# Patient Record
Sex: FEMALE | Race: WHITE | NOT HISPANIC OR LATINO | Employment: OTHER | URBAN - METROPOLITAN AREA
[De-identification: names, ages, dates, MRNs, and addresses within clinical notes are randomized per-mention and may not be internally consistent; named-entity substitution may affect disease eponyms.]

---

## 2017-01-03 ENCOUNTER — TELEPHONE (OUTPATIENT)
Dept: FAMILY MEDICINE | Facility: CLINIC | Age: 82
End: 2017-01-03

## 2017-01-06 ENCOUNTER — DOCUMENTATION ONLY (OUTPATIENT)
Dept: ORTHOPEDICS | Facility: CLINIC | Age: 82
End: 2017-01-06

## 2017-01-06 NOTE — PROGRESS NOTES
Called and Informed patient and spoke with her  of appointment on 01/10/17 at 2:30pm. Patient  states verbal understanding and has no further questions.

## 2017-01-09 ENCOUNTER — LAB VISIT (OUTPATIENT)
Dept: LAB | Facility: HOSPITAL | Age: 82
End: 2017-01-09
Attending: HOSPITALIST
Payer: MEDICARE

## 2017-01-09 ENCOUNTER — PATIENT OUTREACH (OUTPATIENT)
Dept: ADMINISTRATIVE | Facility: CLINIC | Age: 82
End: 2017-01-09
Payer: MEDICARE

## 2017-01-09 ENCOUNTER — TELEPHONE (OUTPATIENT)
Dept: HEPATOLOGY | Facility: HOSPITAL | Age: 82
End: 2017-01-09

## 2017-01-09 DIAGNOSIS — E83.42 HYPOMAGNESEMIA: Primary | ICD-10-CM

## 2017-01-09 LAB
ANION GAP SERPL CALC-SCNC: 11 MMOL/L
BUN SERPL-MCNC: 10 MG/DL
CALCIUM SERPL-MCNC: 9.1 MG/DL
CHLORIDE SERPL-SCNC: 97 MMOL/L
CO2 SERPL-SCNC: 28 MMOL/L
CREAT SERPL-MCNC: 0.7 MG/DL
EST. GFR  (AFRICAN AMERICAN): >60 ML/MIN/1.73 M^2
EST. GFR  (NON AFRICAN AMERICAN): >60 ML/MIN/1.73 M^2
GLUCOSE SERPL-MCNC: 179 MG/DL
MAGNESIUM SERPL-MCNC: 1.4 MG/DL
POTASSIUM SERPL-SCNC: 3.9 MMOL/L
SODIUM SERPL-SCNC: 136 MMOL/L

## 2017-01-09 PROCEDURE — 80048 BASIC METABOLIC PNL TOTAL CA: CPT

## 2017-01-09 PROCEDURE — 83735 ASSAY OF MAGNESIUM: CPT

## 2017-01-09 PROCEDURE — 36415 COLL VENOUS BLD VENIPUNCTURE: CPT

## 2017-01-09 NOTE — PATIENT INSTRUCTIONS
Mechanical Fall  You have had a fall today. It appears that the cause is what we call mechanical. That means that you slipped, tripped or lost your balance. If your fall had been due to fainting or a seizure, other tests might be required.  It is normal to feel sore and tight in your muscles and back the next day, and not just the muscles you injured at first. Remember, all the parts of your body are connected, so while initially one area hurts, the next day another may hurt. Also, when you injure yourself, it causes inflammation, which then causes the muscles to tighten up and hurt more. After the initial worsening, it should gradually improve over the next few days. However, report more severe pain.  Even without a definite head injury, you can still get a concussion from your head suddenly jerking forward, backward or sideways when falling. Concussions and even bleeding can still occur, especially if you have had a recent injury or take blood thinner medicine. It is not unusual to have a mild headache and feel tired and even nauseous or dizzy.   Home care  1. Rest today and resume your normal activities when you are feeling back to normal.  2. If you were injured during the fall, follow the advice from your doctor regarding care of your injury.  3. At first, do not try to stretch out the sore spots. If there is a strain, stretching may make it worse.  Massage may help relax the muscles without stretching them.  4. You can use an ice pack or cold compress on and off to the sore spots 10 to 20 at a time, as often as you feel comfortable. This may help reduce the inflammation, swelling and pain.  5. If you have any scrapes or abrasions, they usually heal within 10 days. It is important to keep the abrasions clean while they initially start to heal. However, an infection may occur even with proper care, so watch for early signs of infection.  Medications  · Talk to your doctor before taking new medicines,  especially if you have other medical problems or are taking other medicines.  · If you need anything for pain, you can take acetaminophen or ibuprofen, unless you were given a different pain medicine to use. Talk with your doctor before using these medicines if you have chronic liver or kidney disease, or ever had a stomach ulcer or gastrointestinal bleeding, or are taking blood thinner medicines.  · Be careful if you are given prescription pain medicines, narcotics, or medicine for muscle spasm. They can make you sleepy, dizzy and can affect your coordination, reflexes and judgment. Do not drive or do work where you can injure yourself when taking them.  Fall prevention  · Was there anything that caused your fall that can be fixed, removed, or replaced?  · Make your home safe by keeping walkways clear of objects you may trip over.  · Use non-slip pads under rugs. Don't use small area rugs or throw rugs.  · Do not walk in poorly lit areas.  · Do not stand on chairs or wobbly ladders.  · Use caution when reaching overhead or looking upward. This position can cause a loss of balance.  · Be sure your shoes fit properly, have non-slip bottoms and are in good condition.  · Be cautious when going up and down curbs, and walking on uneven sidewalks.  · If your balance is poor, consider using a cane or walker.  · Stay as active as you can. Balance, flexibility, strength, and endurance all come from exercise. They all play a role in preventing falls.  · If you have pets, know where they are before you stand up or walk so you don't trip over them.  · Limit alcohol intake. Alcohol can cause balance problems and increase the risk of falls.  · Use night lights.  Follow-up  Follow up with your doctor or as advised by our staff. If X-rays or CT scans were done, you will be notified if there is a change in the reading, especially if it affects treatment.  Call 911  Call 911 if any of these occur:  · Trouble breathing  · Confused or  difficulty arousing  · Fainting or loss of consciousness  · Rapid or very slow heart rate  · Seizure  · Difficulty with speech or vision, weakness of an arm or leg  · Difficulty walking or talking, loss of balance, numbness or weakness in one side of your body, facial droop  When to seek medical advice  Call your healthcare provider right away if any of these occur:  · Repeated mechanical falls, or unexplained falls  · Dizziness  · Severe headache  · Blood in vomit, stools (black or red color)  © 9129-2802 [employer]. [address] [place] [address], [place] [address]. All rights reserved. This information is not intended as a substitute for professional medical care. Always follow your healthcare professional's instructions.

## 2017-01-10 ENCOUNTER — OFFICE VISIT (OUTPATIENT)
Dept: ORTHOPEDICS | Facility: CLINIC | Age: 82
End: 2017-01-10
Payer: MEDICARE

## 2017-01-10 ENCOUNTER — HOSPITAL ENCOUNTER (OUTPATIENT)
Dept: RADIOLOGY | Facility: HOSPITAL | Age: 82
Discharge: HOME | End: 2017-01-10
Attending: ORTHOPAEDIC SURGERY
Payer: MEDICARE

## 2017-01-10 VITALS
RESPIRATION RATE: 16 BRPM | BODY MASS INDEX: 29.23 KG/M2 | DIASTOLIC BLOOD PRESSURE: 62 MMHG | HEART RATE: 97 BPM | WEIGHT: 126.31 LBS | SYSTOLIC BLOOD PRESSURE: 121 MMHG | HEIGHT: 55 IN

## 2017-01-10 DIAGNOSIS — S32.810D MULTIPLE CLOSED FRACTURES OF PELVIS WITH STABLE DISRUPTION OF PELVIC CIRCLE WITH ROUTINE HEALING, SUBSEQUENT ENCOUNTER: Primary | ICD-10-CM

## 2017-01-10 DIAGNOSIS — S32.810D MULTIPLE CLOSED FRACTURES OF PELVIS WITH STABLE DISRUPTION OF PELVIC CIRCLE WITH ROUTINE HEALING, SUBSEQUENT ENCOUNTER: ICD-10-CM

## 2017-01-10 PROCEDURE — 1125F AMNT PAIN NOTED PAIN PRSNT: CPT | Mod: S$GLB,,, | Performed by: PHYSICIAN ASSISTANT

## 2017-01-10 PROCEDURE — 72190 X-RAY EXAM OF PELVIS: CPT | Mod: 26,,, | Performed by: RADIOLOGY

## 2017-01-10 PROCEDURE — 1157F ADVNC CARE PLAN IN RCRD: CPT | Mod: S$GLB,,, | Performed by: PHYSICIAN ASSISTANT

## 2017-01-10 PROCEDURE — 3078F DIAST BP <80 MM HG: CPT | Mod: S$GLB,,, | Performed by: PHYSICIAN ASSISTANT

## 2017-01-10 PROCEDURE — 99499 UNLISTED E&M SERVICE: CPT | Mod: S$GLB,,, | Performed by: PHYSICIAN ASSISTANT

## 2017-01-10 PROCEDURE — 3074F SYST BP LT 130 MM HG: CPT | Mod: S$GLB,,, | Performed by: PHYSICIAN ASSISTANT

## 2017-01-10 PROCEDURE — 72190 X-RAY EXAM OF PELVIS: CPT | Mod: TC

## 2017-01-10 PROCEDURE — 99024 POSTOP FOLLOW-UP VISIT: CPT | Mod: S$GLB,,, | Performed by: PHYSICIAN ASSISTANT

## 2017-01-10 PROCEDURE — 1159F MED LIST DOCD IN RCRD: CPT | Mod: S$GLB,,, | Performed by: PHYSICIAN ASSISTANT

## 2017-01-10 PROCEDURE — 1160F RVW MEDS BY RX/DR IN RCRD: CPT | Mod: S$GLB,,, | Performed by: PHYSICIAN ASSISTANT

## 2017-01-10 PROCEDURE — 99999 PR PBB SHADOW E&M-EST. PATIENT-LVL III: CPT | Mod: PBBFAC,,, | Performed by: PHYSICIAN ASSISTANT

## 2017-01-10 NOTE — LETTER
January 11, 2017      Neptali Zhang MD  [address] [facility] [address]           [facility]  [address]  [facility] [address]  Phone: [phone]          Patient: Marina Trimble   MR Number: [id number]   YOB: 1935   Date of Visit: 1/10/2017       Dear Dr. Neptali Zhang:    Thank you for referring Marina Trimble to me for evaluation. Attached you will find relevant portions of my assessment and plan of care.    If you have questions, please do not hesitate to call me. I look forward to following Marina Trimble along with you.    Sincerely,    MALIA Coelho  CC:  No Recipients    If you would like to receive this communication electronically, please contact [email or web address] or [phone] to request more information on [employer] access.    For providers and/or their staff who would like to refer a patient to [facility], please contact us through our one-stop-shop provider referral line, [facility] , at [phone].    If you feel you have received this communication in error or would no longer like to receive these types of communications, please e-mail [email or web address]

## 2017-01-11 NOTE — PROGRESS NOTES
"Subjective:      Patient ID: Marina Trimble is a 81 y.o. female.    Chief Complaint: Pain and Injury of the Pelvis    HPI    Patient is a 81 year old female who presents to clinic for follow up of left superior anferior pubic rami fracture that occurred on 12/23/2016 after falling from a standing height. Patient has been treated non-operatively. She is weight bearing as tolerated with assistance of a walker She has previous left sided weakness due to previous stroke. Patient uses walker at baseline. She is at home. She is receiving home health. She reports minimal pain. Patient stated that intermittent Naproxen is the only thing that decreases her pain. Denied numbness or tingling.     Review of Systems   Constitution: Negative for chills and fever.   Cardiovascular: Negative for chest pain.   Respiratory: Negative for cough and shortness of breath.    Skin: Negative for color change, dry skin, itching, nail changes, poor wound healing and rash.   Musculoskeletal:        Pelvic fracture.    Neurological: Negative for dizziness.   Psychiatric/Behavioral: Negative for altered mental status. The patient is not nervous/anxious.    All other systems reviewed and are negative.        Objective:        General    Constitutional: She is oriented to person, place, and time. She appears well-developed and well-nourished. No distress.   HENT:   Head: Atraumatic.   Eyes: Conjunctivae are normal.   Cardiovascular: Normal rate.    Pulmonary/Chest: Effort normal.   Neurological: She is alert and oriented to person, place, and time.   Psychiatric: She has a normal mood and affect. Her behavior is normal.         Left Hip Exam     Comments:  Presents to clinic in wheelchair with the .   Mild pain with internal and external rotation of the hip passively.             RADS" pelvis: done in clinic: reviewed by myself:   Healing left inferior pubic ramus fracture in good alignment.  The anterior left acetabular fracture of " 12/23/16 is barely perceptible on the radiograph      Assessment:       Encounter Diagnosis   Name Primary?    Multiple closed fractures of pelvis with stable disruption of pelvic [place] with routine healing, subsequent encounter Yes          Plan:       Discussed treatment plan with patient and family.   She is to be treated non-operatively.   weight bearing as tolerated with assistance.   Continue PT/OT, no pain medication refill needed.   return to clinic in 4 weeks at time x-ray of her pelvis AP inlet outlet and judet is needed.

## 2017-01-11 NOTE — TELEPHONE ENCOUNTER
"Called Ms. Trimble and discussed concerns of medications. Educated patient on need to take MagOX because of her Magnesium was low. She voiced understanding but she wasn't going to take it because he stools would be loose. Assured her that it was temporary while taking medication which was only for three days. She still verbalized she wasn't going to take it.  She explained that her home dose of multivitamin was 2 tabs and that is how she was going to take that medications. I told her resume home dose and follow-up PCP.  Patient stated that she is currently on "Wellpatch" at home and wont be taking the Lidoderm at home. I told her that was okay and any further questions or concerns to follow-up with her primary.  Lastly patient informed me that she was taking 35 units of her Levemir. Explained that is too much and the dose was decreased at discharge to 33 units. She then informed me that her blood sugars were 65 and 72 the last 2 morning. I then instructed her to reduce the levermir to 27units. Also if she has another low blood sugar to inform her PCP and home health nurse. She verbalized understanding  "

## 2017-01-16 ENCOUNTER — TELEPHONE (OUTPATIENT)
Dept: FAMILY MEDICINE | Facility: CLINIC | Age: 82
End: 2017-01-16

## 2017-01-16 ENCOUNTER — TELEPHONE (OUTPATIENT)
Dept: ORTHOPEDICS | Facility: CLINIC | Age: 82
End: 2017-01-16

## 2017-01-16 NOTE — TELEPHONE ENCOUNTER
----- Message from Lisa Chand sent at 1/16/2017 10:39 AM CST -----  Contact: [facility]  Pt's home health nurse states pt is experiencing pain in left pelvic area. Pt doesn't take pain medication. Please call [phone]. Thanks

## 2017-01-16 NOTE — TELEPHONE ENCOUNTER
----- Message from Emely Will sent at 1/16/2017  1:26 PM CST -----  Contact: farza( [facility]) [phone]  Pt is having increased pain to her left pelvis and she declined pain medication.

## 2017-01-16 NOTE — TELEPHONE ENCOUNTER
Elio BERMUDEZ stated, patient has increase pain in her pelvic area. She is taking pain medication. Patient did not fall. Will continue to monitor. No further assistance is needed at this time.

## 2017-01-23 ENCOUNTER — LAB VISIT (OUTPATIENT)
Dept: LAB | Facility: HOSPITAL | Age: 82
End: 2017-01-23
Attending: INTERNAL MEDICINE
Payer: MEDICARE

## 2017-01-23 ENCOUNTER — TELEPHONE (OUTPATIENT)
Dept: FAMILY MEDICINE | Facility: CLINIC | Age: 82
End: 2017-01-23

## 2017-01-23 ENCOUNTER — OFFICE VISIT (OUTPATIENT)
Dept: FAMILY MEDICINE | Facility: CLINIC | Age: 82
End: 2017-01-23
Payer: MEDICARE

## 2017-01-23 VITALS
HEIGHT: 58 IN | DIASTOLIC BLOOD PRESSURE: 78 MMHG | BODY MASS INDEX: 25.82 KG/M2 | WEIGHT: 123 LBS | OXYGEN SATURATION: 97 % | SYSTOLIC BLOOD PRESSURE: 122 MMHG | TEMPERATURE: 99 F | HEART RATE: 80 BPM

## 2017-01-23 DIAGNOSIS — M46.1 SACROILIITIS: ICD-10-CM

## 2017-01-23 DIAGNOSIS — R41.82 ALTERED MENTAL STATUS, UNSPECIFIED ALTERED MENTAL STATUS TYPE: ICD-10-CM

## 2017-01-23 DIAGNOSIS — I70.0 ATHEROSCLEROSIS OF AORTA: ICD-10-CM

## 2017-01-23 DIAGNOSIS — Z86.73 HISTORY OF CVA (CEREBROVASCULAR ACCIDENT): ICD-10-CM

## 2017-01-23 DIAGNOSIS — M47.816 LUMBAR ARTHROPATHY: ICD-10-CM

## 2017-01-23 DIAGNOSIS — C85.96 LYMPHOMA OF LYMPH NODES IN PELVIS, UNSPECIFIED LYMPHOMA TYPE: ICD-10-CM

## 2017-01-23 DIAGNOSIS — I10 ESSENTIAL HYPERTENSION: ICD-10-CM

## 2017-01-23 DIAGNOSIS — E83.42 HYPOMAGNESEMIA: ICD-10-CM

## 2017-01-23 DIAGNOSIS — E79.0 HYPERURICEMIA: ICD-10-CM

## 2017-01-23 DIAGNOSIS — E11.42 DIABETIC POLYNEUROPATHY ASSOCIATED WITH TYPE 2 DIABETES MELLITUS: ICD-10-CM

## 2017-01-23 DIAGNOSIS — M79.642 PAIN IN BOTH HANDS: Primary | ICD-10-CM

## 2017-01-23 DIAGNOSIS — M79.641 PAIN IN BOTH HANDS: Primary | ICD-10-CM

## 2017-01-23 DIAGNOSIS — I69.354 HEMIPARESIS AFFECTING LEFT SIDE AS LATE EFFECT OF STROKE: ICD-10-CM

## 2017-01-23 DIAGNOSIS — N39.0 URINARY TRACT INFECTION WITHOUT HEMATURIA, SITE UNSPECIFIED: ICD-10-CM

## 2017-01-23 DIAGNOSIS — R53.81 DEBILITY: ICD-10-CM

## 2017-01-23 DIAGNOSIS — S32.810D MULTIPLE CLOSED FRACTURES OF PELVIS WITH STABLE DISRUPTION OF PELVIC CIRCLE WITH ROUTINE HEALING, SUBSEQUENT ENCOUNTER: ICD-10-CM

## 2017-01-23 LAB
ALBUMIN SERPL BCP-MCNC: 2.8 G/DL
ALP SERPL-CCNC: 112 U/L
ALT SERPL W/O P-5'-P-CCNC: 14 U/L
ANION GAP SERPL CALC-SCNC: 9 MMOL/L
AST SERPL-CCNC: 20 U/L
BASOPHILS # BLD AUTO: 0.01 K/UL
BASOPHILS NFR BLD: 0.1 %
BILIRUB SERPL-MCNC: 0.5 MG/DL
BUN SERPL-MCNC: 11 MG/DL
CALCIUM SERPL-MCNC: 9.6 MG/DL
CHLORIDE SERPL-SCNC: 99 MMOL/L
CO2 SERPL-SCNC: 29 MMOL/L
CREAT SERPL-MCNC: 0.7 MG/DL
DIFFERENTIAL METHOD: ABNORMAL
EOSINOPHIL # BLD AUTO: 0.1 K/UL
EOSINOPHIL NFR BLD: 1.1 %
ERYTHROCYTE [DISTWIDTH] IN BLOOD BY AUTOMATED COUNT: 15.1 %
EST. GFR  (AFRICAN AMERICAN): >60 ML/MIN/1.73 M^2
EST. GFR  (NON AFRICAN AMERICAN): >60 ML/MIN/1.73 M^2
GLUCOSE SERPL-MCNC: 147 MG/DL
HCT VFR BLD AUTO: 34.6 %
HGB BLD-MCNC: 10.8 G/DL
LYMPHOCYTES # BLD AUTO: 1.5 K/UL
LYMPHOCYTES NFR BLD: 17.6 %
MAGNESIUM SERPL-MCNC: 1.3 MG/DL
MCH RBC QN AUTO: 25.7 PG
MCHC RBC AUTO-ENTMCNC: 31.2 %
MCV RBC AUTO: 82 FL
MONOCYTES # BLD AUTO: 0.5 K/UL
MONOCYTES NFR BLD: 5.7 %
NEUTROPHILS # BLD AUTO: 6.2 K/UL
NEUTROPHILS NFR BLD: 75.1 %
PLATELET # BLD AUTO: 383 K/UL
PMV BLD AUTO: 9.6 FL
POTASSIUM SERPL-SCNC: 4.4 MMOL/L
PROT SERPL-MCNC: 7.3 G/DL
RBC # BLD AUTO: 4.2 M/UL
SODIUM SERPL-SCNC: 137 MMOL/L
URATE SERPL-MCNC: 2.7 MG/DL
WBC # BLD AUTO: 8.29 K/UL

## 2017-01-23 PROCEDURE — 85025 COMPLETE CBC W/AUTO DIFF WBC: CPT

## 2017-01-23 PROCEDURE — 3074F SYST BP LT 130 MM HG: CPT | Mod: S$GLB,,, | Performed by: INTERNAL MEDICINE

## 2017-01-23 PROCEDURE — 1160F RVW MEDS BY RX/DR IN RCRD: CPT | Mod: S$GLB,,, | Performed by: INTERNAL MEDICINE

## 2017-01-23 PROCEDURE — 83735 ASSAY OF MAGNESIUM: CPT

## 2017-01-23 PROCEDURE — 1125F AMNT PAIN NOTED PAIN PRSNT: CPT | Mod: S$GLB,,, | Performed by: INTERNAL MEDICINE

## 2017-01-23 PROCEDURE — 84550 ASSAY OF BLOOD/URIC ACID: CPT

## 2017-01-23 PROCEDURE — 99999 PR PBB SHADOW E&M-EST. PATIENT-LVL III: CPT | Mod: PBBFAC,,, | Performed by: INTERNAL MEDICINE

## 2017-01-23 PROCEDURE — 1157F ADVNC CARE PLAN IN RCRD: CPT | Mod: S$GLB,,, | Performed by: INTERNAL MEDICINE

## 2017-01-23 PROCEDURE — 99499 UNLISTED E&M SERVICE: CPT | Mod: S$GLB,,, | Performed by: INTERNAL MEDICINE

## 2017-01-23 PROCEDURE — 80053 COMPREHEN METABOLIC PANEL: CPT

## 2017-01-23 PROCEDURE — 3078F DIAST BP <80 MM HG: CPT | Mod: S$GLB,,, | Performed by: INTERNAL MEDICINE

## 2017-01-23 PROCEDURE — 99215 OFFICE O/P EST HI 40 MIN: CPT | Mod: S$GLB,,, | Performed by: INTERNAL MEDICINE

## 2017-01-23 PROCEDURE — 36415 COLL VENOUS BLD VENIPUNCTURE: CPT | Mod: PO

## 2017-01-23 PROCEDURE — 1159F MED LIST DOCD IN RCRD: CPT | Mod: S$GLB,,, | Performed by: INTERNAL MEDICINE

## 2017-01-23 RX ORDER — NITROFURANTOIN 25; 75 MG/1; MG/1
100 CAPSULE ORAL 2 TIMES DAILY
Qty: 10 CAPSULE | Refills: 0 | Status: SHIPPED | OUTPATIENT
Start: 2017-01-23 | End: 2017-01-28

## 2017-01-23 NOTE — TELEPHONE ENCOUNTER
----- Message from Amy Santacruz sent at 1/23/2017  9:04 AM CST -----  Contact: Elio [phone]  Home Health Therapist is calling to notify doctor she has been doing great with HH until Friday. He is unsure if she has a case of gout, terrible hand pain, help to get up and down. Before Friday she was almost independent. Thank you!

## 2017-01-23 NOTE — TELEPHONE ENCOUNTER
Home Health Therapist is calling to notify doctor she has been doing great with HH until Friday. He is unsure if she has a case of gout, terrible hand pain, help to get up and down. Before Friday she was almost independent. Appointment 1/23/17.

## 2017-01-23 NOTE — PROGRESS NOTES
Chief complaint: Follow-up from the hospital    81-year-old white female with multiple medical problems .    Here with her .  Recently hospital for a pelvic fracture.  Looks like she had a bad UTI with culture positive.  Looks like she received Cipro on December 28.  She was discharged on January 6 and refused to go get any of the prescriptions sent which appears to have been some Cipro, Naprosyn.  In the hospital she did have some acute increase in her hand pain superimposed on her arthritis.  She does have a history of gout.  It looks like she received a cortisone injection.  The hand pain improved.  This past weekend 2 days ago or has became swollen again and she had some altered mental status, somewhat combative, decreased mobility and decreased purchase patient in her ADLs such as pulling up her pants but she does status because her hands her.  She was getting this way somewhat combative in the hospital when she had the UTI.  She was declining getting the Cipro filled because she felt it gave her diarrhea but it looks like they probably were giving her somatic easier for a low magnesium level upon discharge.  She has had no further discharge or GI symptoms or any other sign of infection.  She has no obvious UTI symptoms but did not have them prior.  Her sugars are running in the 200s during the day but that usual.  She did increase her Levemir to 35 units.  I reviewed prior labs and she did have some high uric acid levels back in 2011.  Hospital records reviewed and  and patient counseled at great length today.Total time over 45 minutes with over 50% counseling.      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 Shot monthly.  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  13) iron deficiency anemia. 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  Vertebral compression fracture treated with vertebral plasty   19.  Prevnar 13 2016 20.  Pelvic fracture December 2016    Allergies: Prednisone, hydrocodone, Lotensin, oxybutynin     Social history: The patient is a nonsmoker who is retired. The patient is . She rarely drank alcohol.     Family history: There is a significant family history of cardiac, pulmonary, and endocrine disease. There is no family history of head and neck malignancy.      Marina was seen today for hospital follow up, fall, hand swelling and transitional care.    Diagnoses and all orders for this visit:    Pain in both hands, suspect an acute crystal pathology such as gout or pseudogout superimposed on her DJD.  Perhaps an ongoing infection such as a recurrent UTIs a precipitating factor.  Also suspect some worsening secondary to lack of feeling the Naprosyn.  We will use the over-the-counter Aleve twice a day for now    Hyperuricemia, reassess discussed treating the acute inflammation and then possibly starting allopurinol if indeed this is related to hyperuricemia  -     Uric acid; Future  -     Magnesium; Future    Altered mental status, unspecified altered mental  status type, may well be secondary to UTI    Urinary tract infection without hematuria, site unspecified, prior culture reviewed, it's impossible for her to give us a urine today secondary to her debilitated status.  Discussed that if indeed mental status and so forth were since she may need to go to the emergency room again    Essential hypertension, chronic and stable  -     CBC auto differential; Future  -     Comprehensive metabolic panel; Future    Hypomagnesemia  -     Magnesium; Future    Debility    Diabetes mellitus with neurological manifestations, uncontrolled, discussed a titration of her Levemir again    Diabetic polyneuropathy associated with type 2 diabetes mellitus    Hemiparesis affecting left side as late effect of stroke, affecting ADLs    Uncontrolled type 2 diabetes mellitus without complication, with long-term current use of insulin    Lumbar arthropathy, affecting ADLs    Multiple closed fractures of pelvis with stable disruption of pelvic [place] with routine healing, subsequent encounter,.  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       Hospital records reviewed as below:      HPI: Patient is a 81 y.o. female who has a past medical history of Anemia; Aneurysm of aorta; Back pain; Cataract; Diabetes mellitus, type 2; Erosive (osteo)arthritis; Gout; Hemiparesis affecting left side as late effect of stroke; History of compression fracture of vertebral column; Hyperlipidemia; Hypertension; Inflammatory bowel disease; Lymphoma of lymph nodes in pelvis; Melanoma; MGUS; Senile osteoporosis;  Urinary incontinence; Vitamin B 12 deficiency; and Vitamin K deficiency presented with left hip pain after sustaining a mechanical fall at beauty shop. She was admitted to [facility] and was found to have multiple pubic rami fractures on imaging. She normally uses a walker to assist with ambulation since her stroke 4 years ago. She was evaluated by orthopedics but they advised non-surgical management. She was seen by PTOT and pain was managed. Patient did not tolerate opioids for pain control due to side effects mainly agitation. Pain control was mainly achieved with NSAIDS and tylenol. Rheumatology was also consulted for arthritis and she was given steroids and voltaren gel. Her hospitalization was also significant for a UTI for which she was treated with ceftriaxone for E. Coli. Patient has been working with PT/OT who recommend SNF for further balance/mobility training. She is currently complaining of fatigue after working with PT. She is tolerating oral intake without nausea. He last bowel movement was yesterday. Pain is about 7/10 and she hadn't had her pain medication since earlier. Patient lives at home with her  and requires DME for ambulating. Patient currently denies any fever/chills, chest pain, dyspnea, weakness, numbness, abdominal pain, nausea/vomiting, dysuria/hematuria, or weight loss.        Hospital Course:  * Multiple fractures of pelvis with stable disruption of pelvic [place] with routine healing  -conservative medical management per orthopedics  -pain control with acetaminophen 650mg q6hrs prn , lidocaine patch 5% q24hrs, and naproxen 500mg bid prn, Patient discharged home on lidocaine patch 5% q24hrs and to finish naproxen 500mg bid 5 day course.  -participated in PT/OT, therapy feels that patient is safe to return home with home health services  -WBAT per ortho  -Fall precautions.  -orthopedics follow up scheduled for 1/12/17  -DVT prophylaxis with enoxaparin 40mg daily      Hypertension  -Low  Na diet  -continue amlodipine 5mg daily and losartan 50mg daily, discharge home on amlodipine 5mg daily and losartan 50mg daily      Hyperlipidemia  -treated with atorvastatin 40mg daily, discharge home on atorvastatin 40mg daily      Anemia  -stable for now at baseline in the 10s  -continue to monitor in the setting of DVT prophylaxis and antiplatelet use  -patient receives B12 injections Q3 weeks; last one given on 1/4/17       UTI (urinary tract infection)  -due to E.coli  -complete course of cipro 500mg daily   -no leukocytosis and afebrile at this time      Diabetes mellitus type 2, uncontrolled  -blood glucoses under acceptable control  -increased novolog 4 units tid with meals to novolog 6 units tid meals, patient to resume home dose of 4/6/6 units with meals  -adjusted detemir to 30 units nightly while at [facility], AM labs with increase to glucose, Patient discharged home on 33 units nightly  -accu checks AC&HS  -hyper/hypoglycemia protocol  -adjust as needed  -on diabetic diet       Hemiparesis affecting left side as late effect of stroke  -treated with atorvastatin 40mg daily, ASA 81mg daily, plavix 75mg daily; Patient discharged home on atorvastatin 40mg daily, ASA 81mg daily, plavix 75mg daily  -continue PT/OT      Erosive (osteo)arthritis  -seen by rheumatology and completed a course of steroids   -xrays of right hand c/o decrease use of hand and pain, no change from previous xays, treated with naproxen 500mg bid  -treated with voltaren gel to hands, calcium carbonate 1000mg daily, and vitamin D 1000units daily; Patient discharged on voltaren gel to hands, calcium carbonate 1000mg daily, and vitamin D 1000units daily  -pain control naproxen 500mg bid x5 days, patient to complete course at home, Orthopedic appointment on 1/12/17      Debility  -PT/OT at [facility] and home health at discharge  -bowel regimen with senna-docusate 8.6-50mg bid and polyethylene 17g bid; patient discharged home on polyethylene  17g prn constipation  -treated with pantoprazole 40mg daily, patient discharge home on pantoprazole 40mg daily  -fall precautions      Hypomagnesemia  -treated with MagOx, Patient to take 3 day course of MagOx 400mg bid at home with home health to repeat labs on Monday and send results to PCP

## 2017-01-24 ENCOUNTER — TELEPHONE (OUTPATIENT)
Dept: FAMILY MEDICINE | Facility: CLINIC | Age: 82
End: 2017-01-24

## 2017-01-24 NOTE — TELEPHONE ENCOUNTER
Call pt AND  -    the recent labs look ok except the magnesium is still a little low but should not really be causing any symptoms. The hospital DID send magnesium pills to take for 5 days so they should be at the pharmacy. prob good to take them and watch for POSSIBLE diarrhea but usually not too bad with the pills    The URIC ACID gout level was actually very normal which is good    All else was ok

## 2017-01-25 ENCOUNTER — TELEPHONE (OUTPATIENT)
Dept: FAMILY MEDICINE | Facility: CLINIC | Age: 82
End: 2017-01-25

## 2017-01-25 NOTE — TELEPHONE ENCOUNTER
----- Message from Michaelle Ruff sent at 1/24/2017 12:57 PM CST -----  Contact: self  Pt is requesting a call from nurse. [phone]          Thanks

## 2017-02-07 ENCOUNTER — OFFICE VISIT (OUTPATIENT)
Dept: ORTHOPEDICS | Facility: CLINIC | Age: 82
End: 2017-02-07
Payer: MEDICARE

## 2017-02-07 ENCOUNTER — HOSPITAL ENCOUNTER (OUTPATIENT)
Dept: RADIOLOGY | Facility: HOSPITAL | Age: 82
Discharge: HOME | End: 2017-02-07
Attending: ORTHOPAEDIC SURGERY
Payer: MEDICARE

## 2017-02-07 VITALS
BODY MASS INDEX: 25.82 KG/M2 | DIASTOLIC BLOOD PRESSURE: 81 MMHG | SYSTOLIC BLOOD PRESSURE: 133 MMHG | RESPIRATION RATE: 16 BRPM | WEIGHT: 123 LBS | HEART RATE: 99 BPM | HEIGHT: 58 IN

## 2017-02-07 DIAGNOSIS — S32.810D MULTIPLE CLOSED FRACTURES OF PELVIS WITH STABLE DISRUPTION OF PELVIC CIRCLE WITH ROUTINE HEALING, SUBSEQUENT ENCOUNTER: ICD-10-CM

## 2017-02-07 DIAGNOSIS — Z98.890 STATUS POST SURGERY: ICD-10-CM

## 2017-02-07 DIAGNOSIS — T14.8XXA FRACTURE: Primary | ICD-10-CM

## 2017-02-07 PROCEDURE — 99999 PR PBB SHADOW E&M-EST. PATIENT-LVL III: CPT | Mod: PBBFAC,,, | Performed by: PHYSICIAN ASSISTANT

## 2017-02-07 PROCEDURE — 1157F ADVNC CARE PLAN IN RCRD: CPT | Mod: S$GLB,,, | Performed by: PHYSICIAN ASSISTANT

## 2017-02-07 PROCEDURE — 72190 X-RAY EXAM OF PELVIS: CPT | Mod: 26,,, | Performed by: RADIOLOGY

## 2017-02-07 PROCEDURE — 3079F DIAST BP 80-89 MM HG: CPT | Mod: S$GLB,,, | Performed by: PHYSICIAN ASSISTANT

## 2017-02-07 PROCEDURE — 1159F MED LIST DOCD IN RCRD: CPT | Mod: S$GLB,,, | Performed by: PHYSICIAN ASSISTANT

## 2017-02-07 PROCEDURE — 1126F AMNT PAIN NOTED NONE PRSNT: CPT | Mod: S$GLB,,, | Performed by: PHYSICIAN ASSISTANT

## 2017-02-07 PROCEDURE — 99213 OFFICE O/P EST LOW 20 MIN: CPT | Mod: 24,S$GLB,, | Performed by: PHYSICIAN ASSISTANT

## 2017-02-07 PROCEDURE — 72190 X-RAY EXAM OF PELVIS: CPT | Mod: TC

## 2017-02-07 PROCEDURE — 1160F RVW MEDS BY RX/DR IN RCRD: CPT | Mod: S$GLB,,, | Performed by: PHYSICIAN ASSISTANT

## 2017-02-07 PROCEDURE — 3075F SYST BP GE 130 - 139MM HG: CPT | Mod: S$GLB,,, | Performed by: PHYSICIAN ASSISTANT

## 2017-02-08 NOTE — PROGRESS NOTES
"Subjective:      Patient ID: Marina Trimble is a 81 y.o. female.    Chief Complaint: Follow-up (pelvis)    HPI    Patient is a 81 year old female who presents to clinic for follow up of left superior anferior pubic rami fracture that occurred on 12/23/2016 after falling from a standing height. Patient has been treated non-operatively. She is weight bearing as tolerated with assistance of a walker. She has previous left sided weakness due to previous stroke. Patient uses walker at baseline. She is at home. She was receiving home health, discharged for meeting all goals. She reports not pain. She stated that she is at baseline for how she was prior to injury. Denied numbness or tingling.     Review of Systems   Constitution: Negative for chills and fever.   Cardiovascular: Negative for chest pain.   Respiratory: Negative for cough and shortness of breath.    Skin: Negative for color change, dry skin, itching, nail changes, poor wound healing and rash.   Musculoskeletal:        Pelvic fracture.    Neurological: Negative for dizziness.   Psychiatric/Behavioral: Negative for altered mental status. The patient is not nervous/anxious.    All other systems reviewed and are negative.        Objective:        General    Constitutional: She is oriented to person, place, and time. She appears well-developed and well-nourished. No distress.   HENT:   Head: Atraumatic.   Eyes: Conjunctivae are normal.   Cardiovascular: Normal rate.    Pulmonary/Chest: Effort normal.   Neurological: She is alert and oriented to person, place, and time.   Psychiatric: She has a normal mood and affect. Her behavior is normal.         Left Hip Exam     Comments:  Presents to clinic in wheelchair with the .   no pain with internal and external rotation of the hip passively.  full range of motion of hip knee and ankle.              RADS" pelvis: done in clinic: reviewed by myself:   Healing left inferior pubic ramus fracture in good " alignment.     Assessment:       Encounter Diagnoses   Name Primary?    Fracture Yes    Multiple closed fractures of pelvis with stable disruption of pelvic [place] with routine healing, subsequent encounter           Plan:       Discussed treatment plan with patient and family.   She is to be treated non-operatively.   weight bearing as tolerated    Continue PT/OT exercises as taught to her,   no pain medication refill needed.   return to clinic in 6 weeks if any increase in pain at time x-ray of her pelvis AP inlet outlet and judet is needed.

## 2017-02-09 ENCOUNTER — OFFICE VISIT (OUTPATIENT)
Dept: PODIATRY | Facility: CLINIC | Age: 82
End: 2017-02-09
Payer: MEDICARE

## 2017-02-09 VITALS
SYSTOLIC BLOOD PRESSURE: 128 MMHG | WEIGHT: 123 LBS | DIASTOLIC BLOOD PRESSURE: 60 MMHG | HEIGHT: 58 IN | BODY MASS INDEX: 25.82 KG/M2

## 2017-02-09 DIAGNOSIS — L90.9 FAT PAD ATROPHY OF FOOT: ICD-10-CM

## 2017-02-09 DIAGNOSIS — D51.0 PERNICIOUS ANEMIA: ICD-10-CM

## 2017-02-09 DIAGNOSIS — B35.1 NAIL DERMATOPHYTOSIS: ICD-10-CM

## 2017-02-09 DIAGNOSIS — E11.49 TYPE II DIABETES MELLITUS WITH NEUROLOGICAL MANIFESTATIONS: Primary | ICD-10-CM

## 2017-02-09 PROCEDURE — 99999 PR PBB SHADOW E&M-EST. PATIENT-LVL III: CPT | Mod: PBBFAC,,, | Performed by: PODIATRIST

## 2017-02-09 PROCEDURE — 11721 DEBRIDE NAIL 6 OR MORE: CPT | Mod: Q9,S$GLB,, | Performed by: PODIATRIST

## 2017-02-09 PROCEDURE — 99499 UNLISTED E&M SERVICE: CPT | Mod: S$GLB,,, | Performed by: PODIATRIST

## 2017-02-09 RX ORDER — DICLOFENAC SODIUM 10 MG/G
GEL TOPICAL
COMMUNITY
Start: 2017-01-24 | End: 2019-01-02

## 2017-02-09 NOTE — PATIENT INSTRUCTIONS
Recommend lotions: eucerin, aquaphor, A&D ointment, gold bond for diabetics, sween    Shoe recommendations: (try 6pm.com, [email or web address].com , [email or web address], or shoes.com for discounted prices) you can visit DSW shoes in [place] as well    Asics (GT 1000 or gel foundations), new balance, saucony (stabil c3),  Barnes (transcend), vionic, propet (tennis shoe)    soft brand, clarks, crocs, aerosoles, naturalizers, SAS, ecco, ginette, sara geiger, rockports (dress shoes)    Vionic, volitiles, burkenstocks, fitflops, propet (sandals)    Nike comfort thong sandals, crocs (house shoes)    Nail Home remedy:  Vicks Vapor rub OR Listerine and apple cider vinegar in a spray bottle to nails    Occasional soaks for 15-20 mins in luke warm water with 1 cup of listerine and 1 cup of apple cider vinegar are ok You may add several drops of oil of oregano or tea tree oil as well      Diabetes: Inspecting Your Feet  Diabetes increases your chances of developing foot problems. So inspect your feet every day. This helps you find small skin irritations before they become serious infections. If you have trouble seeing the bottoms of your feet, use a mirror or ask a family member or friend to help.     Pressure spots on the bottom of the foot are common areas where problems develop.   How to check your feet  Below are tips to help you look for foot problems. Try to check your feet at the same time each day, such as when you get out of bed in the morning:  · Check the top of each foot. The tops of toes, back of the heel, and outer edge of the foot can get a lot of rubbing from poor-fitting shoes.  · Check the bottom of each foot. Daily wear and tear often leads to problems at pressure spots.  · Check the toes and nails. Fungal infections often occur between toes. Toenail problems can also be a sign of fungal infections or lead to breaks in the skin.  · Check your shoes, too. Loose objects inside a shoe can injure the foot. Use your hand to feel  inside your shoes for things like mariano, loose stitching, or rough areas that could irritate your skin.  Warning signs  Look for any color changes in the foot. Redness with streaks can signal a severe infection, which needs immediate medical attention. Tell your doctor right away if you have any of these problems:  · Swelling, sometimes with color changes, may be a sign of poor blood flow or infection. Symptoms include tenderness and an increase in the size of your foot.  · Warm or hot areas on your feet may be signs of infection. A foot that is cold may not be getting enough blood.  · Sensations such as burning, tingling, or pins and needles can be signs of a problem. Also check for areas that may be numb.  · Hot spots are caused by friction or pressure. Look for hot spots in areas that get a lot of rubbing. Hot spots can turn into blisters, calluses, or sores.  · Cracks and sores are caused by dry or irritated skin. They are a sign that the skin is breaking down, which can lead to infection.  · Toenail problems to watch for include nails growing into the skin (ingrown toenail) and causing redness or pain. Thick, yellow, or discolored nails can signal a fungal infection.  · Drainage and odor can develop from untreated sores and ulcers. Call your doctor right away if you notice white or yellow drainage, bleeding, or unpleasant odor.   © 0412-6335 [employer]. [address] [place] [address], [place] [address]. All rights reserved. This information is not intended as a substitute for professional medical care. Always follow your healthcare professional's instructions.

## 2017-02-09 NOTE — PROGRESS NOTES
Subjective:      Patient ID: Marina Trimble is a 81 y.o. female.    Chief Complaint: Diabetic Foot Exam (Pcp  Dr. Jimenez  01/23/2017); Diabetes Mellitus; and Nail Care    Marina is a 81 y.o. female who presents to the clinic for evaluation and treatment of high risk feet. 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 The patient's chief complaint is long, thick toenails. This patient has documented high risk feet requiring routine maintenance secondary to diabetes mellitis and those secondary complications of diabetes, as mentioned. She has the one type of anemia; pernious 281.0 that still qualifies for nail care.    PCP: Jose Jimenez MD    Date Last Seen by PCP:   Chief Complaint   Patient presents with    Diabetic Foot Exam     Pcp  Dr. Jimenez  01/23/2017    Diabetes Mellitus    Nail Care       Current shoe gear:  SAS shoes    Hemoglobin A1C   Date Value Ref Range Status   12/23/2016 9.2 (H) 4.5 - 6.2 % Final     Comment:     According to ADA guidelines, hemoglobin A1C <7.0% represents  optimal control in non-pregnant diabetic patients.  Different  metrics may apply to specific populations.   Standards of Medical Care in Diabetes - 2016.  For the purpose  "of screening for the presence of diabetes:  <5.7%     Consistent with the absence of diabetes  5.7-6.4%  Consistent with increasing risk for diabetes   (prediabetes)  >or=6.5%  Consistent with diabetes  Currently no consensus exists for use of hemoglobin A1C  for diagnosis of diabetes for children.     08/25/2016 9.2 (H) 4.5 - 6.2 % Final     Comment:     According to ADA guidelines, hemoglobin A1C <7.0% represents  optimal control in non-pregnant diabetic patients.  Different  metrics may apply to specific populations.   Standards of Medical Care in Diabetes - 2016.  For the purpose of screening for the presence of diabetes:  <5.7%     Consistent with the absence of diabetes  5.7-6.4%  Consistent with increasing risk for diabetes   (prediabetes)  >or=6.5%  Consistent with diabetes  Currently no consensus exists for use of hemoglobin A1C  for diagnosis of diabetes for children.     03/07/2016 8.4 (H) 4.5 - 6.2 % Final     Current Outpatient Prescriptions on File Prior to Visit   Medication Sig Dispense Refill    acetaminophen (TYLENOL) 500 MG tablet Take 1 tablet (500 mg total) by mouth every 4 (four) hours as needed for Pain.  0    amitriptyline (ELAVIL) 10 MG tablet Take 10 mg by mouth once daily.       amlodipine (NORVASC) 5 MG tablet Take 1 tablet (5 mg total) by mouth once daily. 90 tablet 4    aspirin (ECOTRIN) 81 MG EC tablet Take 81 mg by mouth once daily.      atorvastatin (LIPITOR) 40 MG tablet TAKE 1 TABLET ONE TIME DAILY 90 tablet 0    BD ULTRA-FINE BEVERLY PEN NEEDLES 32 gauge x 5/32" Ndle USE TWICE DAILY 500 each 12    blood sugar diagnostic (BLOOD GLUCOSE TEST) Strp Use to check blood glucose 3 times daily. Device is Accucheck. 100 each 11    blood-glucose meter Misc Use ad directed (Accu Check Beverly smartview meter) 1 each 4    calcium carbonate (TUMS) 200 mg calcium (500 mg) chewable tablet Take 2 tablets (1,000 mg total) by mouth once daily. 60 tablet 11    clopidogrel (PLAVIX) 75 mg tablet " "TAKE 1 TABLET ONE TIME DAILY 90 tablet 3    cyanocobalamin 1,000 mcg/mL injection INJECT 1 ML UNDER THE SKIN EVERY 2 WEEKS (Patient taking differently: INJECT 1 ML UNDER THE SKIN EVERY 3 WEEKS) 6 mL 10    cycloSPORINE (RESTASIS) 0.05 % ophthalmic emulsion Place 0.4 mLs (1 drop total) into both eyes 2 (two) times daily. 180 vial 12    insulin aspart (NOVOLOG FLEXPEN) 100 unit/mL InPn pen Inject 4/6/6 units into skin with correction scale 150-200 +1, 201-250 +2, 251-300 +3, 301-350 +4, >350 +5. 6 mL 4    insulin detemir (LEVEMIR FLEXTOUCH) 100 unit/mL (3 mL) SubQ InPn pen Inject 33 Units into the skin every evening. 9 mL 4    lancets (ACCU-CHEK SOFTCLIX LANCETS) Misc Use to check blood glucose 3 times daily 100 each 11    lidocaine (LIDODERM) 5 % Place 1 patch onto the skin once daily. Remove & Discard patch within 12 hours or as directed by MD 30 patch 0    losartan (COZAAR) 50 MG tablet TAKE 1 TABLET ONE TIME DAILY 90 tablet 12    magnesium oxide (MAG-OX) 400 mg tablet Take 1 tablet (400 mg total) by mouth 2 (two) times daily. 5 tablet 0    multivitamin (CHEWABLE MULTI VITAMIN) Chew Take 2 tablets by mouth once daily.       naproxen (NAPROSYN) 500 MG tablet Take 1 tablet (500 mg total) by mouth 2 (two) times daily with meals. 11 tablet 0    NOVOFINE 32 32 gauge x 1/4" Ndle USE WITH LEVEMIR PEN EVERY EVENING 100 each 3    pantoprazole (PROTONIX) 40 MG tablet Take 1 tablet (40 mg total) by mouth once daily. 30 tablet 3    polyethylene glycol (GLYCOLAX) 17 gram PwPk Take 17 g by mouth daily as needed (constipation). 30 each 0    PROCTOZONE-HC 2.5 % rectal cream Place rectally 2 (two) times daily. 90 g 12    TRUEPLUS LANCETS 28 gauge Misc USE  TO CHECK BLOOD SUGAR THREE TIMES DAILY 300 each 0    UNABLE TO FIND Places uses Wellpatch daily      vitamin D 1000 units Tab Take 1 tablet (1,000 Units total) by mouth once daily.      triamcinolone acetonide 0.1% (KENALOG) 0.1 % cream Apply topically 2 (two) " times daily. 45 g 3     No current facility-administered medications on file prior to visit.      Review of patient's allergies indicates:   Allergen Reactions    Baclofen Nausea And Vomiting and Other (See Comments)     Dizziness    Benazepril      Other reaction(s): Rash,Swelling    Carisoprodol      Other reaction(s): Dysphoria    Desloratadine      Other reaction(s): Nausea  Other reaction(s): Nausea    Hydrocodone      Other reaction(s): disorientation    Methocarbamol      Other reaction(s): Nausea    Omeprazole Nausea And Vomiting    Oxybutynin      Other reaction(s): Hallucinations    Oxycodone Nausea And Vomiting    Tramadol      Other reaction(s): dizziness    Benazepril-hydrochlorothiazide Rash     Other reaction(s): Swelling    Metformin Diarrhea    Prednisone Rash     Past Surgical History   Procedure Laterality Date    Partial glossectomy  2010    Selective neck dissection  2010    Hysterectomy      Tonsilectomy      Colectomy      Tonsillectomy      Appendectomy      Cholecystectomy      Eyelid surgery      Cataract extraction w/  intraocular lens implant Left 10/16/14     OS ()     Family History   Problem Relation Age of Onset    Coronary artery disease      COPD      Skin cancer Neg Hx     Melanoma Neg Hx      Social History     Social History    Marital status:      Spouse name: N/A    Number of children: N/A    Years of education: N/A     Occupational History    Not on file.     Social History Main Topics    Smoking status: Never Smoker    Smokeless tobacco: Never Used    Alcohol use No    Drug use: No    Sexual activity: No     Other Topics Concern    Are You Pregnant Or Think You May Be? No    Breast-Feeding No     Social History Narrative    She is . She lives on the [facility].     Review of Systems   Constitution: Negative for chills, fever and weakness.   Cardiovascular: Positive for leg swelling. Negative for claudication.  "  Respiratory: Positive for shortness of breath. Negative for cough.    Skin: Positive for dry skin and nail changes. Negative for itching and rash.   Musculoskeletal: Positive for arthritis, back pain, falls (december 2016, broke her hip), gout and joint pain. Negative for joint swelling and muscle weakness.   Gastrointestinal: Negative for diarrhea, nausea and vomiting.   Neurological: Positive for paresthesias. Negative for numbness and tremors.   Psychiatric/Behavioral: Negative for altered mental status and hallucinations.         Objective:       Vitals:    02/09/17 0926   BP: 128/60   Weight: 55.8 kg (123 lb)   Height: 4' 10" (1.473 m)   PainSc: 0-No pain       Physical Exam   Constitutional:   General: Pt. is well-developed, well-nourished, appears stated age, in no acute distress, alert and oriented x 3. No evidence of depression, anxiety, or agitation. Calm, cooperative, and communicative. Appropriate interactions and affect.       Cardiovascular:   Pulses:       Dorsalis pedis pulses are 1+ on the right side, and 1+ on the left side.        Posterior tibial pulses are 1+ on the right side, and 1+ on the left side.   Dorsalis pedis and posterior tibial pulses are diminished Bilaterally. Toes are cool to touch. Feet are warm proximally.There is decreased digital hair. Skin is atrophic   Musculoskeletal:        Right ankle: She exhibits swelling. She exhibits normal range of motion. No tenderness. Achilles tendon exhibits no pain and no defect.        Left ankle: She exhibits swelling. She exhibits normal range of motion. No tenderness. Achilles tendon exhibits no pain and no defect.        Right foot: There is normal range of motion and no tenderness.        Left foot: There is normal range of motion and no tenderness.   Decreased stride, station of gait.  apropulsive toe off.  Increased angle and base of gait.    Patient has hammertoes of digits 2-5 bilateral partially reducible without symptom " today.    Visible and palpable bunion without pain at dorsomedial 1st metatarsal head right and left.  Hallux abducted right and left partially reducible, tracks laterally without being track bound.  No ecchymosis, erythema, edema, or cardinal signs infection or signs of trauma same foot.    Fat pad atrophy to heels and met heads bilateral       Neurological: No sensory deficit.   Paden-Rodolfo 5.07 monofilament is intact bilateral feet. Sharp/dull sensation is also intact Bilateral feet.       Skin: Skin is warm, dry and intact. No abrasion, no ecchymosis, no lesion and no rash noted. She is not diaphoretic. No cyanosis or erythema. No pallor. Nails show no clubbing.   Skin is atrophic, mild plantar rubor.  No pallor.      No pedal hair noted    Toenails 1-5 bilaterally are elongated by 2-3 mm, thickened by 2-3 mm, discolored/yellowed, dystrophic, brittle with subungual debris.     granuloma noted to lateral right hallux nail border;  Tender to palpation     Interdigital Spaces clean, dry and without evidence of break in skin integrity   Psychiatric: She has a normal mood and affect. Her speech is normal.   Nursing note and vitals reviewed.        Assessment:       Encounter Diagnoses   Name Primary?    Type II diabetes mellitus with neurological manifestations Yes    Pernicious anemia     Fat pad atrophy of foot     Nail dermatophytosis          Plan:       Marina was seen today for diabetic foot exam, diabetes mellitus and nail care.    Diagnoses and all orders for this visit:    Type II diabetes mellitus with neurological manifestations    Pernicious anemia    Fat pad atrophy of foot    Nail dermatophytosis    I counseled the patient on her conditions, their implications and medical management.    - Shoe inspection. Diabetic Foot Education. Patient reminded of the importance of good nutrition and blood sugar control to help prevent podiatric complications of diabetes. Patient instructed on proper foot  hygeine. We discussed wearing proper shoe gear, daily foot inspections, never walking without protective shoe gear, never putting sharp instruments to feet.     - With patient's permission, nails were aggressively reduced and debrided x 10 to their soft tissue attachment mechanically and with electric , removing all offending nail and debris. Patient relates relief following the procedure.  She will continue to monitor the areas daily, inspect her feet, wear protective shoe gear when ambulatory, moisturizer to maintain skin integrity and follow in this office in approximately 3-4 months, sooner p.r.n.

## 2017-02-09 NOTE — MR AVS SNAPSHOT
[facility]  [address] [place] [address]  Phone: [phone]                  Marina Trimble   2017 9:30 AM   Office Visit    Description:  Female : 1935   Provider:  Debbie Woodruff DPM   Department:  [facility]           Reason for Visit     Diabetic Foot Exam     Diabetes Mellitus     Nail Care           Diagnoses this Visit        Comments    Type II diabetes mellitus with neurological manifestations    -  Primary     Pernicious anemia         Fat pad atrophy of foot         Nail dermatophytosis                To Do List           Future Appointments        Provider Department Dept Phone    3/1/2017 9:30 AM LAB, [facility] [phone]    3/2/2017 1:00 PM MD [facility] A [facility] - Hematology Oncology [phone]      Goals (5 Years of Data)     None      [facility] On Call     [facility] On [facility] Nurse Care Line -  Assistance  Registered nurses in the [facility] provide clinical advisement, health education, appointment booking, and other advisory services.  Call for this free service at [phone].             Medications           Message regarding Medications     Verify the changes and/or additions to your medication regime listed below are the same as discussed with your clinician today.  If any of these changes or additions are incorrect, please notify your healthcare provider.             Verify that the below list of medications is an accurate representation of the medications you are currently taking.  If none reported, the list may be blank. If incorrect, please contact your healthcare provider. Carry this list with you in case of emergency.           Current Medications     acetaminophen (TYLENOL) 500 MG tablet Take 1 tablet (500 mg total) by mouth every 4 (four) hours as needed for Pain.    amitriptyline (ELAVIL) 10 MG tablet Take 10 mg by mouth once daily.     amlodipine (NORVASC) 5 MG tablet Take 1  "tablet (5 mg total) by mouth once daily.    aspirin (ECOTRIN) 81 MG EC tablet Take 81 mg by mouth once daily.    atorvastatin (LIPITOR) 40 MG tablet TAKE 1 TABLET ONE TIME DAILY    BD ULTRA-FINE BEVERLY PEN NEEDLES 32 gauge x 5/32" Ndle USE TWICE DAILY    blood sugar diagnostic (BLOOD GLUCOSE TEST) Strp Use to check blood glucose 3 times daily. Device is Accucheck.    blood-glucose meter Misc Use ad directed (Accu Check Beverly smartview meter)    calcium carbonate (TUMS) 200 mg calcium (500 mg) chewable tablet Take 2 tablets (1,000 mg total) by mouth once daily.    clopidogrel (PLAVIX) 75 mg tablet TAKE 1 TABLET ONE TIME DAILY    cyanocobalamin 1,000 mcg/mL injection INJECT 1 ML UNDER THE SKIN EVERY 2 WEEKS    cycloSPORINE (RESTASIS) 0.05 % ophthalmic emulsion Place 0.4 mLs (1 drop total) into both eyes 2 (two) times daily.    insulin aspart (NOVOLOG FLEXPEN) 100 unit/mL InPn pen Inject 4/6/6 units into skin with correction scale 150-200 +1, 201-250 +2, 251-300 +3, 301-350 +4, >350 +5.    insulin detemir (LEVEMIR FLEXTOUCH) 100 unit/mL (3 mL) SubQ InPn pen Inject 33 Units into the skin every evening.    lancets (ACCU-CHEK SOFTCLIX LANCETS) Misc Use to check blood glucose 3 times daily    lidocaine (LIDODERM) 5 % Place 1 patch onto the skin once daily. Remove & Discard patch within 12 hours or as directed by MD    losartan (COZAAR) 50 MG tablet TAKE 1 TABLET ONE TIME DAILY    magnesium oxide (MAG-OX) 400 mg tablet Take 1 tablet (400 mg total) by mouth 2 (two) times daily.    multivitamin (CHEWABLE MULTI VITAMIN) Chew Take 2 tablets by mouth once daily.     naproxen (NAPROSYN) 500 MG tablet Take 1 tablet (500 mg total) by mouth 2 (two) times daily with meals.    NOVOFINE 32 32 gauge x 1/4" Ndle USE WITH LEVEMIR PEN EVERY EVENING    pantoprazole (PROTONIX) 40 MG tablet Take 1 tablet (40 mg total) by mouth once daily.    polyethylene glycol (GLYCOLAX) 17 gram PwPk Take 17 g by mouth daily as needed (constipation).    " "PROCTOZONE-HC 2.5 % rectal cream Place rectally 2 (two) times daily.    TRUEPLUS LANCETS 28 gauge Misc USE  TO CHECK BLOOD SUGAR THREE TIMES DAILY    UNABLE TO FIND Places uses Wellpatch daily    vitamin D 1000 units Tab Take 1 tablet (1,000 Units total) by mouth once daily.    VOLTAREN 1 % Gel     triamcinolone acetonide 0.1% (KENALOG) 0.1 % cream Apply topically 2 (two) times daily.           Clinical Reference Information           Your Vitals Were     BP Height Weight BMI       128/60 4' 10" (1.473 m) 55.8 kg (123 lb) 25.71 kg/m2       Blood Pressure          Most Recent Value    BP  128/60      Allergies as of 2/9/2017     Baclofen    Benazepril    Carisoprodol    Desloratadine    Hydrocodone    Methocarbamol    Omeprazole    Oxybutynin    Oxycodone    Tramadol    Benazepril-hydrochlorothiazide    Metformin    Prednisone      Immunizations Administered on Date of Encounter - 2/9/2017     None      Instructions    Recommend lotions: eucerin, aquaphor, A&D ointment, gold bond for diabetics, sween    Shoe recommendations: (try [email or web address], [email or web address] , [email or web address], or [email or web address] Labs for discounted prices) you can visit DSW shoes in [place] as well    Asics (GT 1000 or gel foundations), new balance, saucony (stabil c3),  Barnes (transcend), vionic, propet (tennis shoe)    soft brand, clarks, crocs, aerosoles, naturalizers, SAS, ecco, ginette, sara geiger, rockports (dress shoes)    Vionic, volitiles, burkenstocks, fitflops, propet (sandals)    Nike comfort thong sandals, crocs (house shoes)    Nail Home remedy:  Vicks Vapor rub OR Listerine and apple cider vinegar in a spray bottle to nails    Occasional soaks for 15-20 mins in luke warm water with 1 cup of listerine and 1 cup of apple cider vinegar are ok You may add several drops of oil of oregano or tea tree oil as well      Diabetes: Inspecting Your Feet  Diabetes increases your chances of developing foot problems. So inspect your feet every day. This helps you find " small skin irritations before they become serious infections. If you have trouble seeing the bottoms of your feet, use a mirror or ask a family member or friend to help.     Pressure spots on the bottom of the foot are common areas where problems develop.   How to check your feet  Below are tips to help you look for foot problems. Try to check your feet at the same time each day, such as when you get out of bed in the morning:  · Check the top of each foot. The tops of toes, back of the heel, and outer edge of the foot can get a lot of rubbing from poor-fitting shoes.  · Check the bottom of each foot. Daily wear and tear often leads to problems at pressure spots.  · Check the toes and nails. Fungal infections often occur between toes. Toenail problems can also be a sign of fungal infections or lead to breaks in the skin.  · Check your shoes, too. Loose objects inside a shoe can injure the foot. Use your hand to feel inside your shoes for things like mariano, loose stitching, or rough areas that could irritate your skin.  Warning signs  Look for any color changes in the foot. Redness with streaks can signal a severe infection, which needs immediate medical attention. Tell your doctor right away if you have any of these problems:  · Swelling, sometimes with color changes, may be a sign of poor blood flow or infection. Symptoms include tenderness and an increase in the size of your foot.  · Warm or hot areas on your feet may be signs of infection. A foot that is cold may not be getting enough blood.  · Sensations such as burning, tingling, or pins and needles can be signs of a problem. Also check for areas that may be numb.  · Hot spots are caused by friction or pressure. Look for hot spots in areas that get a lot of rubbing. Hot spots can turn into blisters, calluses, or sores.  · Cracks and sores are caused by dry or irritated skin. They are a sign that the skin is breaking down, which can lead to  infection.  · Toenail problems to watch for include nails growing into the skin (ingrown toenail) and causing redness or pain. Thick, yellow, or discolored nails can signal a fungal infection.  · Drainage and odor can develop from untreated sores and ulcers. Call your doctor right away if you notice white or yellow drainage, bleeding, or unpleasant odor.   © 9942-5636 [employer]. [address] [place] [address], [place] [address]. All rights reserved. This information is not intended as a substitute for professional medical care. Always follow your healthcare professional's instructions.               Language Assistance Services     ATTENTION: Language assistance services are available, free of charge. Please call [phone].      ATENCIÓN: Si camillala alvino, tiene a vasquez disposición servicios gratuitos de asistencia lingüística. Llame al [phone].     CHÚ Ý: N?u b?n nói Ti?ng Vi?t, có các d?ch v? h? tr? ngôn ng? mi?n phí dành cho b?n. G?i s? [phone].         [facility] complies with applicable Federal civil rights laws and does not discriminate on the basis of race, color, national origin, age, disability, or sex.

## 2017-03-01 ENCOUNTER — LAB VISIT (OUTPATIENT)
Dept: LAB | Facility: HOSPITAL | Age: 82
End: 2017-03-01
Attending: INTERNAL MEDICINE
Payer: MEDICARE

## 2017-03-01 DIAGNOSIS — E56.1 VITAMIN K DEFICIENCY: ICD-10-CM

## 2017-03-01 DIAGNOSIS — D47.2 MGUS (MONOCLONAL GAMMOPATHY OF UNKNOWN SIGNIFICANCE): ICD-10-CM

## 2017-03-01 DIAGNOSIS — E53.8 VITAMIN B12 DEFICIENCY: ICD-10-CM

## 2017-03-01 DIAGNOSIS — D64.9 ANEMIA: ICD-10-CM

## 2017-03-01 LAB
ALBUMIN SERPL BCP-MCNC: 3.3 G/DL
ALP SERPL-CCNC: 89 U/L
ALT SERPL W/O P-5'-P-CCNC: 10 U/L
ANION GAP SERPL CALC-SCNC: 10 MMOL/L
APTT BLDCRRT: 22.7 SEC
AST SERPL-CCNC: 19 U/L
BILIRUB SERPL-MCNC: 0.3 MG/DL
BUN SERPL-MCNC: 10 MG/DL
CALCIUM SERPL-MCNC: 9.4 MG/DL
CHLORIDE SERPL-SCNC: 104 MMOL/L
CO2 SERPL-SCNC: 29 MMOL/L
CREAT SERPL-MCNC: 0.7 MG/DL
ERYTHROCYTE [DISTWIDTH] IN BLOOD BY AUTOMATED COUNT: 16.6 %
EST. GFR  (AFRICAN AMERICAN): >60 ML/MIN/1.73 M^2
EST. GFR  (NON AFRICAN AMERICAN): >60 ML/MIN/1.73 M^2
GLUCOSE SERPL-MCNC: 90 MG/DL
HCT VFR BLD AUTO: 36.6 %
HGB BLD-MCNC: 11 G/DL
MCH RBC QN AUTO: 24.8 PG
MCHC RBC AUTO-ENTMCNC: 30.1 %
MCV RBC AUTO: 82 FL
NEUTROPHILS # BLD AUTO: 4.1 K/UL
PLATELET # BLD AUTO: 363 K/UL
PMV BLD AUTO: 9.9 FL
POTASSIUM SERPL-SCNC: 3.8 MMOL/L
PROT SERPL-MCNC: 7.3 G/DL
RBC # BLD AUTO: 4.44 M/UL
SODIUM SERPL-SCNC: 143 MMOL/L
VIT B12 SERPL-MCNC: 1795 PG/ML
WBC # BLD AUTO: 6.66 K/UL

## 2017-03-01 PROCEDURE — 85027 COMPLETE CBC AUTOMATED: CPT | Mod: PO

## 2017-03-01 PROCEDURE — 84165 PROTEIN E-PHORESIS SERUM: CPT

## 2017-03-01 PROCEDURE — 84165 PROTEIN E-PHORESIS SERUM: CPT | Mod: 26,,, | Performed by: PATHOLOGY

## 2017-03-01 PROCEDURE — 85730 THROMBOPLASTIN TIME PARTIAL: CPT

## 2017-03-01 PROCEDURE — 36415 COLL VENOUS BLD VENIPUNCTURE: CPT | Mod: PO

## 2017-03-01 PROCEDURE — 82607 VITAMIN B-12: CPT

## 2017-03-01 PROCEDURE — 80053 COMPREHEN METABOLIC PANEL: CPT

## 2017-03-02 ENCOUNTER — OFFICE VISIT (OUTPATIENT)
Dept: HEMATOLOGY/ONCOLOGY | Facility: CLINIC | Age: 82
End: 2017-03-02
Payer: MEDICARE

## 2017-03-02 VITALS
DIASTOLIC BLOOD PRESSURE: 78 MMHG | HEIGHT: 60 IN | SYSTOLIC BLOOD PRESSURE: 118 MMHG | HEART RATE: 84 BPM | BODY MASS INDEX: 25.67 KG/M2 | WEIGHT: 130.75 LBS

## 2017-03-02 DIAGNOSIS — E53.8 VITAMIN B12 DEFICIENCY: ICD-10-CM

## 2017-03-02 DIAGNOSIS — D47.2 MGUS (MONOCLONAL GAMMOPATHY OF UNKNOWN SIGNIFICANCE): ICD-10-CM

## 2017-03-02 DIAGNOSIS — D64.9 ANEMIA, UNSPECIFIED TYPE: ICD-10-CM

## 2017-03-02 DIAGNOSIS — C82.96 FOLLICULAR LYMPHOMA OF INTRAPELVIC LYMPH NODES, UNSPECIFIED FOLLICULAR LYMPHOMA TYPE: Primary | ICD-10-CM

## 2017-03-02 LAB
ALBUMIN SERPL ELPH-MCNC: 3.35 G/DL
ALPHA1 GLOB SERPL ELPH-MCNC: 0.44 G/DL
ALPHA2 GLOB SERPL ELPH-MCNC: 1.23 G/DL
B-GLOBULIN SERPL ELPH-MCNC: 0.84 G/DL
GAMMA GLOB SERPL ELPH-MCNC: 0.95 G/DL
PATHOLOGIST INTERPRETATION SPE: NORMAL
PROT SERPL-MCNC: 6.8 G/DL

## 2017-03-02 PROCEDURE — 1159F MED LIST DOCD IN RCRD: CPT | Mod: S$GLB,,, | Performed by: INTERNAL MEDICINE

## 2017-03-02 PROCEDURE — 99999 PR PBB SHADOW E&M-EST. PATIENT-LVL III: CPT | Mod: PBBFAC,,, | Performed by: INTERNAL MEDICINE

## 2017-03-02 PROCEDURE — 1126F AMNT PAIN NOTED NONE PRSNT: CPT | Mod: S$GLB,,, | Performed by: INTERNAL MEDICINE

## 2017-03-02 PROCEDURE — 99214 OFFICE O/P EST MOD 30 MIN: CPT | Mod: S$GLB,,, | Performed by: INTERNAL MEDICINE

## 2017-03-02 PROCEDURE — 1157F ADVNC CARE PLAN IN RCRD: CPT | Mod: S$GLB,,, | Performed by: INTERNAL MEDICINE

## 2017-03-02 PROCEDURE — 3074F SYST BP LT 130 MM HG: CPT | Mod: S$GLB,,, | Performed by: INTERNAL MEDICINE

## 2017-03-02 PROCEDURE — 3078F DIAST BP <80 MM HG: CPT | Mod: S$GLB,,, | Performed by: INTERNAL MEDICINE

## 2017-03-02 PROCEDURE — 1160F RVW MEDS BY RX/DR IN RCRD: CPT | Mod: S$GLB,,, | Performed by: INTERNAL MEDICINE

## 2017-03-02 PROCEDURE — 99499 UNLISTED E&M SERVICE: CPT | Mod: S$GLB,,, | Performed by: INTERNAL MEDICINE

## 2017-03-02 NOTE — PROGRESS NOTES
"Mrs. Andrade is an 81-year-old woman who was treated for lymphoma in 1997.    Biopsy of a retroperitoneal lymph node was interpreted as "malignant lymphoma,   follicular center cell type, small cleaved cell predominant, diffuse sclerotic   growth pattern".  She received six courses of CHOP chemotherapy, followed by   radiation to the periaortic lymph nodes in the region of L1-L5,   completed in March 1998.    Since that time, she has had no symptoms or signs of lymphoma recurrence.  She   is not having fevers or sweats.  She has not noted any enlarged lymph nodes.    In 2004, she had iron deficiency anemia.  A video capsule study showed a small   intestinal lesion that was probably an AVM.  She required several infusions of   intravenous iron, the last of which was administered in November 2009.  There   has been no recurrence of significant anemia or bleeding since then.    In July 2007, a benign colonic stricture was surgically removed.  Wound healing   was very slow.  In August 2007, her B12 level was low and she began vitamin B12   at a dose of 1 mg IM every three weeks and continues to take this.  She also   had a prolongation of the partial thromboplastin time.  No explanation for this   was found.  She eventually was given an empiric trial of vitamin K and the PTT   returned to normal and has remained normal.  She has not taken vitamin K for   several years.    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    In 2009, she had a faint IgG lambda paraprotein found in serum protein   electrophoresis.  In recent years, that has not been detectable.    She continues to have back pain, which is troubled her for many years.  She also   has diabetes mellitus, diastolic dysfunction and some left-sided weakness from   a prior CVA.  In late December 2016, she fell and fractured multiple bones in "   her pelvis.  She had a long hospitalization during which she developed   polyarthritis along with confusion and agitation.  Eventually, she improved to a   point that she was able to return home.  However, she can do little at home   other than dress herself and bathe herself.  She moves very slowly and she has a   great fear of falling again.    ADDITIONAL PAST HISTORY, SYSTEM REVIEW, SOCIAL HISTORY AND FAMILY HISTORY:  Have   been reviewed and updated in the electronic record.    PHYSICAL EXAMINATION:  GENERAL APPEARANCE:  A well-developed female who requires some assistance to   climb onto an examination table.  EYES:  No jaundice or pallor.  EARS:  Clear canals and membranes.  NOSE:  Clear nares.  Surgical scar on the nose.  MOUTH AND THROAT:  Partial left glossectomy.  No signs of tumor recurrence.    Adequate dentition.  NECK:  No masses or bruits.  No thyroid abnormalities.  LYMPH NODES:  No enlarged cervical, axillary or inguinal nodes.  CHEST AND LUNGS:  Normal respiratory effort.  Clear to auscultation and   percussion.  HEART:  Regular rate and rhythm without murmur or gallop.  ABDOMEN:  Soft without masses or tenderness.  No hepatosplenomegaly.  EXTREMITIES:  No edema or cyanosis.  PERIPHERAL VASCULATURE:  Adequate pulses in the feet and ankles.  NEUROLOGIC:  Mild weakness of the left arm and leg.  She is fully oriented.    Memory is good.  SKIN:  No suspicious lesions in the areas examined.    LABORATORY STUDIES:  Blood counts include hemoglobin 11.0, WBC 6660 and   platelets 363,000.  Vitamin B12 is 1795 and partial thromboplastin time is 22.7.    Comprehensive metabolic profile is normal with the exception of albumin 3.3.    IMPRESSION:  1.  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  2.  Vitamin B12 deficiency.  3.  Prior monoclonal gammopathy of undetermined significance.  4.  History of gastrointestinal bleeding, probably from arteriovenous   malformations.  5.   Malabsorption syndrome, likely a consequence of prior radiation and   surgery.    RECOMMENDATIONS:  1.  Continue vitamin B12 1 mg subcutaneously every three weeks.  2.  In six months, blood at [facility] for CBC, PTT and ferritin.  3.  Return visit in one year with CBC, CMP, vitamin B12 level, ferritin, partial   thromboplastin time and serum protein electrophoresis.  4.  I advised her to make a return appointment to see Dr. Huynh in the ENT   Department.    Mrs. Andrade and her  had a number of questions about her medical   situation and most of her 30-minute visit today was devoted to answering those   questions and counseling her about these issues.      SINTIA/CALIN  dd: 03/02/2017 13:26:00 (CST)  td: 03/03/2017 03:55:42 (CST)  Doc ID   #[id number]  Job ID #[id number]    CC:

## 2017-03-02 NOTE — MR AVS SNAPSHOT
[facility]  [address]  [facility] [address]  Phone: [phone]                  Marina Trimble   3/2/2017 1:00 PM   Appointment    Description:  Female : 1935   Provider:  Tim Hodges Jr., MD   Department:  [facility]                To Do List           Future Appointments        Provider Department Dept Phone    3/2/2017 1:00 PM Tim Hodges Jr., MD [place] [facility] Hematology Oncology [phone]    3/8/2017 3:00 PM DEREJE VIVAR 3 [facility] [phone]    6/15/2017 9:30 AM Debbie Woodruff DPM [facility] [phone]      Goals (5 Years of Data)     None      [facility] On Call     [facility] On Call Nurse Care Line -  Assistance  Registered nurses in the [facility] On Call Center provide clinical advisement, health education, appointment booking, and other advisory services.  Call for this free service at [phone].             Medications           Message regarding Medications     Verify the changes and/or additions to your medication regime listed below are the same as discussed with your clinician today.  If any of these changes or additions are incorrect, please notify your healthcare provider.             Verify that the below list of medications is an accurate representation of the medications you are currently taking.  If none reported, the list may be blank. If incorrect, please contact your healthcare provider. Carry this list with you in case of emergency.           Current Medications     acetaminophen (TYLENOL) 500 MG tablet Take 1 tablet (500 mg total) by mouth every 4 (four) hours as needed for Pain.    amitriptyline (ELAVIL) 10 MG tablet Take 10 mg by mouth once daily.     amlodipine (NORVASC) 5 MG tablet Take 1 tablet (5 mg total) by mouth once daily.    aspirin (ECOTRIN) 81 MG EC tablet Take 81 mg by mouth once daily.    atorvastatin (LIPITOR) 40 MG tablet TAKE 1 TABLET ONE TIME DAILY    BD ULTRA-FINE BEVERLY  "PEN NEEDLES 32 gauge x 5/32" Ndle USE TWICE DAILY    blood sugar diagnostic (BLOOD GLUCOSE TEST) Strp Use to check blood glucose 3 times daily. Device is Accucheck.    blood-glucose meter Misc Use ad directed (Accu Check Arianne smartview meter)    calcium carbonate (TUMS) 200 mg calcium (500 mg) chewable tablet Take 2 tablets (1,000 mg total) by mouth once daily.    clopidogrel (PLAVIX) 75 mg tablet TAKE 1 TABLET ONE TIME DAILY    cyanocobalamin 1,000 mcg/mL injection INJECT 1 ML UNDER THE SKIN EVERY 2 WEEKS    cycloSPORINE (RESTASIS) 0.05 % ophthalmic emulsion Place 0.4 mLs (1 drop total) into both eyes 2 (two) times daily.    insulin aspart (NOVOLOG FLEXPEN) 100 unit/mL InPn pen Inject 4/6/6 units into skin with correction scale 150-200 +1, 201-250 +2, 251-300 +3, 301-350 +4, >350 +5.    insulin detemir (LEVEMIR FLEXTOUCH) 100 unit/mL (3 mL) SubQ InPn pen Inject 33 Units into the skin every evening.    lancets (ACCU-CHEK SOFTCLIX LANCETS) Misc Use to check blood glucose 3 times daily    lidocaine (LIDODERM) 5 % Place 1 patch onto the skin once daily. Remove & Discard patch within 12 hours or as directed by MD    losartan (COZAAR) 50 MG tablet TAKE 1 TABLET ONE TIME DAILY    magnesium oxide (MAG-OX) 400 mg tablet Take 1 tablet (400 mg total) by mouth 2 (two) times daily.    multivitamin (CHEWABLE MULTI VITAMIN) Chew Take 2 tablets by mouth once daily.     naproxen (NAPROSYN) 500 MG tablet Take 1 tablet (500 mg total) by mouth 2 (two) times daily with meals.    NOVOFINE 32 32 gauge x 1/4" Ndle USE WITH LEVEMIR PEN EVERY EVENING    pantoprazole (PROTONIX) 40 MG tablet Take 1 tablet (40 mg total) by mouth once daily.    polyethylene glycol (GLYCOLAX) 17 gram PwPk Take 17 g by mouth daily as needed (constipation).    PROCTOZONE-HC 2.5 % rectal cream Place rectally 2 (two) times daily.    triamcinolone acetonide 0.1% (KENALOG) 0.1 % cream Apply topically 2 (two) times daily.    TRUEPLUS LANCETS 28 gauge Misc USE  TO CHECK " BLOOD SUGAR THREE TIMES DAILY    UNABLE TO FIND Places uses Wellpatch daily    vitamin D 1000 units Tab Take 1 tablet (1,000 Units total) by mouth once daily.    VOLTAREN 1 % Gel            Clinical Reference Information           Allergies as of 3/2/2017     Baclofen    Benazepril    Carisoprodol    Desloratadine    Hydrocodone    Methocarbamol    Omeprazole    Oxybutynin    Oxycodone    Tramadol    Benazepril-hydrochlorothiazide    Metformin    Prednisone      Immunizations Administered on Date of Encounter - 3/2/2017     None      Language Assistance Services     ATTENTION: Language assistance services are available, free of charge. Please call [phone].      ATENCIÓN: Si habla español, tiene a vasquez disposición servicios gratuitos de asistencia lingüística. Llame al [phone].     TOREY Ý: N?u b?n nói Ti?ng Vi?t, có các d?ch v? h? tr? ngôn ng? mi?n phí dành cho b?n. G?i s? [phone].         [facility] complies with applicable Federal civil rights laws and does not discriminate on the basis of race, color, national origin, age, disability, or sex.

## 2017-03-03 DIAGNOSIS — D50.0 ANEMIA DUE TO CHRONIC BLOOD LOSS: Primary | ICD-10-CM

## 2017-03-08 ENCOUNTER — OFFICE VISIT (OUTPATIENT)
Dept: FAMILY MEDICINE | Facility: CLINIC | Age: 82
End: 2017-03-08
Payer: MEDICARE

## 2017-03-08 VITALS
TEMPERATURE: 98 F | DIASTOLIC BLOOD PRESSURE: 56 MMHG | BODY MASS INDEX: 25.79 KG/M2 | WEIGHT: 131.38 LBS | OXYGEN SATURATION: 96 % | SYSTOLIC BLOOD PRESSURE: 120 MMHG | HEIGHT: 60 IN | HEART RATE: 106 BPM

## 2017-03-08 DIAGNOSIS — Z79.4 ENCOUNTER FOR LONG-TERM (CURRENT) USE OF INSULIN: ICD-10-CM

## 2017-03-08 DIAGNOSIS — M54.12 CERVICAL RADICULOPATHY: ICD-10-CM

## 2017-03-08 DIAGNOSIS — I10 ESSENTIAL HYPERTENSION: ICD-10-CM

## 2017-03-08 DIAGNOSIS — G60.9 HEREDITARY AND IDIOPATHIC PERIPHERAL NEUROPATHY: ICD-10-CM

## 2017-03-08 DIAGNOSIS — I70.0 ATHEROSCLEROSIS OF AORTA: ICD-10-CM

## 2017-03-08 DIAGNOSIS — E78.5 HYPERLIPIDEMIA, UNSPECIFIED HYPERLIPIDEMIA TYPE: ICD-10-CM

## 2017-03-08 DIAGNOSIS — C82.96 FOLLICULAR LYMPHOMA OF INTRAPELVIC LYMPH NODES, UNSPECIFIED FOLLICULAR LYMPHOMA TYPE: ICD-10-CM

## 2017-03-08 DIAGNOSIS — Z86.73 HISTORY OF CVA (CEREBROVASCULAR ACCIDENT): ICD-10-CM

## 2017-03-08 DIAGNOSIS — E11.42 DIABETIC POLYNEUROPATHY ASSOCIATED WITH TYPE 2 DIABETES MELLITUS: ICD-10-CM

## 2017-03-08 DIAGNOSIS — E11.69 COMBINED HYPERLIPIDEMIA ASSOCIATED WITH TYPE 2 DIABETES MELLITUS: ICD-10-CM

## 2017-03-08 DIAGNOSIS — M46.1 SACROILIITIS: ICD-10-CM

## 2017-03-08 DIAGNOSIS — Z87.81 HISTORY OF COMPRESSION FRACTURE OF VERTEBRAL COLUMN: ICD-10-CM

## 2017-03-08 DIAGNOSIS — E53.8 VITAMIN B12 DEFICIENCY: ICD-10-CM

## 2017-03-08 DIAGNOSIS — C85.96: ICD-10-CM

## 2017-03-08 DIAGNOSIS — E78.2 COMBINED HYPERLIPIDEMIA ASSOCIATED WITH TYPE 2 DIABETES MELLITUS: ICD-10-CM

## 2017-03-08 DIAGNOSIS — S32.810D MULTIPLE CLOSED FRACTURES OF PELVIS WITH STABLE DISRUPTION OF PELVIC CIRCLE WITH ROUTINE HEALING, SUBSEQUENT ENCOUNTER: ICD-10-CM

## 2017-03-08 DIAGNOSIS — I69.354 HEMIPARESIS AFFECTING LEFT SIDE AS LATE EFFECT OF STROKE: ICD-10-CM

## 2017-03-08 DIAGNOSIS — Z00.00 ENCOUNTER FOR PREVENTIVE HEALTH EXAMINATION: Primary | ICD-10-CM

## 2017-03-08 DIAGNOSIS — C02.3 MALIGNANT NEOPLASM OF ANTERIOR TWO-THIRDS OF TONGUE: ICD-10-CM

## 2017-03-08 DIAGNOSIS — R53.81 DEBILITY: ICD-10-CM

## 2017-03-08 PROCEDURE — G0439 PPPS, SUBSEQ VISIT: HCPCS | Mod: S$GLB,,, | Performed by: NURSE PRACTITIONER

## 2017-03-08 PROCEDURE — 3074F SYST BP LT 130 MM HG: CPT | Mod: S$GLB,,, | Performed by: NURSE PRACTITIONER

## 2017-03-08 PROCEDURE — 99499 UNLISTED E&M SERVICE: CPT | Mod: S$GLB,,, | Performed by: NURSE PRACTITIONER

## 2017-03-08 PROCEDURE — 99999 PR PBB SHADOW E&M-EST. PATIENT-LVL V: CPT | Mod: PBBFAC,,, | Performed by: NURSE PRACTITIONER

## 2017-03-08 PROCEDURE — 3078F DIAST BP <80 MM HG: CPT | Mod: S$GLB,,, | Performed by: NURSE PRACTITIONER

## 2017-03-08 NOTE — MR AVS SNAPSHOT
[facility]  [address]  [place] [address]  Phone: [phone]  Fax: [phone]                  Marina Trimble   3/8/2017 3:00 PM   Office Visit    Description:  Female : 1935   Provider:  HRA, LAPALCO 3   Department:  [facility] Medicine           Reason for Visit     Health Risk Assessment                To Do List           Future Appointments        Provider Department Dept Phone    3/24/2017 11:00 AM Nnamdi Huynh MD [facility] - Head/Neck Surg Onc [phone]    6/15/2017 9:30 AM Debbie Woodruff DPM [facility] Podiatry [phone]    2017 9:30 AM [facility], LAPALCO [facility] [phone]      Goals (5 Years of Data)     None      [facility]     [facility] Nurse Care Line -  Assistance  Registered nurses in the [facility] provide clinical advisement, health education, appointment booking, and other advisory services.  Call for this free service at [phone].             Medications           Message regarding Medications     Verify the changes and/or additions to your medication regime listed below are the same as discussed with your clinician today.  If any of these changes or additions are incorrect, please notify your healthcare provider.        STOP taking these medications     calcium carbonate (TUMS) 200 mg calcium (500 mg) chewable tablet Take 2 tablets (1,000 mg total) by mouth once daily.    lidocaine (LIDODERM) 5 % Place 1 patch onto the skin once daily. Remove & Discard patch within 12 hours or as directed by MD    magnesium oxide (MAG-OX) 400 mg tablet Take 1 tablet (400 mg total) by mouth 2 (two) times daily.    polyethylene glycol (GLYCOLAX) 17 gram PwPk Take 17 g by mouth daily as needed (constipation).           Verify that the below list of medications is an accurate representation of the medications you are currently taking.  If none reported, the list may be blank. If incorrect, please contact  "your healthcare provider. Carry this list with you in case of emergency.           Current Medications     acetaminophen (TYLENOL) 500 MG tablet Take 1 tablet (500 mg total) by mouth every 4 (four) hours as needed for Pain.    amitriptyline (ELAVIL) 10 MG tablet Take 10 mg by mouth once daily.     amlodipine (NORVASC) 5 MG tablet Take 1 tablet (5 mg total) by mouth once daily.    aspirin (ECOTRIN) 81 MG EC tablet Take 81 mg by mouth once daily.    atorvastatin (LIPITOR) 40 MG tablet TAKE 1 TABLET ONE TIME DAILY    BD ULTRA-FINE BEVERLY PEN NEEDLES 32 gauge x 5/32" Ndle USE TWICE DAILY    blood sugar diagnostic (BLOOD GLUCOSE TEST) Strp Use to check blood glucose 3 times daily. Device is AccMyows.    blood-glucose meter Misc Use ad directed (Accu Check Beverly smartview meter)    clopidogrel (PLAVIX) 75 mg tablet TAKE 1 TABLET ONE TIME DAILY    cyanocobalamin 1,000 mcg/mL injection INJECT 1 ML UNDER THE SKIN EVERY 2 WEEKS    cycloSPORINE (RESTASIS) 0.05 % ophthalmic emulsion Place 0.4 mLs (1 drop total) into both eyes 2 (two) times daily.    insulin aspart (NOVOLOG FLEXPEN) 100 unit/mL InPn pen Inject 4/6/6 units into skin with correction scale 150-200 +1, 201-250 +2, 251-300 +3, 301-350 +4, >350 +5.    insulin detemir (LEVEMIR FLEXTOUCH) 100 unit/mL (3 mL) SubQ InPn pen Inject 33 Units into the skin every evening.    lancets (ACCU-CHEK SOFTCLIX LANCETS) Misc Use to check blood glucose 3 times daily    losartan (COZAAR) 50 MG tablet TAKE 1 TABLET ONE TIME DAILY    multivitamin (CHEWABLE MULTI VITAMIN) Chew Take 2 tablets by mouth once daily.     naproxen (NAPROSYN) 500 MG tablet Take 1 tablet (500 mg total) by mouth 2 (two) times daily with meals.    NOVOFINE 32 32 gauge x 1/4" Ndle USE WITH LEVEMIR PEN EVERY EVENING    pantoprazole (PROTONIX) 40 MG tablet Take 1 tablet (40 mg total) by mouth once daily.    PROCTOZONE-HC 2.5 % rectal cream Place rectally 2 (two) times daily.    TRUEPLUS LANCETS 28 gauge Misc USE  TO CHECK " BLOOD SUGAR THREE TIMES DAILY    UNABLE TO FIND Places uses Wellpatch daily    vitamin D 1000 units Tab Take 1 tablet (1,000 Units total) by mouth once daily.    VOLTAREN 1 % Gel     triamcinolone acetonide 0.1% (KENALOG) 0.1 % cream Apply topically 2 (two) times daily.           Clinical Reference Information           Your Vitals Were     BP Pulse Temp Height Weight SpO2    120/56 (BP Location: Right arm, Patient Position: Sitting, BP Method: Manual) 106 98.4 °F (36.9 °C) (Oral) 5' (1.524 m) 59.6 kg (131 lb 6.3 oz) 96%    BMI                25.66 kg/m2          Blood Pressure          Most Recent Value    BP  (!)  120/56      Allergies as of 3/8/2017     Baclofen    Benazepril    Carisoprodol    Desloratadine    Hydrocodone    Methocarbamol    Omeprazole    Oxybutynin    Oxycodone    Tramadol    Benazepril-hydrochlorothiazide    Metformin    Prednisone      Immunizations Administered on Date of Encounter - 3/8/2017     None      Language Assistance Services     ATTENTION: Language assistance services are available, free of charge. Please call [phone].      ATENCIÓN: Si habla alvino, tiene a vasquez disposición servicios gratuitos de asistencia lingüística. Llame al [phone].     TOREY Ý: N?u b?n nói Ti?ng Vi?t, có các d?ch v? h? tr? ngôn ng? mi?n phí dành cho b?n. G?i s? [phone].         [facility] complies with applicable Federal civil rights laws and does not discriminate on the basis of race, color, national origin, age, disability, or sex.

## 2017-03-11 NOTE — PATIENT INSTRUCTIONS
Counseling and Referral of Other Preventative  (Italic type indicates deductible and co-insurance are waived)    Patient Name: Marina Trimble  Today's Date: 3/10/2017      SERVICE LIMITATIONS RECOMMENDATION    Vaccines    · Pneumococcal (once after 65)    · Influenza (annually)    · Hepatitis B (if medium/high risk)    · Prevnar 13      Hepatitis B medium/high risk factors:       - End-stage renal disease       - Hemophiliacs who received Factor VII or         IX concentrates       - Clients of institutions for the mentally             retarded       - Persons who live in the same house as          a HepB carrier       - Homosexual men       - Illicit injectable drug abusers     Pneumococcal: Recommended to patient     Influenza: Done, repeat in one year     Hepatitis B: N/A         Mammogram (biennial age 50-74)  Annually (age 40 or over)  Last done 5/2016, recommend to repeat every 1  years    Pap (up to age 70 and after 70 if unknown history or abnormal study last 10 years)    N/A     n/a no clinical risk factors     Colorectal cancer screening (to age 75)    · Fecal occult blood test (annual)  · Flexible sigmoidoscopy (5y)  · Screening colonoscopy (10y)  · Barium enema   Last done 2/2013, recommend to repeat every 5  years    Diabetes self-management training (no USPSTF recommendations)  Requires referral by treating physician for patient with diabetes or renal disease. 10 hours of initial DSMT sessions of no less than 30 minutes each in a continuous 12-month period. 2 hours of follow-up DSMT in subsequent years.  per patient and PCP     Bone mass measurements (age 65 & older, biennial)  Requires diagnosis related to osteoporosis or estrogen deficiency. Biennial benefit unless patient has history of long-term glucocorticoid  Last done 10/2016, recommend to repeat every 3  years    Glaucoma screening (no USPSTF recommendation)  Diabetes mellitus, family history   , age 50 or over     American, age 65 or over  Last done 8/2016, recommend to repeat every 1  years    Medical nutrition therapy for diabetes or renal disease (no recommended schedule)  Requires referral by treating physician for patient with diabetes or renal disease or kidney transplant within the past 3 years.  Can be provided in same year as diabetes self-management training (DSMT), and CMS recommends medical nutrition therapy take place after DSMT. Up to 3 hours for initial year and 2 hours in subsequent years.  Recommended to patient    Cardiovascular screening blood tests (every 5 years)  · Fasting lipid panel  Order as a panel if possible  Recommended to patient, orders are active     Diabetes screening tests (at least every 3 years, Medicare covers annually or at 6-month intervals for prediabetic patients)  · Fasting blood sugar (FBS) or glucose tolerance test (GTT)  Patient must be diagnosed with one of the following:       - Hypertension       - Dyslipidemia       - Obesity (BMI 30kg/m2)       - Previous elevated impaired FBS or GTT       ... or any two of the following:       - Overweight (BMI 25 but <30)       - Family history of diabetes       - Age 65 or older       - History of gestational diabetes or birth of baby weighing more than 9 pounds  Last done 12/2016, recommend to repeat every 6  months    Abdominal aortic aneurysm screening (once)  · Sonogram   Limited to patients who meet one of the following criteria:       - Men who are 65-75 years old and have smoked more than 100 cigarette in their lifetime       - Anyone with a family history of abdominal aortic aneurysm       - Anyone recommended for screening by the USPSTF  N/A, completed June 2011    HIV screening (annually for increased risk patients)  · HIV-1 and HIV-2 by EIA, or MELVINA, rapid antibody test or oral mucosa transudate  Patients must be at increased risk for HIV infection per USPSTF guidelines or pregnant. Tests covered annually for patient at  increased risk or as requested by the patient. Pregnant patients may receive up to 3 tests during pregnancy.  Risks discussed, screening is not recommended, no clinical risk factors     Smoking cessation counseling (up to 8 sessions per year)  Patients must be asymptomatic of tobacco-related conditions to receive as a preventative service.  no clinical risk factors     Subsequent annual wellness visit  At least 12 months since last AWV  Return in one year     The following information is provided to all patients.  This information is to help you find resources for any of the problems found today that may be affecting your health:                Living healthy guide: www.[facility].[place].[facility]      Understanding Diabetes: www.diabetes.org      Eating healthy: www.cdc.gov/healthyweight      CDC home safety checklist: www.cdc.gov/steadi/patient.html      Agency on Aging: www.goea.[place].[facility]      Alcoholics anonymous (AA): www.aa.org      Physical Activity: www.kelin.nih.gov/vi9osus      Tobacco use: www.quitwithusla.org

## 2017-03-11 NOTE — PROGRESS NOTES
Marina Trimble presented for a  Medicare AWV and comprehensive Health Risk Assessment today. The following components were reviewed and updated:    · Medical history  · Family History  · Social history  · Allergies and Current Medications  · Health Risk Assessment  · Health Maintenance  · Care Team     ** See Completed Assessments for Annual Wellness Visit within the encounter summary.**       The following assessments were completed:  · Living Situation  · CAGE  · Depression Screening  · Timed Get Up and Go  · Whisper Test  · Cognitive Function Screening  · Nutrition Screening  · ADL Screening  · PAQ Screening    Vitals:    03/08/17 1501   BP: (!) 120/56   BP Location: Right arm   Patient Position: Sitting   BP Method: Manual   Pulse: 106   Temp: 98.4 °F (36.9 °C)   TempSrc: Oral   SpO2: 96%   Weight: 59.6 kg (131 lb 6.3 oz)   Height: 5' (1.524 m)     Body mass index is 25.66 kg/(m^2).  Physical Exam   Constitutional: She is oriented to person, place, and time. She is active. She is easily aroused.   Cardiovascular: Regular rhythm and normal heart sounds.  Tachycardia present.    Pulmonary/Chest: Effort normal.   Neurological: She is alert, oriented to person, place, and time and easily aroused.   Skin: Skin is warm.   Psychiatric: She has a normal mood and affect. Her behavior is normal. Thought content normal.   Vitals reviewed.        Diagnoses and health risks identified today and associated recommendations/orders:    1. Encounter for preventive health examination  Education provided about preventive health examinations and procedures; discussed patient's health concerns, holistically addressed patient's health plan.  Reviewed medical record per discussion with patient for health risks, which are addressed in this documentation.     2. 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     3. Multiple closed fractures of pelvis with stable  disruption of pelvic [place] with routine healing, subsequent encounter  Stable, followed by [facility] orthopedics; continue as directed.     4. Sacroiliitis  Stable, followed by [facility] orthopedics; continue as directed.     5. Follicular lymphoma of intrapelvic lymph nodes, unspecified follicular lymphoma type  Stable, followed by [facility] hematology/oncology; continue as directed.     6. 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  Reviewed Ha1c (9.2- Dec 2016), complications associated with uncontrolled diabetes.     7. Type II or unspecified type diabetes mellitus with other specified manifestations, uncontrolled  Education provided about diabetes, management of blood glucose with diet and activities, monitoring for worsening effects of diabetes.  Reviewed Ha1c (9.2- Dec 2016), complications associated with uncontrolled diabetes.     8. Diabetic polyneuropathy associated with type 2 diabetes mellitus  Education provided about diabetes, management of blood glucose with diet and activities, monitoring for worsening effects of diabetes.  Reviewed Ha1c (9.2- Dec 2016), complications associated with uncontrolled diabetes. Patient is followed by [facility] podiatry.     9. Diabetes mellitus with neurological manifestations, uncontrolled  Education provided about diabetes, management of blood glucose with diet and activities, monitoring for worsening effects of diabetes.  Reviewed Ha1c (9.2- Dec 2016), complications associated with uncontrolled diabetes. Patient is followed by [facility] podiatry.     10. Encounter for long-term (current) use of insulin  Education provided about diabetes, management of blood glucose with diet and activities, monitoring for worsening effects of diabetes.  Reviewed Ha1c (9.2- Dec 2016), complications associated with uncontrolled diabetes.     11. Other malignant  lymphomas of intrapelvic lymph nodes  Stable, followed by [facility] hematology/oncology; continue as directed.     12. Atherosclerosis of aorta  Stable, asymptomatic on ASA, STATIN, and antihypertensives with good control; monitor    13. Hemiparesis affecting left side as late effect of stroke  Stable, patient was followed by outpatient rehabilitation.     14. Debility  Stable, use rollator for gait stability.     15. Cervical radiculopathy  Stable, discussed ADLs and sleep hygiene.     16. Essential hypertension  The current medical regimen is effective;  continue present plan and medications.    17. Hyperlipidemia, unspecified hyperlipidemia type  Need updated lipid panel, orders are active, patient consumed food prior to appointment today.  Referenced 2015 lipid panel. Continue with dietary and lifestyle modifications.     18. Hereditary and idiopathic peripheral neuropathy  Stable, followed by [facility] podiatry.     19. Vitamin B12 deficiency  Stable, followed by [facility] hematology.     20. History of CVA (cerebrovascular accident)  Stable, monitor.     21. History of compression fracture of vertebral column  Stable, followed by [facility] orthopedics.       Provided Marina with a 5-10 year written screening schedule and personal prevention plan. Recommendations were developed using the USPSTF age appropriate recommendations. Education, counseling, and referrals were provided as needed. After Visit Summary printed and given to patient which includes a list of additional screenings\tests needed.    No Follow-up on file.    Aguila Shaffer Jr, NP

## 2017-03-14 DIAGNOSIS — E11.69 COMBINED HYPERLIPIDEMIA ASSOCIATED WITH TYPE 2 DIABETES MELLITUS: ICD-10-CM

## 2017-03-14 DIAGNOSIS — E78.2 COMBINED HYPERLIPIDEMIA ASSOCIATED WITH TYPE 2 DIABETES MELLITUS: ICD-10-CM

## 2017-03-14 RX ORDER — ATORVASTATIN CALCIUM 40 MG/1
TABLET, FILM COATED ORAL
Qty: 90 TABLET | Refills: 0 | OUTPATIENT
Start: 2017-03-14

## 2017-03-16 RX ORDER — CLOPIDOGREL BISULFATE 75 MG/1
TABLET ORAL
Qty: 90 TABLET | Refills: 3 | Status: SHIPPED | OUTPATIENT
Start: 2017-03-16 | End: 2018-04-26 | Stop reason: SDUPTHER

## 2017-03-17 DIAGNOSIS — E78.2 COMBINED HYPERLIPIDEMIA ASSOCIATED WITH TYPE 2 DIABETES MELLITUS: ICD-10-CM

## 2017-03-17 DIAGNOSIS — E11.69 COMBINED HYPERLIPIDEMIA ASSOCIATED WITH TYPE 2 DIABETES MELLITUS: ICD-10-CM

## 2017-03-17 RX ORDER — ATORVASTATIN CALCIUM 40 MG/1
TABLET, FILM COATED ORAL
Qty: 90 TABLET | Refills: 3 | Status: SHIPPED | OUTPATIENT
Start: 2017-03-17 | End: 2018-04-26 | Stop reason: SDUPTHER

## 2017-04-13 ENCOUNTER — OFFICE VISIT (OUTPATIENT)
Dept: OTOLARYNGOLOGY | Facility: CLINIC | Age: 82
End: 2017-04-13
Payer: MEDICARE

## 2017-04-13 VITALS
HEART RATE: 87 BPM | TEMPERATURE: 96 F | DIASTOLIC BLOOD PRESSURE: 75 MMHG | BODY MASS INDEX: 26.48 KG/M2 | WEIGHT: 135.56 LBS | SYSTOLIC BLOOD PRESSURE: 148 MMHG

## 2017-04-13 DIAGNOSIS — D03.39: ICD-10-CM

## 2017-04-13 DIAGNOSIS — E11.9 DIABETES MELLITUS WITHOUT COMPLICATION: ICD-10-CM

## 2017-04-13 DIAGNOSIS — Z85.810 HISTORY OF TONGUE CANCER: Primary | ICD-10-CM

## 2017-04-13 PROCEDURE — 99999 PR PBB SHADOW E&M-EST. PATIENT-LVL III: CPT | Mod: PBBFAC,,, | Performed by: OTOLARYNGOLOGY

## 2017-04-13 PROCEDURE — 1160F RVW MEDS BY RX/DR IN RCRD: CPT | Mod: S$GLB,,, | Performed by: OTOLARYNGOLOGY

## 2017-04-13 PROCEDURE — 3077F SYST BP >= 140 MM HG: CPT | Mod: S$GLB,,, | Performed by: OTOLARYNGOLOGY

## 2017-04-13 PROCEDURE — 3078F DIAST BP <80 MM HG: CPT | Mod: S$GLB,,, | Performed by: OTOLARYNGOLOGY

## 2017-04-13 PROCEDURE — 1159F MED LIST DOCD IN RCRD: CPT | Mod: S$GLB,,, | Performed by: OTOLARYNGOLOGY

## 2017-04-13 PROCEDURE — 1126F AMNT PAIN NOTED NONE PRSNT: CPT | Mod: S$GLB,,, | Performed by: OTOLARYNGOLOGY

## 2017-04-13 PROCEDURE — 99213 OFFICE O/P EST LOW 20 MIN: CPT | Mod: S$GLB,,, | Performed by: OTOLARYNGOLOGY

## 2017-04-13 NOTE — MR AVS SNAPSHOT
[facility] - Head/Neck Surg Onc  [address]  [facility] [address]  Phone: [phone]  Fax: [phone]                  Marina Trimble   2017 10:15 AM   Office Visit    Description:  Female : 1935   Provider:  Nnamdi Huynh MD   Department[facility]  [facility] - Head[facility]Ne[facility] Surg Onc           Reason for Visit     Follow-up           Diagnoses this Visit        Comments    History of tongue cancer    -  Primary     Lentigo maligna in situ of nose                To Do List           Future Appointments        Provider Department Dept Phone    6/15/2017 9:30 AM Debbie Woodruff DPM [facility] Podiatry [phone]    2017 9:30 AM [facility], [facility] [phone]      Goals (5 Years of Data)     None      Follow-Up and Disposition     Return if symptoms worsen or fail to improve.      [facility]     [facility] Care Line -  Assistance  Unless otherwise directed by your provider, please contact [facility], our nurse care line that is available for  assistance.     Registered nurses in the [facility] provide: appointment scheduling, clinical advisement, health education, and other advisory services.  Call: [phone] (toll free)               Medications           Message regarding Medications     Verify the changes and/or additions to your medication regime listed below are the same as discussed with your clinician today.  If any of these changes or additions are incorrect, please notify your healthcare provider.             Verify that the below list of medications is an accurate representation of the medications you are currently taking.  If none reported, the list may be blank. If incorrect, please contact your healthcare provider. Carry this list with you in case of emergency.           Current Medications     acetaminophen (TYLENOL) 500 MG tablet Take 1 tablet (500 mg total) by mouth every 4 (four) hours as needed for Pain.  "   amitriptyline (ELAVIL) 10 MG tablet Take 10 mg by mouth once daily.     amlodipine (NORVASC) 5 MG tablet Take 1 tablet (5 mg total) by mouth once daily.    aspirin (ECOTRIN) 81 MG EC tablet Take 81 mg by mouth once daily.    atorvastatin (LIPITOR) 40 MG tablet TAKE 1 TABLET ONE TIME DAILY    BD ULTRA-FINE BEVERLY PEN NEEDLES 32 gauge x 5/32" Ndle USE TWICE DAILY    blood sugar diagnostic (BLOOD GLUCOSE TEST) Strp Use to check blood glucose 3 times daily. Device is Accucheck.    blood-glucose meter Misc Use ad directed (Accu Check Beverly smartview meter)    clopidogrel (PLAVIX) 75 mg tablet TAKE 1 TABLET ONE TIME DAILY    cyanocobalamin 1,000 mcg/mL injection INJECT 1 ML UNDER THE SKIN EVERY 2 WEEKS    cycloSPORINE (RESTASIS) 0.05 % ophthalmic emulsion Place 0.4 mLs (1 drop total) into both eyes 2 (two) times daily.    insulin aspart (NOVOLOG FLEXPEN) 100 unit/mL InPn pen Inject 4/6/6 units into skin with correction scale 150-200 +1, 201-250 +2, 251-300 +3, 301-350 +4, >350 +5.    lancets (ACCU-CHEK SOFTCLIX LANCETS) Misc Use to check blood glucose 3 times daily    LEVEMIR FLEXTOUCH 100 unit/mL (3 mL) InPn pen INJECT 35 UNITS INTO THE SKIN EVERY EVENING.    losartan (COZAAR) 50 MG tablet TAKE 1 TABLET ONE TIME DAILY    multivitamin (CHEWABLE MULTI VITAMIN) Chew Take 2 tablets by mouth once daily.     naproxen (NAPROSYN) 500 MG tablet Take 1 tablet (500 mg total) by mouth 2 (two) times daily with meals.    NOVOFINE 32 32 gauge x 1/4" Ndle USE WITH LEVEMIR PEN EVERY EVENING    pantoprazole (PROTONIX) 40 MG tablet Take 1 tablet (40 mg total) by mouth once daily.    PROCTOZONE-HC 2.5 % rectal cream Place rectally 2 (two) times daily.    triamcinolone acetonide 0.1% (KENALOG) 0.1 % cream Apply topically 2 (two) times daily.    TRUEPLUS LANCETS 28 gauge Misc USE  TO CHECK BLOOD SUGAR THREE TIMES DAILY    UNABLE TO FIND Places uses Wellpatch daily    vitamin D 1000 units Tab Take 1 tablet (1,000 Units total) by mouth once " daily.    VOLTAREN 1 % Gel            Clinical Reference Information           Your Vitals Were     BP Pulse Temp Weight BMI    148/75 87 95.8 °F (35.4 °C) 61.5 kg (135 lb 9.3 oz) 26.48 kg/m2      Blood Pressure          Most Recent Value    BP  (!)  148/75      Allergies as of 4/13/2017     Baclofen    Benazepril    Carisoprodol    Desloratadine    Hydrocodone    Methocarbamol    Omeprazole    Oxybutynin    Oxycodone    Tramadol    Benazepril-hydrochlorothiazide    Metformin    Prednisone      Immunizations Administered on Date of Encounter - 4/13/2017     None      [facility] Sign-Up     Activating your [facility] account is as easy as 1-2-3!     1) Visit [email or web address], select Sign Up Now, enter this activation code and your date of birth, then select Next.  Activation code not generated  Current Patient Portal Status: Account disabled      2) Create a username and password to use when you visit [facility] in the future and select a security question in case you lose your password and select Next.    3) Enter your e-mail address and click Sign Up!    Additional Information  If you have questions, please e-mail [email or web address] or call [phone] to talk to our [facility] staff. Remember, [facility] is NOT to be used for urgent needs. For medical emergencies, dial 911.         Instructions    Please be aware of the symptoms of head and neck cancer -  including, but not limited to, swelling in the neck, changes in voice and swallowing, and pain -- contact me if any of these symptoms appear and persist for more than 3-4 weeks.          Language Assistance Services     ATTENTION: Language assistance services are available, free of charge. Please call [phone].      ATENCIÓN: Si habla español, tiene a vasquez disposición servicios gratuitos de asistencia lingüística. Llcharlie al [phone].     TOREY Ý: N?u b?n nói Ti?ng Vi?t, có các d?ch v? h? tr? ngôn ng? mi?n phí dành cho b?n. G?i s? [phone].         [facility] complies with applicable Federal civil rights laws and does not discriminate on the basis of race, color, national origin, age, disability, or sex.

## 2017-04-20 RX ORDER — INSULIN DETEMIR 100 [IU]/ML
INJECTION, SOLUTION SUBCUTANEOUS
Qty: 30 ML | Refills: 4 | Status: SHIPPED | OUTPATIENT
Start: 2017-04-20 | End: 2018-04-26 | Stop reason: SDUPTHER

## 2017-04-23 NOTE — PROGRESS NOTES
[facility]    Subjective:       Patient ID: Marina Trimble is a 81 y.o. female.    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 Trimble returns for another visit about her tongue cancer and lentigo maligna. She has no complaints today. She is almost 7 years out from her tongue surgery, and is now over 5 years removed from the MIS resection that was followed by topical Aldara therapy. She continues to follow with Dr. Hodges, and she suffered a stroke in June of 2013. She is talking fine and has no pain. She has no pressing skin cancer issues, either. She has not seen dermatology in some time.     She is breathing comfortably and eating a regular diet. She denies dysphagia, odynophagia, throat pain, and otalgia. Her voice is normal. There is no hemoptysis or hematemesis. Her speech is fine - she has no trouble being understood, except by her  whose hearing (or listening, depending on perspective) sometimes presents her a challenge. They still live together independently.    Past Medical History:   Diagnosis Date    Anemia     Aneurysm of aorta     Back pain     Cataract     Diabetes mellitus     sees Endocrine yearly ??   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    Melanoma 2011    melanoma in situ of R nasal tip, excised with FTSG and treated with Aldara    MGUS (monoclonal gammopathy of unknown significance)     Dr Hodges    Osteopenia     Senile osteoporosis     Skin cancer     Skin neoplasm     Stroke     left weak -     Tongue cancer 2010    [id number]    Trouble in sleeping     Urinary incontinence     Vitamin B 12 deficiency     Vitamin B12 deficiency     Vitamin K deficiency        Past Surgical History:   Procedure Laterality Date    APPENDECTOMY      CATARACT EXTRACTION W/  INTRAOCULAR LENS IMPLANT Left 10/16/14    OS ()    CHOLECYSTECTOMY      COLECTOMY      eyelid surgery      HYSTERECTOMY      PARTIAL GLOSSECTOMY  2010    SELECTIVE NECK DISSECTION  2010    tonsilectomy      TONSILLECTOMY           Current Outpatient Prescriptions:     acetaminophen (TYLENOL) 500 MG tablet, Take 1 tablet (500 mg total) by mouth every 4 (four) hours as needed for Pain., Disp: , Rfl: 0    amitriptyline (ELAVIL) 10 MG tablet, Take 10 mg by mouth once daily. , Disp: , Rfl:     amlodipine (NORVASC) 5 MG tablet, Take 1 tablet (5 mg total) by mouth once daily., Disp: 90 tablet, Rfl: 4    aspirin (ECOTRIN) 81 MG EC tablet, Take 81 mg by mouth once daily., Disp: , Rfl:     atorvastatin (LIPITOR) 40 MG tablet, TAKE 1 TABLET ONE TIME DAILY, Disp: 90 tablet, Rfl: 3    BD ULTRA-FINE BEVERLY PEN NEEDLES 32 gauge x 5/32" Ndle, USE TWICE DAILY, Disp: 500 each, Rfl: 12    blood sugar diagnostic (BLOOD GLUCOSE TEST) Strp, Use to check blood glucose 3 times daily. Device is Accucheck., Disp: 100 each, Rfl: 11    blood-glucose meter Misc, Use ad directed (Accu Check Beverly smartview meter), Disp: 1 each, Rfl: 4    clopidogrel (PLAVIX) 75 mg tablet, TAKE 1 TABLET ONE TIME DAILY, Disp: 90 tablet, Rfl: 3    cyanocobalamin 1,000 mcg/mL " "injection, INJECT 1 ML UNDER THE SKIN EVERY 2 WEEKS (Patient taking differently: INJECT 1 ML UNDER THE SKIN EVERY 3 WEEKS), Disp: 6 mL, Rfl: 10    cycloSPORINE (RESTASIS) 0.05 % ophthalmic emulsion, Place 0.4 mLs (1 drop total) into both eyes 2 (two) times daily., Disp: 180 vial, Rfl: 12    insulin aspart (NOVOLOG FLEXPEN) 100 unit/mL InPn pen, Inject 4/6/6 units into skin with correction scale 150-200 +1, 201-250 +2, 251-300 +3, 301-350 +4, >350 +5., Disp: 6 mL, Rfl: 4    lancets (ACCU-CHEK SOFTCLIX LANCETS) Misc, Use to check blood glucose 3 times daily, Disp: 100 each, Rfl: 11    LEVEMIR FLEXTOUCH 100 unit/mL (3 mL) InPn pen, INJECT 35 UNITS INTO THE SKIN EVERY EVENING., Disp: 30 mL, Rfl: 4    losartan (COZAAR) 50 MG tablet, TAKE 1 TABLET ONE TIME DAILY, Disp: 90 tablet, Rfl: 12    multivitamin (CHEWABLE MULTI VITAMIN) Chew, Take 2 tablets by mouth once daily. , Disp: , Rfl:     naproxen (NAPROSYN) 500 MG tablet, Take 1 tablet (500 mg total) by mouth 2 (two) times daily with meals., Disp: 11 tablet, Rfl: 0    NOVOFINE 32 32 gauge x 1/4" Ndle, USE WITH LEVEMIR PEN EVERY EVENING, Disp: 100 each, Rfl: 3    pantoprazole (PROTONIX) 40 MG tablet, Take 1 tablet (40 mg total) by mouth once daily., Disp: 30 tablet, Rfl: 3    PROCTOZONE-HC 2.5 % rectal cream, Place rectally 2 (two) times daily., Disp: 90 g, Rfl: 12    triamcinolone acetonide 0.1% (KENALOG) 0.1 % cream, Apply topically 2 (two) times daily., Disp: 45 g, Rfl: 3    TRUEPLUS LANCETS 28 gauge Misc, USE  TO CHECK BLOOD SUGAR THREE TIMES DAILY, Disp: 300 each, Rfl: 0    UNABLE TO FIND, Places uses Wellpatch daily, Disp: , Rfl:     vitamin D 1000 units Tab, Take 1 tablet (1,000 Units total) by mouth once daily., Disp: , Rfl:     VOLTAREN 1 % Gel, , Disp: , Rfl:     Review of patient's allergies indicates:   Allergen Reactions    Baclofen Nausea And Vomiting and Other (See Comments)     Dizziness    Benazepril      Other reaction(s): Rash,Swelling "    Carisoprodol      Other reaction(s): Dysphoria    Desloratadine      Other reaction(s): Nausea  Other reaction(s): Nausea    Hydrocodone      Other reaction(s): disorientation    Methocarbamol      Other reaction(s): Nausea    Omeprazole Nausea And Vomiting    Oxybutynin      Other reaction(s): Hallucinations    Oxycodone Nausea And Vomiting    Tramadol      Other reaction(s): dizziness    Benazepril-hydrochlorothiazide Rash     Other reaction(s): Swelling    Metformin Diarrhea    Prednisone Rash       Social History     Social History    Marital status:      Spouse name: N/A    Number of children: N/A    Years of education: N/A     Occupational History    Not on file.     Social History Main Topics    Smoking status: Never Smoker    Smokeless tobacco: Never Used    Alcohol use No    Drug use: No    Sexual activity: No     Other Topics Concern    Are You Pregnant Or Think You May Be? No    Breast-Feeding No     Social History Narrative    She is . She lives on the [facility].       Family History   Problem Relation Age of Onset    Coronary artery disease      COPD      Skin cancer Neg Hx     Melanoma Neg Hx      Review of Systems   Constitutional: Negative for activity change, appetite change, chills, fatigue, fever and unexpected weight change.   HENT: Negative for congestion, dental problem, drooling, ear discharge, ear pain, facial swelling, hearing loss, mouth sores, nosebleeds, postnasal drip, rhinorrhea, sinus pressure, sneezing, sore throat, tinnitus, trouble swallowing and voice change.    Eyes: Negative for pain and visual disturbance.   Respiratory: Negative for cough, choking and shortness of breath.    Cardiovascular: Negative for chest pain, palpitations and leg swelling.   Gastrointestinal: Negative for abdominal pain, constipation, diarrhea, nausea and vomiting.   Endocrine: Negative for cold intolerance and heat intolerance.   Genitourinary: Negative for  difficulty urinating, dysuria and hematuria.   Musculoskeletal: Positive for arthralgias, back pain, gait problem and neck pain. Negative for myalgias and neck stiffness.   Skin: Negative for rash and wound.   Allergic/Immunologic: Negative for environmental allergies and immunocompromised state.   Neurological: Negative for dizziness, facial asymmetry, speech difficulty, weakness, light-headedness, numbness and headaches.   Hematological: Negative for adenopathy. Does not bruise/bleed easily.   Psychiatric/Behavioral: Negative for dysphoric mood. The patient is not nervous/anxious.        Objective:      Physical Exam   Constitutional: She is oriented to person, place, and time. Vital signs are normal. She appears well-developed and well-nourished.   HENT:   Head: Normocephalic and atraumatic.   Right Ear: Hearing, tympanic membrane, external ear and ear canal normal.   Left Ear: Hearing, tympanic membrane, external ear and ear canal normal.   Nose: No rhinorrhea, nasal deformity or septal deviation. Right sinus exhibits no maxillary sinus tenderness and no frontal sinus tenderness. Left sinus exhibits no maxillary sinus tenderness and no frontal sinus tenderness.       Mouth/Throat: Uvula is midline, oropharynx is clear and moist and mucous membranes are normal. No oral lesions. No trismus in the jaw. No lacerations. No posterior oropharyngeal edema.       NP: No lesions of posterior wall  OP: No lesions of posterior wall or BOT. BOT is soft to palpation  Larynx: No lesions of glottic or supraglottic larynx. Normal vocal fold mobility    HP: No lesions of pyriform sinuses or postcricoid region  Mirror examination was performed.     Eyes: EOM and lids are normal. Pupils are equal, round, and reactive to light. Right conjunctiva is not injected. Left conjunctiva is not injected.   Neck: Full passive range of motion without pain and phonation normal. No JVD present. No tracheal deviation present. No thyroid mass and  no thyromegaly present.       Cardiovascular: Normal rate, regular rhythm, normal heart sounds and normal pulses.    Pulmonary/Chest: Effort normal and breath sounds normal. No stridor. No respiratory distress. She has no wheezes.   Abdominal: She exhibits no distension. There is no guarding.   Musculoskeletal: She exhibits no edema or tenderness.   Lymphadenopathy:     She has no cervical adenopathy.        Right cervical: No deep cervical and no posterior cervical adenopathy present.       Left cervical: No deep cervical and no posterior cervical adenopathy present.        Right: No supraclavicular adenopathy present.        Left: No supraclavicular adenopathy present.   Neurological: She is alert and oriented to person, place, and time. No cranial nerve deficit. Gait normal.   Skin: No lesion and no rash noted. No cyanosis or erythema. Nails show no clubbing.   Psychiatric: She has a normal mood and affect. Her speech is normal.   Vitals reviewed.      Assessment:       1. History of tongue cancer     2. Lentigo maligna in situ of nose       Plan:       She has no evidence of recurrent disease, either in the oral cavity or on the nose. She needs a dermatology evaluation, however, and I have recommended that she call and make an appointment. She stated that she will.    I would like to see her back in one year, sooner as needed.     The patient was made aware of the symptoms of head and neck cancer -  including, but not limited to, swelling in the neck, changes in voice and swallowing, and pain -- the patient was instructed to contact me if any of these symptoms appear and persist for more than 3-4 weeks.  Time was allowed for questions, and all questions were answered to the patient's apparent satisfaction.

## 2017-04-27 ENCOUNTER — TELEPHONE (OUTPATIENT)
Dept: FAMILY MEDICINE | Facility: CLINIC | Age: 82
End: 2017-04-27

## 2017-04-27 DIAGNOSIS — Z12.31 SCREENING MAMMOGRAM, ENCOUNTER FOR: Primary | ICD-10-CM

## 2017-04-27 NOTE — TELEPHONE ENCOUNTER
----- Message from Corrie Mojica sent at 4/27/2017  8:51 AM CDT -----  Contact: SELF  Pt is requesting a mammo. Please advise. [phone]

## 2017-05-03 RX ORDER — TRIAMCINOLONE ACETONIDE 1 MG/G
CREAM TOPICAL 2 TIMES DAILY
Qty: 45 G | Refills: 3 | Status: SHIPPED | OUTPATIENT
Start: 2017-05-03 | End: 2017-05-13

## 2017-05-03 NOTE — TELEPHONE ENCOUNTER
----- Message from Bianca Chavez sent at 5/3/2017 12:34 PM CDT -----  Contact: self  [phone]  Pt is requesting a refill on Triamenlone cream. Pls call Deloris   [phone]. Thanks.......Mari

## 2017-05-10 ENCOUNTER — HOSPITAL ENCOUNTER (OUTPATIENT)
Dept: RADIOLOGY | Facility: HOSPITAL | Age: 82
Discharge: HOME | End: 2017-05-10
Attending: INTERNAL MEDICINE
Payer: MEDICARE

## 2017-05-10 DIAGNOSIS — Z12.31 SCREENING MAMMOGRAM, ENCOUNTER FOR: ICD-10-CM

## 2017-05-10 PROCEDURE — 77063 BREAST TOMOSYNTHESIS BI: CPT | Mod: 26,,, | Performed by: RADIOLOGY

## 2017-05-10 PROCEDURE — 77067 SCR MAMMO BI INCL CAD: CPT | Mod: TC

## 2017-05-10 PROCEDURE — 77067 SCR MAMMO BI INCL CAD: CPT | Mod: 26,,, | Performed by: RADIOLOGY

## 2017-06-12 ENCOUNTER — OFFICE VISIT (OUTPATIENT)
Dept: FAMILY MEDICINE | Facility: CLINIC | Age: 82
End: 2017-06-12
Payer: MEDICARE

## 2017-06-12 ENCOUNTER — HOSPITAL ENCOUNTER (OUTPATIENT)
Dept: RADIOLOGY | Facility: HOSPITAL | Age: 82
Discharge: HOME | End: 2017-06-12
Attending: NURSE PRACTITIONER
Payer: MEDICARE

## 2017-06-12 ENCOUNTER — TELEPHONE (OUTPATIENT)
Dept: FAMILY MEDICINE | Facility: CLINIC | Age: 82
End: 2017-06-12

## 2017-06-12 VITALS
HEART RATE: 91 BPM | TEMPERATURE: 98 F | SYSTOLIC BLOOD PRESSURE: 136 MMHG | BODY MASS INDEX: 28.73 KG/M2 | HEIGHT: 58 IN | OXYGEN SATURATION: 97 % | WEIGHT: 136.88 LBS | DIASTOLIC BLOOD PRESSURE: 60 MMHG

## 2017-06-12 DIAGNOSIS — M54.42 ACUTE LEFT-SIDED LOW BACK PAIN WITH LEFT-SIDED SCIATICA: ICD-10-CM

## 2017-06-12 DIAGNOSIS — M54.42 ACUTE LEFT-SIDED LOW BACK PAIN WITH LEFT-SIDED SCIATICA: Primary | ICD-10-CM

## 2017-06-12 PROCEDURE — 72100 X-RAY EXAM L-S SPINE 2/3 VWS: CPT | Mod: 26,,, | Performed by: RADIOLOGY

## 2017-06-12 PROCEDURE — 99214 OFFICE O/P EST MOD 30 MIN: CPT | Mod: S$GLB,,, | Performed by: NURSE PRACTITIONER

## 2017-06-12 PROCEDURE — 99999 PR PBB SHADOW E&M-EST. PATIENT-LVL V: CPT | Mod: PBBFAC,,, | Performed by: NURSE PRACTITIONER

## 2017-06-12 PROCEDURE — 1159F MED LIST DOCD IN RCRD: CPT | Mod: S$GLB,,, | Performed by: NURSE PRACTITIONER

## 2017-06-12 PROCEDURE — 72100 X-RAY EXAM L-S SPINE 2/3 VWS: CPT | Mod: TC,PO

## 2017-06-12 RX ORDER — ACETAMINOPHEN AND CODEINE PHOSPHATE 300; 30 MG/1; MG/1
1 TABLET ORAL EVERY 6 HOURS PRN
Qty: 30 TABLET | Refills: 0 | Status: SHIPPED | OUTPATIENT
Start: 2017-06-12 | End: 2017-07-18 | Stop reason: ALTCHOICE

## 2017-06-12 RX ORDER — OMEPRAZOLE 40 MG/1
40 CAPSULE, DELAYED RELEASE ORAL DAILY
COMMUNITY
Start: 2017-06-09 | End: 2017-12-15 | Stop reason: SDUPTHER

## 2017-06-12 NOTE — TELEPHONE ENCOUNTER
Call patient.  X-ray rather unremarkable doesn't really give us any information.  I would like to do an MRI.  I have ordered that

## 2017-06-12 NOTE — PROGRESS NOTES
This dictation has been generated using Dragon Dictation some phonetic errors may occur.     Marina was seen today for back pain.    Diagnoses and all orders for this visit:    Acute left-sided low back pain with left-sided sciatica  -     X-Ray Lumbar Spine AP And Lateral; Future    Other orders  -     acetaminophen-codeine 300-30mg (TYLENOL #3) 300-30 mg Tab; Take 1 tablet by mouth every 6 (six) hours as needed.      Back pain with sciatica.  Concerning for compression fracture given remote history of falls and history of fractures.  Check x-ray as above also consider advancing degenerative disc disease.  I reviewed the images I'm concerned about loss of disc space at L4-L5 and L3-4.  Proceed with MRI.  I reviewed prior images of back from 2015 and compared them to today's x-rays.  Reviewed prior MRI report.  Return in about 2 weeks (around 6/26/2017).      ________________________________________________________________  ________________________________________________________________        Chief Complaint   Patient presents with    Back Pain     History of present illness  This 81 y.o. presents today for complaint of back and left leg pain.  Patient states symptoms started last week.  When asked how severe pain what she states that it's real bad more than a 10.  She can't sleep at night due to the pain.  She tried her usual Tylenol without control of her symptoms.  She has had trouble with various pain meds in the past but according to her and her  had Tylenol with codeine in the hospital and did okay with that.  No recent falls.  Patient does state she had a fall back in December.  She has had a history of osteoporosis and compression fractures in the back.  Review of systems  No fever or chills  Denies rash  No bowel or bladder habit changes  Past medical and social history reviewed    Past Medical History:   Diagnosis Date    Anemia     Aneurysm of aorta     Back pain     Cataract     Diabetes  mellitus     sees Endocrine yearly ??   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    Melanoma 2011    melanoma in situ of R nasal tip, excised with FTSG and treated with Aldara    MGUS (monoclonal gammopathy of unknown significance)     Dr Hodges    Osteopenia     Senile osteoporosis     Skin cancer     Skin neoplasm     Stroke     left weak -     Tongue cancer 2010    [id number]    Trouble in sleeping     Urinary incontinence     Vitamin B 12 deficiency     Vitamin B12 deficiency     Vitamin K deficiency        Past Surgical History:   Procedure Laterality Date    APPENDECTOMY      CATARACT EXTRACTION W/  INTRAOCULAR LENS IMPLANT Left 10/16/14    OS ()    CHOLECYSTECTOMY      COLECTOMY      eyelid surgery      HYSTERECTOMY      PARTIAL GLOSSECTOMY  2010    SELECTIVE NECK DISSECTION  2010    tonsilectomy      TONSILLECTOMY         Family History   Problem Relation Age of Onset    Coronary artery disease      COPD      Skin cancer Neg Hx     Melanoma Neg Hx        Social History     Social History    Marital status:      Spouse name: N/A    Number of children: N/A    Years of education: N/A     Social History Main Topics    Smoking status: Never Smoker    Smokeless tobacco: Never Used    Alcohol use No    Drug use: No    Sexual activity: No     Other Topics Concern    Are You Pregnant Or Think You May Be? No    Breast-Feeding No     Social History Narrative    She is . She lives on the [facility].       Current Outpatient  "Prescriptions   Medication Sig Dispense Refill    ACCU-CHEK FASTCLIX Misc USE TO CHECK BLOOD GLUCOSE THREE TIMES DAILY 1000 each 11    acetaminophen (TYLENOL) 500 MG tablet Take 1 tablet (500 mg total) by mouth every 4 (four) hours as needed for Pain.  0    amitriptyline (ELAVIL) 10 MG tablet Take 10 mg by mouth once daily.       amlodipine (NORVASC) 5 MG tablet Take 1 tablet (5 mg total) by mouth once daily. 90 tablet 4    aspirin (ECOTRIN) 81 MG EC tablet Take 81 mg by mouth once daily.      atorvastatin (LIPITOR) 40 MG tablet TAKE 1 TABLET ONE TIME DAILY 90 tablet 3    BD ULTRA-FINE BEVERLY PEN NEEDLES 32 gauge x 5/32" Ndle USE TWICE DAILY 500 each 12    blood sugar diagnostic (BLOOD GLUCOSE TEST) Strp Use to check blood glucose 3 times daily. Device is Accucheck. 100 each 11    blood-glucose meter Misc Use ad directed (Accu Check Beverly smartview meter) 1 each 4    clopidogrel (PLAVIX) 75 mg tablet TAKE 1 TABLET ONE TIME DAILY 90 tablet 3    cyanocobalamin 1,000 mcg/mL injection INJECT 1 ML UNDER THE SKIN EVERY 2 WEEKS (Patient taking differently: INJECT 1 ML UNDER THE SKIN EVERY 3 WEEKS) 6 mL 10    cycloSPORINE (RESTASIS) 0.05 % ophthalmic emulsion Place 0.4 mLs (1 drop total) into both eyes 2 (two) times daily. 180 vial 12    insulin aspart (NOVOLOG FLEXPEN) 100 unit/mL InPn pen Inject 4/6/6 units into skin with correction scale 150-200 +1, 201-250 +2, 251-300 +3, 301-350 +4, >350 +5. 6 mL 4    LEVEMIR FLEXTOUCH 100 unit/mL (3 mL) InPn pen INJECT 35 UNITS INTO THE SKIN EVERY EVENING. 30 mL 4    losartan (COZAAR) 50 MG tablet TAKE 1 TABLET ONE TIME DAILY 90 tablet 12    multivitamin (CHEWABLE MULTI VITAMIN) Chew Take 2 tablets by mouth once daily.       naproxen (NAPROSYN) 500 MG tablet Take 1 tablet (500 mg total) by mouth 2 (two) times daily with meals. 11 tablet 0    NOVOFINE 32 32 gauge x 1/4" Ndle USE WITH LEVEMIR PEN EVERY EVENING 100 each 3    pantoprazole (PROTONIX) 40 MG tablet Take 1 " tablet (40 mg total) by mouth once daily. 30 tablet 3    PROCTOZONE-HC 2.5 % rectal cream Place rectally 2 (two) times daily. 90 g 12    TRUEPLUS LANCETS 28 gauge Misc USE  TO CHECK BLOOD SUGAR THREE TIMES DAILY 300 each 0    UNABLE TO FIND Places uses Wellpatch daily      vitamin D 1000 units Tab Take 1 tablet (1,000 Units total) by mouth once daily.      VOLTAREN 1 % Gel       acetaminophen-codeine 300-30mg (TYLENOL #3) 300-30 mg Tab Take 1 tablet by mouth every 6 (six) hours as needed. 30 tablet 0    omeprazole (PRILOSEC) 40 MG capsule Take 40 mg by mouth once daily at 6am.      triamcinolone acetonide 0.1% (KENALOG) 0.1 % cream Apply topically 2 (two) times daily. 45 g 3     No current facility-administered medications for this visit.        Review of patient's allergies indicates:   Allergen Reactions    Baclofen Nausea And Vomiting and Other (See Comments)     Dizziness    Benazepril      Other reaction(s): Rash,Swelling    Carisoprodol      Other reaction(s): Dysphoria    Desloratadine      Other reaction(s): Nausea  Other reaction(s): Nausea    Hydrocodone      Other reaction(s): disorientation    Methocarbamol      Other reaction(s): Nausea    Omeprazole Nausea And Vomiting    Oxybutynin      Other reaction(s): Hallucinations    Oxycodone Nausea And Vomiting    Tramadol      Other reaction(s): dizziness    Benazepril-hydrochlorothiazide Rash     Other reaction(s): Swelling    Metformin Diarrhea    Prednisone Rash       Physical examination  Vitals Reviewed  Gen. Well-dressed well-nourished   Skin warm dry and intact.  No rashes noted.  Neck is supple without adenopathy  Chest.  Respirations are even unlabored.  Lungs are clear to auscultation.  Cardiac regular rate and rhythm.  No chest wall adenopathy noted.  Neuro. Awake alert oriented x4.  Normal judgment and cognition noted.  Left straight leg raise positive on the left.  Extremities no clubbing cyanosis or edema noted.  Difficulty  transferring to exam table due to pain but able to do so without assistance.  She does require pillows to keep her in a propped up position unable to lay down flat due to kyphosis.      Call or return to clinic prn if these symptoms worsen or fail to improve as anticipated.

## 2017-06-15 ENCOUNTER — OFFICE VISIT (OUTPATIENT)
Dept: PODIATRY | Facility: CLINIC | Age: 82
End: 2017-06-15
Payer: MEDICARE

## 2017-06-15 VITALS
DIASTOLIC BLOOD PRESSURE: 74 MMHG | WEIGHT: 136 LBS | BODY MASS INDEX: 28.55 KG/M2 | SYSTOLIC BLOOD PRESSURE: 130 MMHG | HEIGHT: 58 IN

## 2017-06-15 DIAGNOSIS — B35.1 NAIL DERMATOPHYTOSIS: ICD-10-CM

## 2017-06-15 DIAGNOSIS — L90.9 FAT PAD ATROPHY OF FOOT: ICD-10-CM

## 2017-06-15 DIAGNOSIS — D51.0 PERNICIOUS ANEMIA: ICD-10-CM

## 2017-06-15 DIAGNOSIS — E11.49 TYPE II DIABETES MELLITUS WITH NEUROLOGICAL MANIFESTATIONS: Primary | ICD-10-CM

## 2017-06-15 PROCEDURE — 99499 UNLISTED E&M SERVICE: CPT | Mod: S$GLB,,, | Performed by: PODIATRIST

## 2017-06-15 PROCEDURE — 99999 PR PBB SHADOW E&M-EST. PATIENT-LVL III: CPT | Mod: PBBFAC,,, | Performed by: PODIATRIST

## 2017-06-15 PROCEDURE — 11721 DEBRIDE NAIL 6 OR MORE: CPT | Mod: Q9,S$GLB,, | Performed by: PODIATRIST

## 2017-06-15 NOTE — PROGRESS NOTES
Subjective:      Patient ID: Marina Trimble is a 81 y.o. female.    Chief Complaint: No chief complaint on file.    Marina is a 81 y.o. female who presents to the clinic for evaluation and treatment of high risk feet. 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 The patient's chief complaint is long, thick toenails. This patient has documented high risk feet requiring routine maintenance secondary to diabetes mellitis and those secondary complications of diabetes, as mentioned. She has the one type of anemia; pernious 281.0 that still qualifies for nail care.    PCP: Jose Jimenez MD    Date Last Seen by PCP:   No chief complaint on file.      Current shoe gear:  SAS shoes    Hemoglobin A1C   Date Value Ref Range Status   12/23/2016 9.2 (H) 4.5 - 6.2 % Final     Comment:     According to ADA guidelines, hemoglobin A1C <7.0% represents  optimal control in non-pregnant diabetic patients.  Different  metrics may apply to specific populations.   Standards of Medical Care in Diabetes - 2016.  For the purpose of screening for the presence of diabetes:  <5.7%     Consistent with the absence of diabetes  5.7-6.4%  Consistent with  "increasing risk for diabetes   (prediabetes)  >or=6.5%  Consistent with diabetes  Currently no consensus exists for use of hemoglobin A1C  for diagnosis of diabetes for children.     08/25/2016 9.2 (H) 4.5 - 6.2 % Final     Comment:     According to ADA guidelines, hemoglobin A1C <7.0% represents  optimal control in non-pregnant diabetic patients.  Different  metrics may apply to specific populations.   Standards of Medical Care in Diabetes - 2016.  For the purpose of screening for the presence of diabetes:  <5.7%     Consistent with the absence of diabetes  5.7-6.4%  Consistent with increasing risk for diabetes   (prediabetes)  >or=6.5%  Consistent with diabetes  Currently no consensus exists for use of hemoglobin A1C  for diagnosis of diabetes for children.     03/07/2016 8.4 (H) 4.5 - 6.2 % Final     Current Outpatient Prescriptions on File Prior to Visit   Medication Sig Dispense Refill    ACCU-CHEK FASTCLIX Misc USE TO CHECK BLOOD GLUCOSE THREE TIMES DAILY 1000 each 11    acetaminophen (TYLENOL) 500 MG tablet Take 1 tablet (500 mg total) by mouth every 4 (four) hours as needed for Pain.  0    acetaminophen-codeine 300-30mg (TYLENOL #3) 300-30 mg Tab Take 1 tablet by mouth every 6 (six) hours as needed. 30 tablet 0    amitriptyline (ELAVIL) 10 MG tablet Take 10 mg by mouth once daily.       amlodipine (NORVASC) 5 MG tablet Take 1 tablet (5 mg total) by mouth once daily. 90 tablet 4    aspirin (ECOTRIN) 81 MG EC tablet Take 81 mg by mouth once daily.      atorvastatin (LIPITOR) 40 MG tablet TAKE 1 TABLET ONE TIME DAILY 90 tablet 3    BD ULTRA-FINE BEVERLY PEN NEEDLES 32 gauge x 5/32" Ndle USE TWICE DAILY 500 each 12    blood sugar diagnostic (BLOOD GLUCOSE TEST) Strp Use to check blood glucose 3 times daily. Device is Accucheck. 100 each 11    blood-glucose meter Misc Use ad directed (Accu Check [place] smartview meter) 1 each 4    clopidogrel (PLAVIX) 75 mg tablet TAKE 1 TABLET ONE TIME DAILY 90 tablet 3 " "   cyanocobalamin 1,000 mcg/mL injection INJECT 1 ML UNDER THE SKIN EVERY 2 WEEKS (Patient taking differently: INJECT 1 ML UNDER THE SKIN EVERY 3 WEEKS) 6 mL 10    cycloSPORINE (RESTASIS) 0.05 % ophthalmic emulsion Place 0.4 mLs (1 drop total) into both eyes 2 (two) times daily. 180 vial 12    insulin aspart (NOVOLOG FLEXPEN) 100 unit/mL InPn pen Inject 4/6/6 units into skin with correction scale 150-200 +1, 201-250 +2, 251-300 +3, 301-350 +4, >350 +5. 6 mL 4    LEVEMIR FLEXTOUCH 100 unit/mL (3 mL) InPn pen INJECT 35 UNITS INTO THE SKIN EVERY EVENING. 30 mL 4    losartan (COZAAR) 50 MG tablet TAKE 1 TABLET ONE TIME DAILY 90 tablet 12    multivitamin (CHEWABLE MULTI VITAMIN) Chew Take 2 tablets by mouth once daily.       naproxen (NAPROSYN) 500 MG tablet Take 1 tablet (500 mg total) by mouth 2 (two) times daily with meals. 11 tablet 0    NOVOFINE 32 32 gauge x 1/4" Ndle USE WITH LEVEMIR PEN EVERY EVENING 100 each 3    omeprazole (PRILOSEC) 40 MG capsule Take 40 mg by mouth once daily at 6am.      pantoprazole (PROTONIX) 40 MG tablet Take 1 tablet (40 mg total) by mouth once daily. 30 tablet 3    PROCTOZONE-HC 2.5 % rectal cream Place rectally 2 (two) times daily. 90 g 12    triamcinolone acetonide 0.1% (KENALOG) 0.1 % cream Apply topically 2 (two) times daily. 45 g 3    TRUEPLUS LANCETS 28 gauge Misc USE  TO CHECK BLOOD SUGAR THREE TIMES DAILY 300 each 0    UNABLE TO FIND Places uses Wellpatch daily      vitamin D 1000 units Tab Take 1 tablet (1,000 Units total) by mouth once daily.      VOLTAREN 1 % Gel        No current facility-administered medications on file prior to visit.      Review of patient's allergies indicates:   Allergen Reactions    Baclofen Nausea And Vomiting and Other (See Comments)     Dizziness    Benazepril      Other reaction(s): Rash,Swelling    Carisoprodol      Other reaction(s): Dysphoria    Desloratadine      Other reaction(s): Nausea  Other reaction(s): Nausea    " Hydrocodone      Other reaction(s): disorientation    Methocarbamol      Other reaction(s): Nausea    Omeprazole Nausea And Vomiting    Oxybutynin      Other reaction(s): Hallucinations    Oxycodone Nausea And Vomiting    Tramadol      Other reaction(s): dizziness    Benazepril-hydrochlorothiazide Rash     Other reaction(s): Swelling    Metformin Diarrhea    Prednisone Rash     Past Surgical History:   Procedure Laterality Date    APPENDECTOMY      CATARACT EXTRACTION W/  INTRAOCULAR LENS IMPLANT Left 10/16/14    OS ()    CHOLECYSTECTOMY      COLECTOMY      eyelid surgery      HYSTERECTOMY      PARTIAL GLOSSECTOMY  2010    SELECTIVE NECK DISSECTION  2010    tonsilectomy      TONSILLECTOMY       Family History   Problem Relation Age of Onset    Coronary artery disease      COPD      Skin cancer Neg Hx     Melanoma Neg Hx      Social History     Social History    Marital status:      Spouse name: N/A    Number of children: N/A    Years of education: N/A     Occupational History    Not on file.     Social History Main Topics    Smoking status: Never Smoker    Smokeless tobacco: Never Used    Alcohol use No    Drug use: No    Sexual activity: No     Other Topics Concern    Are You Pregnant Or Think You May Be? No    Breast-Feeding No     Social History Narrative    She is . She lives on the [place] [employer] [employer].     Review of Systems   Constitution: Negative for chills, fever and weakness.   Cardiovascular: Positive for leg swelling. Negative for claudication.   Respiratory: Positive for shortness of breath. Negative for cough.    Skin: Positive for dry skin and nail changes. Negative for itching and rash.   Musculoskeletal: Positive for arthritis, back pain, falls (december 2016, broke her hip), gout and joint pain. Negative for joint swelling and muscle weakness.   Gastrointestinal: Negative for diarrhea, nausea and vomiting.   Neurological: Positive for paresthesias.  Negative for numbness and tremors.   Psychiatric/Behavioral: Negative for altered mental status and hallucinations.         Objective:       There were no vitals filed for this visit.    Physical Exam   Constitutional:   General: Pt. is well-developed, well-nourished, appears stated age, in no acute distress, alert and oriented x 3. No evidence of depression, anxiety, or agitation. Calm, cooperative, and communicative. Appropriate interactions and affect.       Cardiovascular:   Pulses:       Dorsalis pedis pulses are 1+ on the right side, and 1+ on the left side.        Posterior tibial pulses are 1+ on the right side, and 1+ on the left side.   Dorsalis pedis and posterior tibial pulses are diminished Bilaterally. Toes are cool to touch. Feet are warm proximally.There is decreased digital hair. Skin is atrophic   Musculoskeletal:        Right ankle: She exhibits swelling. She exhibits normal range of motion. No tenderness. Achilles tendon exhibits no pain and no defect.        Left ankle: She exhibits swelling. She exhibits normal range of motion. No tenderness. Achilles tendon exhibits no pain and no defect.        Right foot: There is normal range of motion and no tenderness.        Left foot: There is normal range of motion and no tenderness.   Decreased stride, station of gait.  apropulsive toe off.  Increased angle and base of gait.    Patient has hammertoes of digits 2-5 bilateral partially reducible without symptom today.    Visible and palpable bunion without pain at dorsomedial 1st metatarsal head right and left.  Hallux abducted right and left partially reducible, tracks laterally without being track bound.  No ecchymosis, erythema, edema, or cardinal signs infection or signs of trauma same foot.    Fat pad atrophy to heels and met heads bilateral       Neurological: No sensory deficit.   [place] 5.07 monofilament is intact bilateral feet. Sharp/dull sensation is also intact Bilateral feet.        Skin: Skin is warm, dry and intact. No abrasion, no ecchymosis, no lesion and no rash noted. She is not diaphoretic. No cyanosis or erythema. No pallor. Nails show no clubbing.   Skin is atrophic, mild plantar rubor.  No pallor.      No pedal hair noted    Toenails 1-5 bilaterally are elongated by 2-3 mm, thickened by 2-3 mm, discolored/yellowed, dystrophic, brittle with subungual debris.     granuloma noted to lateral right hallux nail border;  Tender to palpation     Interdigital Spaces clean, dry and without evidence of break in skin integrity   Psychiatric: She has a normal mood and affect. Her speech is normal.   Nursing note and vitals reviewed.        Assessment:       Encounter Diagnoses   Name Primary?    Type II diabetes mellitus with neurological manifestations Yes    Pernicious anemia     Fat pad atrophy of foot     Nail dermatophytosis          Plan:       Diagnoses and all orders for this visit:    Type II diabetes mellitus with neurological manifestations    Pernicious anemia    Fat pad atrophy of foot    Nail dermatophytosis      I counseled the patient on her conditions, their implications and medical management.    - Shoe inspection. Diabetic Foot Education. Patient reminded of the importance of good nutrition and blood sugar control to help prevent podiatric complications of diabetes. Patient instructed on proper foot hygeine. We discussed wearing proper shoe gear, daily foot inspections, never walking without protective shoe gear, never putting sharp instruments to feet.     - With patient's permission, nails were aggressively reduced and debrided x 10 to their soft tissue attachment mechanically and with electric , removing all offending nail and debris. Patient relates relief following the procedure.  She will continue to monitor the areas daily, inspect her feet, wear protective shoe gear when ambulatory, moisturizer to maintain skin integrity and follow in this office in  approximately 3-4 months, sooner p.r.n.

## 2017-06-19 ENCOUNTER — TELEPHONE (OUTPATIENT)
Dept: FAMILY MEDICINE | Facility: CLINIC | Age: 82
End: 2017-06-19

## 2017-06-19 DIAGNOSIS — M54.42 LOW BACK PAIN WITH LEFT-SIDED SCIATICA, UNSPECIFIED BACK PAIN LATERALITY, UNSPECIFIED CHRONICITY: Primary | ICD-10-CM

## 2017-06-19 DIAGNOSIS — Z87.81 HISTORY OF VERTEBRAL COMPRESSION FRACTURE: ICD-10-CM

## 2017-06-19 NOTE — TELEPHONE ENCOUNTER
----- Message from Shana Sesay sent at 6/19/2017 11:01 AM CDT -----  Contact: self  Pt calling to discuss a personal matter. Please call [phone]

## 2017-06-19 NOTE — TELEPHONE ENCOUNTER
Was seen by FELY On 6/12/17 for Acute left-sided low back pain and left sided sciatica. Was told  X-ray rather unremarkable doesn't really give us any information.   would like to do an MRI.  MRI ordered. Stated, she has phobia about being closed in. Unable to do. Can any other test be done. She is declining MRI.

## 2017-06-20 NOTE — TELEPHONE ENCOUNTER
A CT but it is not as good a view.  Does she want meds to take to do the mri or does she want the CT?

## 2017-06-27 ENCOUNTER — TELEPHONE (OUTPATIENT)
Dept: FAMILY MEDICINE | Facility: CLINIC | Age: 82
End: 2017-06-27

## 2017-06-27 NOTE — TELEPHONE ENCOUNTER
----- Message from Shana Sesay sent at 6/27/2017 11:31 AM CDT -----  Contact: self  Pt calling to discuss not being able to take MRI. Please call [phone].

## 2017-06-30 ENCOUNTER — TELEPHONE (OUTPATIENT)
Dept: FAMILY MEDICINE | Facility: CLINIC | Age: 82
End: 2017-06-30

## 2017-06-30 ENCOUNTER — HOSPITAL ENCOUNTER (OUTPATIENT)
Dept: RADIOLOGY | Facility: HOSPITAL | Age: 82
Discharge: HOME | End: 2017-06-30
Attending: NURSE PRACTITIONER
Payer: MEDICARE

## 2017-06-30 DIAGNOSIS — M54.42 LOW BACK PAIN WITH LEFT-SIDED SCIATICA, UNSPECIFIED BACK PAIN LATERALITY, UNSPECIFIED CHRONICITY: ICD-10-CM

## 2017-06-30 DIAGNOSIS — Z87.81 HISTORY OF VERTEBRAL COMPRESSION FRACTURE: ICD-10-CM

## 2017-06-30 PROCEDURE — 72131 CT LUMBAR SPINE W/O DYE: CPT | Mod: 26,,, | Performed by: RADIOLOGY

## 2017-06-30 PROCEDURE — 72131 CT LUMBAR SPINE W/O DYE: CPT | Mod: TC

## 2017-06-30 NOTE — TELEPHONE ENCOUNTER
Call pt. No new compression fractures noted. She has scoliosis. We can refer her to pain management for help.

## 2017-07-03 NOTE — TELEPHONE ENCOUNTER
Called patient about test results. She verbally understood. She does not want to go back to pain management it did not hel. She took injections. Please advise

## 2017-07-03 NOTE — TELEPHONE ENCOUNTER
Spoke to patient and her  about her test results. He verbally understood I also told him I was sending you the message

## 2017-07-05 ENCOUNTER — TELEPHONE (OUTPATIENT)
Dept: FAMILY MEDICINE | Facility: CLINIC | Age: 82
End: 2017-07-05

## 2017-07-05 NOTE — TELEPHONE ENCOUNTER
----- Message from Angela Mcknight sent at 7/5/2017  4:06 PM CDT -----  Contact: Self  Pt calling to speak to a nurse regarding diabetic supplies. Please call [phone].

## 2017-07-06 DIAGNOSIS — E11.9 TYPE 2 DIABETES MELLITUS WITHOUT COMPLICATION: ICD-10-CM

## 2017-07-09 ENCOUNTER — HOSPITAL ENCOUNTER (OUTPATIENT)
Facility: HOSPITAL | Age: 82
Discharge: HOME | End: 2017-07-12
Attending: EMERGENCY MEDICINE | Admitting: INTERNAL MEDICINE
Payer: MEDICARE

## 2017-07-09 DIAGNOSIS — E78.5 HYPERLIPIDEMIA, UNSPECIFIED HYPERLIPIDEMIA TYPE: ICD-10-CM

## 2017-07-09 DIAGNOSIS — D64.9 ANEMIA, UNSPECIFIED TYPE: ICD-10-CM

## 2017-07-09 DIAGNOSIS — Z86.73 HISTORY OF CVA (CEREBROVASCULAR ACCIDENT): ICD-10-CM

## 2017-07-09 DIAGNOSIS — K92.0 GASTROINTESTINAL HEMORRHAGE WITH HEMATEMESIS: Primary | ICD-10-CM

## 2017-07-09 DIAGNOSIS — K92.2 GASTROINTESTINAL HEMORRHAGE: ICD-10-CM

## 2017-07-09 DIAGNOSIS — Z85.810 HISTORY OF TONGUE CANCER: ICD-10-CM

## 2017-07-09 DIAGNOSIS — I10 ESSENTIAL HYPERTENSION: ICD-10-CM

## 2017-07-09 DIAGNOSIS — M54.6 CHRONIC THORACIC BACK PAIN, UNSPECIFIED BACK PAIN LATERALITY: ICD-10-CM

## 2017-07-09 DIAGNOSIS — R07.9 CHEST PAIN: ICD-10-CM

## 2017-07-09 DIAGNOSIS — G89.29 CHRONIC THORACIC BACK PAIN, UNSPECIFIED BACK PAIN LATERALITY: ICD-10-CM

## 2017-07-09 DIAGNOSIS — K92.2 GASTROINTESTINAL HEMORRHAGE, UNSPECIFIED GASTROINTESTINAL HEMORRHAGE TYPE: ICD-10-CM

## 2017-07-09 LAB
ABO + RH BLD: NORMAL
ALBUMIN SERPL BCP-MCNC: 3.5 G/DL
ALP SERPL-CCNC: 67 U/L
ALT SERPL W/O P-5'-P-CCNC: 13 U/L
ANION GAP SERPL CALC-SCNC: 10 MMOL/L
APTT BLDCRRT: <21 SEC
AST SERPL-CCNC: 24 U/L
BASOPHILS # BLD AUTO: 0.03 K/UL
BASOPHILS NFR BLD: 0.3 %
BILIRUB SERPL-MCNC: 0.4 MG/DL
BILIRUB UR QL STRIP: NEGATIVE
BLD GP AB SCN CELLS X3 SERPL QL: NORMAL
BUN SERPL-MCNC: 23 MG/DL
CALCIUM SERPL-MCNC: 10.1 MG/DL
CHLORIDE SERPL-SCNC: 102 MMOL/L
CLARITY UR REFRACT.AUTO: CLEAR
CO2 SERPL-SCNC: 28 MMOL/L
COLOR UR AUTO: YELLOW
CREAT SERPL-MCNC: 0.9 MG/DL
DIFFERENTIAL METHOD: ABNORMAL
EOSINOPHIL # BLD AUTO: 0.1 K/UL
EOSINOPHIL NFR BLD: 1 %
ERYTHROCYTE [DISTWIDTH] IN BLOOD BY AUTOMATED COUNT: 14.8 %
EST. GFR  (AFRICAN AMERICAN): >60 ML/MIN/1.73 M^2
EST. GFR  (NON AFRICAN AMERICAN): >60 ML/MIN/1.73 M^2
ESTIMATED AVG GLUCOSE: 186 MG/DL
GLUCOSE SERPL-MCNC: 148 MG/DL
GLUCOSE UR QL STRIP: NEGATIVE
HBA1C MFR BLD HPLC: 8.1 %
HCT VFR BLD AUTO: 32.9 %
HGB BLD-MCNC: 10.2 G/DL
HGB UR QL STRIP: NEGATIVE
INR PPP: 1
KETONES UR QL STRIP: NEGATIVE
LACTATE SERPL-SCNC: 1 MMOL/L
LEUKOCYTE ESTERASE UR QL STRIP: NEGATIVE
LYMPHOCYTES # BLD AUTO: 1 K/UL
LYMPHOCYTES NFR BLD: 9.2 %
MCH RBC QN AUTO: 25.4 PG
MCHC RBC AUTO-ENTMCNC: 31 %
MCV RBC AUTO: 82 FL
MONOCYTES # BLD AUTO: 0.6 K/UL
MONOCYTES NFR BLD: 5.5 %
NEUTROPHILS # BLD AUTO: 9.2 K/UL
NEUTROPHILS NFR BLD: 83.8 %
NITRITE UR QL STRIP: NEGATIVE
PH UR STRIP: 7 [PH] (ref 5–8)
PLATELET # BLD AUTO: 348 K/UL
PMV BLD AUTO: 10.1 FL
POCT GLUCOSE: 123 MG/DL (ref 70–110)
POCT GLUCOSE: 173 MG/DL (ref 70–110)
POCT GLUCOSE: 218 MG/DL (ref 70–110)
POTASSIUM SERPL-SCNC: 4.2 MMOL/L
PROT SERPL-MCNC: 7.1 G/DL
PROT UR QL STRIP: NEGATIVE
PROTHROMBIN TIME: 10.8 SEC
RBC # BLD AUTO: 4.02 M/UL
SODIUM SERPL-SCNC: 140 MMOL/L
SP GR UR STRIP: 1 (ref 1–1.03)
TROPONIN I SERPL DL<=0.01 NG/ML-MCNC: 0.01 NG/ML
TROPONIN I SERPL DL<=0.01 NG/ML-MCNC: 0.02 NG/ML
URN SPEC COLLECT METH UR: NORMAL
UROBILINOGEN UR STRIP-ACNC: NEGATIVE EU/DL
WBC # BLD AUTO: 11 K/UL

## 2017-07-09 PROCEDURE — G0378 HOSPITAL OBSERVATION PER HR: HCPCS

## 2017-07-09 PROCEDURE — 85610 PROTHROMBIN TIME: CPT

## 2017-07-09 PROCEDURE — 84484 ASSAY OF TROPONIN QUANT: CPT | Mod: 91

## 2017-07-09 PROCEDURE — 85025 COMPLETE CBC W/AUTO DIFF WBC: CPT

## 2017-07-09 PROCEDURE — 63600175 PHARM REV CODE 636 W HCPCS: Performed by: PHYSICIAN ASSISTANT

## 2017-07-09 PROCEDURE — 83036 HEMOGLOBIN GLYCOSYLATED A1C: CPT

## 2017-07-09 PROCEDURE — 96374 THER/PROPH/DIAG INJ IV PUSH: CPT

## 2017-07-09 PROCEDURE — 93005 ELECTROCARDIOGRAM TRACING: CPT

## 2017-07-09 PROCEDURE — 93010 ELECTROCARDIOGRAM REPORT: CPT | Mod: ,,, | Performed by: INTERNAL MEDICINE

## 2017-07-09 PROCEDURE — 96361 HYDRATE IV INFUSION ADD-ON: CPT

## 2017-07-09 PROCEDURE — 86901 BLOOD TYPING SEROLOGIC RH(D): CPT

## 2017-07-09 PROCEDURE — 99285 EMERGENCY DEPT VISIT HI MDM: CPT

## 2017-07-09 PROCEDURE — 85730 THROMBOPLASTIN TIME PARTIAL: CPT

## 2017-07-09 PROCEDURE — 25000003 PHARM REV CODE 250: Performed by: PHYSICIAN ASSISTANT

## 2017-07-09 PROCEDURE — 86900 BLOOD TYPING SEROLOGIC ABO: CPT

## 2017-07-09 PROCEDURE — 25000003 PHARM REV CODE 250: Performed by: STUDENT IN AN ORGANIZED HEALTH CARE EDUCATION/TRAINING PROGRAM

## 2017-07-09 PROCEDURE — 83605 ASSAY OF LACTIC ACID: CPT

## 2017-07-09 PROCEDURE — 36415 COLL VENOUS BLD VENIPUNCTURE: CPT

## 2017-07-09 PROCEDURE — C9113 INJ PANTOPRAZOLE SODIUM, VIA: HCPCS | Performed by: STUDENT IN AN ORGANIZED HEALTH CARE EDUCATION/TRAINING PROGRAM

## 2017-07-09 PROCEDURE — 63600175 PHARM REV CODE 636 W HCPCS: Performed by: STUDENT IN AN ORGANIZED HEALTH CARE EDUCATION/TRAINING PROGRAM

## 2017-07-09 PROCEDURE — 99219 PR INITIAL OBSERVATION CARE,LEVL II: CPT | Mod: ,,, | Performed by: PHYSICIAN ASSISTANT

## 2017-07-09 PROCEDURE — 99285 EMERGENCY DEPT VISIT HI MDM: CPT | Mod: ,,, | Performed by: EMERGENCY MEDICINE

## 2017-07-09 PROCEDURE — 80053 COMPREHEN METABOLIC PANEL: CPT

## 2017-07-09 PROCEDURE — 81003 URINALYSIS AUTO W/O SCOPE: CPT

## 2017-07-09 RX ORDER — IBUPROFEN 200 MG
24 TABLET ORAL
Status: DISCONTINUED | OUTPATIENT
Start: 2017-07-09 | End: 2017-07-12 | Stop reason: HOSPADM

## 2017-07-09 RX ORDER — ONDANSETRON 8 MG/1
8 TABLET, ORALLY DISINTEGRATING ORAL EVERY 8 HOURS PRN
Status: DISCONTINUED | OUTPATIENT
Start: 2017-07-09 | End: 2017-07-12 | Stop reason: HOSPADM

## 2017-07-09 RX ORDER — ASPIRIN 81 MG/1
81 TABLET ORAL DAILY
Status: DISCONTINUED | OUTPATIENT
Start: 2017-07-09 | End: 2017-07-09

## 2017-07-09 RX ORDER — CLOPIDOGREL BISULFATE 75 MG/1
75 TABLET ORAL DAILY
Status: DISCONTINUED | OUTPATIENT
Start: 2017-07-09 | End: 2017-07-09

## 2017-07-09 RX ORDER — IBUPROFEN 200 MG
16 TABLET ORAL
Status: DISCONTINUED | OUTPATIENT
Start: 2017-07-09 | End: 2017-07-12 | Stop reason: HOSPADM

## 2017-07-09 RX ORDER — LOSARTAN POTASSIUM 50 MG/1
50 TABLET ORAL DAILY
Status: DISCONTINUED | OUTPATIENT
Start: 2017-07-09 | End: 2017-07-12 | Stop reason: HOSPADM

## 2017-07-09 RX ORDER — ACETAMINOPHEN 325 MG/1
650 TABLET ORAL EVERY 6 HOURS PRN
Status: DISCONTINUED | OUTPATIENT
Start: 2017-07-09 | End: 2017-07-12 | Stop reason: HOSPADM

## 2017-07-09 RX ORDER — NAPROXEN 250 MG/1
500 TABLET ORAL 2 TIMES DAILY WITH MEALS
Status: DISCONTINUED | OUTPATIENT
Start: 2017-07-09 | End: 2017-07-10

## 2017-07-09 RX ORDER — PANTOPRAZOLE SODIUM 40 MG/10ML
80 INJECTION, POWDER, LYOPHILIZED, FOR SOLUTION INTRAVENOUS ONCE
Status: COMPLETED | OUTPATIENT
Start: 2017-07-09 | End: 2017-07-09

## 2017-07-09 RX ORDER — PANTOPRAZOLE SODIUM 40 MG/10ML
40 INJECTION, POWDER, LYOPHILIZED, FOR SOLUTION INTRAVENOUS 2 TIMES DAILY
Status: DISCONTINUED | OUTPATIENT
Start: 2017-07-09 | End: 2017-07-09

## 2017-07-09 RX ORDER — AMLODIPINE BESYLATE 5 MG/1
5 TABLET ORAL DAILY
Status: DISCONTINUED | OUTPATIENT
Start: 2017-07-09 | End: 2017-07-12 | Stop reason: HOSPADM

## 2017-07-09 RX ORDER — ACETAMINOPHEN 325 MG/1
650 TABLET ORAL EVERY 6 HOURS PRN
Status: DISCONTINUED | OUTPATIENT
Start: 2017-07-09 | End: 2017-07-09

## 2017-07-09 RX ORDER — ONDANSETRON 2 MG/ML
4 INJECTION INTRAMUSCULAR; INTRAVENOUS EVERY 8 HOURS PRN
Status: DISCONTINUED | OUTPATIENT
Start: 2017-07-09 | End: 2017-07-12 | Stop reason: HOSPADM

## 2017-07-09 RX ORDER — GLUCAGON 1 MG
1 KIT INJECTION
Status: DISCONTINUED | OUTPATIENT
Start: 2017-07-09 | End: 2017-07-12 | Stop reason: HOSPADM

## 2017-07-09 RX ORDER — PANTOPRAZOLE SODIUM 40 MG/1
40 TABLET, DELAYED RELEASE ORAL DAILY
Status: DISCONTINUED | OUTPATIENT
Start: 2017-07-10 | End: 2017-07-10

## 2017-07-09 RX ORDER — INSULIN ASPART 100 [IU]/ML
0-5 INJECTION, SOLUTION INTRAVENOUS; SUBCUTANEOUS
Status: DISCONTINUED | OUTPATIENT
Start: 2017-07-09 | End: 2017-07-12 | Stop reason: HOSPADM

## 2017-07-09 RX ORDER — ATORVASTATIN CALCIUM 20 MG/1
40 TABLET, FILM COATED ORAL DAILY
Status: DISCONTINUED | OUTPATIENT
Start: 2017-07-09 | End: 2017-07-12 | Stop reason: HOSPADM

## 2017-07-09 RX ORDER — CHOLECALCIFEROL (VITAMIN D3) 25 MCG
1000 TABLET ORAL DAILY
Status: DISCONTINUED | OUTPATIENT
Start: 2017-07-09 | End: 2017-07-12 | Stop reason: HOSPADM

## 2017-07-09 RX ORDER — ACETAMINOPHEN AND CODEINE PHOSPHATE 300; 30 MG/1; MG/1
1 TABLET ORAL EVERY 6 HOURS PRN
Status: DISCONTINUED | OUTPATIENT
Start: 2017-07-09 | End: 2017-07-12 | Stop reason: HOSPADM

## 2017-07-09 RX ORDER — SODIUM CHLORIDE 0.9 % (FLUSH) 0.9 %
3 SYRINGE (ML) INJECTION EVERY 8 HOURS
Status: DISCONTINUED | OUTPATIENT
Start: 2017-07-09 | End: 2017-07-12 | Stop reason: HOSPADM

## 2017-07-09 RX ADMIN — Medication 3 ML: at 10:07

## 2017-07-09 RX ADMIN — ATORVASTATIN CALCIUM 40 MG: 20 TABLET, FILM COATED ORAL at 03:07

## 2017-07-09 RX ADMIN — PANTOPRAZOLE SODIUM 80 MG: 40 INJECTION, POWDER, FOR SOLUTION INTRAVENOUS at 11:07

## 2017-07-09 RX ADMIN — VITAMIN D, TAB 1000IU (100/BT) 1000 UNITS: 25 TAB at 03:07

## 2017-07-09 RX ADMIN — Medication 3 ML: at 03:07

## 2017-07-09 RX ADMIN — AMLODIPINE BESYLATE 5 MG: 5 TABLET ORAL at 03:07

## 2017-07-09 RX ADMIN — NAPROXEN 500 MG: 250 TABLET ORAL at 06:07

## 2017-07-09 RX ADMIN — LOSARTAN POTASSIUM 50 MG: 50 TABLET, FILM COATED ORAL at 03:07

## 2017-07-09 RX ADMIN — SODIUM CHLORIDE, SODIUM LACTATE, POTASSIUM CHLORIDE, AND CALCIUM CHLORIDE 1000 ML: .6; .31; .03; .02 INJECTION, SOLUTION INTRAVENOUS at 12:07

## 2017-07-09 RX ADMIN — INSULIN DETEMIR 14 UNITS: 100 INJECTION, SOLUTION SUBCUTANEOUS at 08:07

## 2017-07-09 NOTE — SUBJECTIVE & OBJECTIVE
Past Medical History:   Diagnosis Date    Anemia     Aneurysm of aorta     Back pain     Cataract     Diabetes mellitus     sees Endocrine yearly ??   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    Melanoma 2011    melanoma in situ of R nasal tip, excised with FTSG and treated with Aldara    MGUS (monoclonal gammopathy of unknown significance)     Dr Hodges    Osteopenia     Senile osteoporosis     Skin cancer     Skin neoplasm     Stroke     left weak -     Tongue cancer 2010    [id number]    Trouble in sleeping     Urinary incontinence     Vitamin B 12 deficiency     Vitamin B12 deficiency     Vitamin K deficiency        Past Surgical History:   Procedure Laterality Date    APPENDECTOMY      CATARACT EXTRACTION W/  INTRAOCULAR LENS IMPLANT Left 10/16/14    OS ()    CHOLECYSTECTOMY      COLECTOMY      eyelid surgery      HYSTERECTOMY      PARTIAL GLOSSECTOMY  2010    SELECTIVE NECK DISSECTION  2010    tonsilectomy      TONSILLECTOMY         Review of patient's allergies indicates:   Allergen Reactions    Baclofen Nausea And Vomiting and Other (See Comments)     Dizziness    Benazepril      Other reaction(s): Rash,Swelling    Carisoprodol      Other reaction(s): Dysphoria    Desloratadine      Other reaction(s): Nausea  Other reaction(s): Nausea    Hydrocodone      Other reaction(s): disorientation    Methocarbamol      Other reaction(s): Nausea    Omeprazole Nausea And Vomiting    Oxybutynin      Other reaction(s): Hallucinations    Oxycodone  "Nausea And Vomiting    Tramadol      Other reaction(s): dizziness    Benazepril-hydrochlorothiazide Rash     Other reaction(s): Swelling    Metformin Diarrhea    Prednisone Rash       No current facility-administered medications on file prior to encounter.      Current Outpatient Prescriptions on File Prior to Encounter   Medication Sig    ACCU-CHEK FASTCLIX Misc USE TO CHECK BLOOD GLUCOSE THREE TIMES DAILY    acetaminophen-codeine 300-30mg (TYLENOL #3) 300-30 mg Tab Take 1 tablet by mouth every 6 (six) hours as needed.    amlodipine (NORVASC) 5 MG tablet Take 1 tablet (5 mg total) by mouth once daily.    aspirin (ECOTRIN) 81 MG EC tablet Take 81 mg by mouth once daily.    atorvastatin (LIPITOR) 40 MG tablet TAKE 1 TABLET ONE TIME DAILY    BD ULTRA-FINE BEVERLY PEN NEEDLES 32 gauge x 5/32" Ndle USE TWICE DAILY    blood sugar diagnostic (BLOOD GLUCOSE TEST) Strp Use to check blood glucose 3 times daily. Device is Accucheck.    blood-glucose meter Misc Use ad directed (Accu Check Beverly smartview meter)    clopidogrel (PLAVIX) 75 mg tablet TAKE 1 TABLET ONE TIME DAILY    cycloSPORINE (RESTASIS) 0.05 % ophthalmic emulsion Place 0.4 mLs (1 drop total) into both eyes 2 (two) times daily.    insulin aspart (NOVOLOG FLEXPEN) 100 unit/mL InPn pen Inject 4/6/6 units into skin with correction scale 150-200 +1, 201-250 +2, 251-300 +3, 301-350 +4, >350 +5.    LEVEMIR FLEXTOUCH 100 unit/mL (3 mL) InPn pen INJECT 35 UNITS INTO THE SKIN EVERY EVENING.    losartan (COZAAR) 50 MG tablet TAKE 1 TABLET ONE TIME DAILY    multivitamin (CHEWABLE MULTI VITAMIN) Chew Take 2 tablets by mouth once daily.     naproxen (NAPROSYN) 500 MG tablet Take 1 tablet (500 mg total) by mouth 2 (two) times daily with meals.    NOVOFINE 32 32 gauge x 1/4" Ndle USE WITH LEVEMIR PEN EVERY EVENING    omeprazole (PRILOSEC) 40 MG capsule Take 40 mg by mouth once daily at 6am.    pantoprazole (PROTONIX) 40 MG tablet Take 1 tablet (40 mg total) " by mouth once daily.    PROCTOZONE-HC 2.5 % rectal cream Place rectally 2 (two) times daily.    TRUEPLUS LANCETS 28 gauge Misc USE  TO CHECK BLOOD SUGAR THREE TIMES DAILY    vitamin D 1000 units Tab Take 1 tablet (1,000 Units total) by mouth once daily.    acetaminophen (TYLENOL) 500 MG tablet Take 1 tablet (500 mg total) by mouth every 4 (four) hours as needed for Pain.    amitriptyline (ELAVIL) 10 MG tablet Take 10 mg by mouth once daily.     cyanocobalamin 1,000 mcg/mL injection INJECT 1 ML UNDER THE SKIN EVERY 2 WEEKS (Patient taking differently: INJECT 1 ML UNDER THE SKIN EVERY 3 WEEKS)    triamcinolone acetonide 0.1% (KENALOG) 0.1 % cream Apply topically 2 (two) times daily.    UNABLE TO FIND Places uses Wellpatch daily    VOLTAREN 1 % Gel      Family History     Problem Relation (Age of Onset)    COPD     Coronary artery disease         Social History Main Topics    Smoking status: Never Smoker    Smokeless tobacco: Never Used    Alcohol use No    Drug use: No    Sexual activity: No     Review of Systems   Constitutional: Negative for chills, diaphoresis, fatigue and fever.   HENT: Negative for congestion, hearing loss, rhinorrhea, sinus pressure, sore throat and trouble swallowing.    Eyes: Negative for photophobia and visual disturbance.   Respiratory: Negative for chest tightness, shortness of breath and wheezing.    Cardiovascular: Negative for chest pain, palpitations and leg swelling.   Gastrointestinal: Positive for abdominal pain (mild; generalized) and vomiting. Negative for abdominal distention, diarrhea and nausea.   Endocrine: Negative for cold intolerance and heat intolerance.   Genitourinary: Negative for difficulty urinating, dysuria, flank pain, frequency and hematuria.   Musculoskeletal: Positive for gait problem (2/2 CVA ). Negative for back pain, joint swelling, myalgias and neck pain.   Skin: Negative for rash and wound.   Allergic/Immunologic: Negative for immunocompromised  state.   Neurological: Positive for speech difficulty (at baseline 2/2 tongue ressection). Negative for dizziness, seizures, facial asymmetry and numbness.   Hematological: Negative for adenopathy. Does not bruise/bleed easily.   Psychiatric/Behavioral: Negative for agitation, confusion and dysphoric mood. The patient is not nervous/anxious.      Objective:     Vital Signs (Most Recent):  Temp: 97.7 °F (36.5 °C) (07/09/17 1600)  Pulse: 88 (07/09/17 1600)  Resp: 18 (07/09/17 1600)  BP: (!) 140/68 (07/09/17 1600)  SpO2: 95 % (07/09/17 1600) Vital Signs (24h Range):  Temp:  [97.7 °F (36.5 °C)-98.3 °F (36.8 °C)] 97.7 °F (36.5 °C)  Pulse:  [78-91] 88  Resp:  [18-27] 18  SpO2:  [94 %-98 %] 95 %  BP: (139-172)/(66-95) 140/68     Weight: 61.7 kg (136 lb)  Body mass index is 27.47 kg/m².    Physical Exam   Constitutional: She is oriented to person, place, and time. She appears well-developed and well-nourished. No distress.   HENT:   Head: Normocephalic and atraumatic.   S/p L partial glossectomy    Eyes: Conjunctivae are normal. Pupils are equal, round, and reactive to light. No scleral icterus.   Neck: Normal range of motion. Neck supple. No tracheal deviation present.   Cardiovascular: Normal rate, regular rhythm, normal heart sounds and intact distal pulses.    No murmur heard.  Pulmonary/Chest: Effort normal and breath sounds normal. No respiratory distress. She has no wheezes.   Abdominal: Soft. Bowel sounds are normal. She exhibits no distension. There is no tenderness.   Mild TTP   Musculoskeletal: Normal range of motion. She exhibits no edema.   Neurological: She is alert and oriented to person, place, and time. No cranial nerve deficit.   Skin: Skin is warm and dry. She is not diaphoretic. No erythema.   Psychiatric: She has a normal mood and affect. Her behavior is normal.   Nursing note and vitals reviewed.       Significant Labs: All pertinent labs within the past 24 hours have been reviewed.    Significant  Imaging: I have reviewed all pertinent imaging results/findings within the past 24 hours.

## 2017-07-09 NOTE — HPI
"81 year old female with a PMHx of HTN, HLD, DM2, CVA with residual L sided weakness (on Plavix), GERD, and tongue cancer s/p partial glossectomy presenting to the ED c/o dark emesis. Pt states she was out of town in [place] with family and started feeling poor yesterday evening from "indigestion." Pt states she had chest pain this morning "in the center of my chest" described as stabbing and "worse than my usual indigestion." Pt reports three episodes of "black" emesis "so my son brought me to the ER." At the time of my exam, pt denies chest pain, SOB, abdominal pain, nausea, diarrhea, fever, chills.       In the ED, pt is hypertensive. Pt is afebrile without leukocytosis. Hgb 10.2 (at baseline). Guaiac positive. Troponin WNL. CXR shows no acute process. EKG without changes. GI consulted in the ED.  "

## 2017-07-09 NOTE — HOSPITAL COURSE
Pt admitted to observation for GI bleed. GI consulted in the ED; awaiting recs. ASA and Plavix held on admit. NPO after midnight.  7/10: Hgb 10.2->8.7. H/H q6 hours; blood consent signed and placed in chart. Per GI, CLD and NPO at [facility] for EGD tomorrow. Will continue Protonix 40 mg BID per recs.   7/11: H/H stable. EGD shows mucosal changes in the duodenum with stenosis, suspicious for malignancy; biopsied; may be causing slight gastric outlet obstruction; no sources for bleeding seen. GI rec CT abd/pelvis with pancreas protocol with contrast. Resumed ASA today per recs. Plan to resume Plavix tomorrow.  7/12: CT abd/pelvis shows circumferential thickening of the distal thoracic esophagus extending to the gastroesophageal junction; malignancy is not excluded. Per GI, pt to follow up with clinic for biopsy results. Pt discharged home with resumed ASA and plavix. Placed amb ref to GI. Pt to follow up with PCP as an outpatient.

## 2017-07-09 NOTE — ASSESSMENT & PLAN NOTE
S/p L pubic fracture; follows with ortho as outpatient  - Continue acetaminophen-codeine PRN  - Pt reports poor tolerance to opioids

## 2017-07-09 NOTE — ED PROVIDER NOTES
"Encounter Date: 7/9/2017       History     Chief Complaint   Patient presents with    Hematemesis     family states vomiting "black" // currently on blood thinners, intermittent chest pain     Pt. Is an 82 yo F with pmh of HTN, DM2, TIAs(2013 on ASA and plavix) and GERD (poorly controlled) who presents with chest pain and dark emesis. Pt. Notes that ~8pm last night she began experiencing mid-sternal burning CP, 6/10, non-radiating that has intermittently persisted throughout the night in to this morning. She has experienced burning pain like this before that is associated with GERD, however she notes that this pain is different in that it is more intense than usual. She denies any history of cardiac disease. Pt. Also notes that at ~8am this morning she became nauseous and had 3 episodes of "black" emesis. She denies ever having experienced dark emesis like this before. She notes that her GERD has been very poorly controlled lately. Concerning plavix use, she was started on ASA and plavix in 2013 following an admission for a TIA.           Review of patient's allergies indicates:   Allergen Reactions    Baclofen Nausea And Vomiting and Other (See Comments)     Dizziness    Benazepril      Other reaction(s): Rash,Swelling    Carisoprodol      Other reaction(s): Dysphoria    Desloratadine      Other reaction(s): Nausea  Other reaction(s): Nausea    Hydrocodone      Other reaction(s): disorientation    Methocarbamol      Other reaction(s): Nausea    Omeprazole Nausea And Vomiting    Oxybutynin      Other reaction(s): Hallucinations    Oxycodone Nausea And Vomiting    Tramadol      Other reaction(s): dizziness    Benazepril-hydrochlorothiazide Rash     Other reaction(s): Swelling    Metformin Diarrhea    Prednisone Rash     Past Medical History:   Diagnosis Date    Anemia     Aneurysm of aorta     Back pain     Cataract     Diabetes mellitus     sees Endocrine yearly ??   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    Melanoma 2011    melanoma in situ of R nasal tip, excised with FTSG and treated with Aldara    MGUS (monoclonal gammopathy of unknown significance)     Dr Hodges    Osteopenia     Senile osteoporosis     Skin cancer     Skin neoplasm     Stroke     left weak -     Tongue cancer 2010    [id number]    Trouble in sleeping     Urinary incontinence     Vitamin B 12 deficiency     Vitamin B12 deficiency     Vitamin K deficiency      Past Surgical History:   Procedure Laterality Date    APPENDECTOMY      CATARACT EXTRACTION W/  INTRAOCULAR LENS IMPLANT Left 10/16/14    OS ()    CHOLECYSTECTOMY      COLECTOMY      COLONOSCOPY      EYE SURGERY Bilateral     cataracts    eyelid surgery      HYSTERECTOMY      PARTIAL GLOSSECTOMY  2010    SELECTIVE NECK DISSECTION  2010    tonsilectomy      TONSILLECTOMY       Family History   Problem Relation Age of Onset    Coronary artery disease      COPD      Skin cancer Neg Hx     Melanoma Neg Hx      Social History   Substance Use Topics    Smoking status: Never Smoker    Smokeless tobacco: Never Used    Alcohol use No     Review of Systems   Constitutional: Negative for fever.   HENT: Negative for sore throat.    Respiratory: Negative for shortness of breath.    Cardiovascular: Positive for chest pain (mid-sternal burning ).   Gastrointestinal: Positive for nausea and vomiting. Negative for abdominal pain and blood in stool.   Genitourinary: Negative for dysuria.    Musculoskeletal: Negative for back pain.   Skin: Negative for rash.   Neurological: Negative for weakness.   Hematological: Does not bruise/bleed easily.   Psychiatric/Behavioral: Negative for confusion.       Physical Exam     Initial Vitals [07/09/17 1023]   BP Pulse Resp Temp SpO2   (!) 158/71 83 19 98.3 °F (36.8 °C) 98 %      MAP       100         Physical Exam    Constitutional: She appears well-developed and well-nourished. No distress.   HENT:   Head: Normocephalic and atraumatic.   Eyes: Conjunctivae and EOM are normal. Pupils are equal, round, and reactive to light.   Neck: Normal range of motion. Neck supple. No tracheal deviation present.   Cardiovascular: Normal rate, regular rhythm, normal heart sounds and intact distal pulses. Exam reveals no gallop and no friction rub.    No murmur heard.  Pulmonary/Chest: Breath sounds normal. No respiratory distress. She has no wheezes. She has no rhonchi. She has no rales.   Abdominal: Soft. Bowel sounds are normal. She exhibits no distension. There is no tenderness. There is no rebound and no guarding.   Musculoskeletal: Normal range of motion.   Neurological: She is alert and oriented to person, place, and time. She has normal strength.   Skin: Skin is warm and dry.   Psychiatric: She has a normal mood and affect.         ED Course   Procedures  Labs Reviewed   CBC W/ AUTO DIFFERENTIAL - Abnormal; Notable for the following:        Result Value    Hemoglobin 10.2 (*)     Hematocrit 32.9 (*)     MCH 25.4 (*)     MCHC 31.0 (*)     RDW 14.8 (*)     Gran # 9.2 (*)     Gran% 83.8 (*)     Lymph% 9.2 (*)     All other components within normal limits   COMPREHENSIVE METABOLIC PANEL - Abnormal; Notable for the following:     Glucose 148 (*)     All other components within normal limits   TROPONIN I   APTT   PROTIME-INR   LACTIC ACID, PLASMA   TYPE & SCREEN             Medical Decision Making:   Initial Assessment:   Pt. Is an 82 yo F with pmh of HTN, DM2, TIAs(2013 on  ASA and plavix) and GERD (poorly controlled) who presents with chest pain and dark emesis.   Differential Diagnosis:   GIB, ACS, esophagitis   Clinical Tests:   Lab Tests: Ordered  Radiological Study: Ordered  Medical Tests: Ordered  ED Management:  Rectal exam with brown stool, Guaiac positive. GI consulted, plan for possible EGD tomorrow. H/H stable, Plan to admit to observation.               Attending Attestation:   Physician Attestation Statement for Resident:  As the supervising MD   Physician Attestation Statement: I have personally seen and examined this patient.   I agree with the above history. -: 80 yo f, h/o DM, CVA, on plavix/ASA, no h/o etoh abuse/liver disease, s/p multiple episodes hematemesis this morning, no BRBPR/no melena.  Suspect upper GI bleed, likely 2/2 ulcer vs gastritis.  Will check labs, give protonix, plan on GI consult and admission for serial H&H and endoscopy.  Pt high risk as on plavix   As the supervising MD I agree with the above PE.    As the supervising MD I agree with the above treatment, course, plan, and disposition.  I have reviewed and agree with the residents interpretation of the following: lab data and EKG.  I have reviewed the following: old records at this facility.                    ED Course     Clinical Impression:   The primary encounter diagnosis was Gastrointestinal hemorrhage, unspecified gastrointestinal hemorrhage type. Diagnoses of Chest pain, Anemia, unspecified type, and Gastrointestinal hemorrhage with hematemesis were also pertinent to this visit.                           Claribel Boyd MD  07/11/17 0400

## 2017-07-09 NOTE — ASSESSMENT & PLAN NOTE
S/p partial L glossectomy  - follows with ENT as outpatient  - Dysarthria at baseline  - No changes per patient

## 2017-07-09 NOTE — H&P
"[facility] Hospital Medicine  History & Physical    Patient Name: Marina Trimble  MRN: [id number]  Admission Date: 7/9/2017  Attending Physician: Jean De Souza MD   Primary Care Provider: Jose Jimenez MD    [facility] Medicine Team: [facility]OSP MED F Claribel Zarco PA-C     Patient information was obtained from patient and ER records.     Subjective:     Principal Problem:Gastrointestinal hemorrhage    Chief Complaint:   Chief Complaint   Patient presents with    Hematemesis     family states vomiting "black" // currently on blood thinners, intermittent chest pain        HPI: 81 year old female with a PMHx of HTN, HLD, DM2, CVA with residual L sided weakness (on Plavix), GERD, and tongue cancer s/p partial glossectomy presenting to the ED c/o dark emesis. Pt states she was out of town in [place] with family and started feeling poor yesterday evening from "indigestion." Pt states she had chest pain this morning "in the center of my chest" described as stabbing and "worse than my usual indigestion." Pt reports three episodes of "black" emesis "so my son brought me to the ER." At the time of my exam, pt denies chest pain, SOB, abdominal pain, nausea, diarrhea, fever, chills.       In the ED, pt is hypertensive. Pt is afebrile without leukocytosis. Hgb 10.2 (at baseline). Guaiac positive. Troponin WNL. CXR shows no acute process. EKG without changes. GI consulted in the ED.    Past Medical History:   Diagnosis Date    Anemia     Aneurysm of aorta     Back pain     Cataract     Diabetes mellitus     sees Endocrine yearly ??   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    Melanoma 2011    melanoma in situ of R nasal tip, excised with FTSG and treated with Aldara    MGUS (monoclonal gammopathy of unknown significance)     Dr Hodges    Osteopenia     Senile osteoporosis     Skin cancer     Skin neoplasm     Stroke     left weak -     Tongue cancer 2010    [id number]    Trouble in sleeping     Urinary incontinence     Vitamin B 12 deficiency     Vitamin B12 deficiency     Vitamin K deficiency        Past Surgical History:   Procedure Laterality Date    APPENDECTOMY      CATARACT EXTRACTION W/  INTRAOCULAR LENS IMPLANT Left 10/16/14    OS ()    CHOLECYSTECTOMY      COLECTOMY      eyelid surgery      HYSTERECTOMY      PARTIAL GLOSSECTOMY  2010    SELECTIVE NECK DISSECTION  2010    tonsilectomy      TONSILLECTOMY         Review of patient's allergies indicates:   Allergen Reactions    Baclofen Nausea And Vomiting and Other (See Comments)     Dizziness    Benazepril      Other reaction(s): Rash,Swelling    Carisoprodol      Other reaction(s): Dysphoria    Desloratadine      Other reaction(s): Nausea  Other reaction(s): Nausea    Hydrocodone      Other reaction(s): disorientation    Methocarbamol      Other reaction(s): Nausea    Omeprazole Nausea And Vomiting    Oxybutynin      Other reaction(s): Hallucinations    Oxycodone Nausea And Vomiting    Tramadol      Other reaction(s): dizziness    Benazepril-hydrochlorothiazide Rash     Other reaction(s): Swelling    Metformin Diarrhea    Prednisone Rash       No current facility-administered medications on file prior to encounter.      Current Outpatient Prescriptions on File Prior to Encounter   Medication Sig    ACCU-CHEK FASTCLIX Misc USE TO CHECK BLOOD GLUCOSE THREE TIMES DAILY    acetaminophen-codeine 300-30mg (TYLENOL #3) 300-30 mg Tab Take 1 tablet by mouth every 6 (six) hours as  "needed.    amlodipine (NORVASC) 5 MG tablet Take 1 tablet (5 mg total) by mouth once daily.    aspirin (ECOTRIN) 81 MG EC tablet Take 81 mg by mouth once daily.    atorvastatin (LIPITOR) 40 MG tablet TAKE 1 TABLET ONE TIME DAILY    BD ULTRA-FINE BEVERLY PEN NEEDLES 32 gauge x 5/32" Ndle USE TWICE DAILY    blood sugar diagnostic (BLOOD GLUCOSE TEST) Strp Use to check blood glucose 3 times daily. Device is Accucheck.    blood-glucose meter Misc Use ad directed (Accu Check Beverly smartview meter)    clopidogrel (PLAVIX) 75 mg tablet TAKE 1 TABLET ONE TIME DAILY    cycloSPORINE (RESTASIS) 0.05 % ophthalmic emulsion Place 0.4 mLs (1 drop total) into both eyes 2 (two) times daily.    insulin aspart (NOVOLOG FLEXPEN) 100 unit/mL InPn pen Inject 4/6/6 units into skin with correction scale 150-200 +1, 201-250 +2, 251-300 +3, 301-350 +4, >350 +5.    LEVEMIR FLEXTOUCH 100 unit/mL (3 mL) InPn pen INJECT 35 UNITS INTO THE SKIN EVERY EVENING.    losartan (COZAAR) 50 MG tablet TAKE 1 TABLET ONE TIME DAILY    multivitamin (CHEWABLE MULTI VITAMIN) Chew Take 2 tablets by mouth once daily.     naproxen (NAPROSYN) 500 MG tablet Take 1 tablet (500 mg total) by mouth 2 (two) times daily with meals.    NOVOFINE 32 32 gauge x 1/4" Ndle USE WITH LEVEMIR PEN EVERY EVENING    omeprazole (PRILOSEC) 40 MG capsule Take 40 mg by mouth once daily at 6am.    pantoprazole (PROTONIX) 40 MG tablet Take 1 tablet (40 mg total) by mouth once daily.    PROCTOZONE-HC 2.5 % rectal cream Place rectally 2 (two) times daily.    TRUEPLUS LANCETS 28 gauge Misc USE  TO CHECK BLOOD SUGAR THREE TIMES DAILY    vitamin D 1000 units Tab Take 1 tablet (1,000 Units total) by mouth once daily.    acetaminophen (TYLENOL) 500 MG tablet Take 1 tablet (500 mg total) by mouth every 4 (four) hours as needed for Pain.    amitriptyline (ELAVIL) 10 MG tablet Take 10 mg by mouth once daily.     cyanocobalamin 1,000 mcg/mL injection INJECT 1 ML UNDER THE SKIN EVERY " 2 WEEKS (Patient taking differently: INJECT 1 ML UNDER THE SKIN EVERY 3 WEEKS)    triamcinolone acetonide 0.1% (KENALOG) 0.1 % cream Apply topically 2 (two) times daily.    UNABLE TO FIND Places uses [employer] daily    VOLTAREN 1 % Gel      Family History     Problem Relation (Age of Onset)    COPD     Coronary artery disease         Social History Main Topics    Smoking status: Never Smoker    Smokeless tobacco: Never Used    Alcohol use No    Drug use: No    Sexual activity: No     Review of Systems   Constitutional: Negative for chills, diaphoresis, fatigue and fever.   HENT: Negative for congestion, hearing loss, rhinorrhea, sinus pressure, sore throat and trouble swallowing.    Eyes: Negative for photophobia and visual disturbance.   Respiratory: Negative for chest tightness, shortness of breath and wheezing.    Cardiovascular: Negative for chest pain, palpitations and leg swelling.   Gastrointestinal: Positive for abdominal pain (mild; generalized) and vomiting. Negative for abdominal distention, diarrhea and nausea.   Endocrine: Negative for cold intolerance and heat intolerance.   Genitourinary: Negative for difficulty urinating, dysuria, flank pain, frequency and hematuria.   Musculoskeletal: Positive for gait problem (2/2 CVA ). Negative for back pain, joint swelling, myalgias and neck pain.   Skin: Negative for rash and wound.   Allergic/Immunologic: Negative for immunocompromised state.   Neurological: Positive for speech difficulty (at baseline 2/2 tongue ressection). Negative for dizziness, seizures, facial asymmetry and numbness.   Hematological: Negative for adenopathy. Does not bruise/bleed easily.   Psychiatric/Behavioral: Negative for agitation, confusion and dysphoric mood. The patient is not nervous/anxious.      Objective:     Vital Signs (Most Recent):  Temp: 97.7 °F (36.5 °C) (07/09/17 1600)  Pulse: 88 (07/09/17 1600)  Resp: 18 (07/09/17 1600)  BP: (!) 140/68 (07/09/17 1600)  SpO2: 95  % (07/09/17 1600) Vital Signs (24h Range):  Temp:  [97.7 °F (36.5 °C)-98.3 °F (36.8 °C)] 97.7 °F (36.5 °C)  Pulse:  [78-91] 88  Resp:  [18-27] 18  SpO2:  [94 %-98 %] 95 %  BP: (139-172)/(66-95) 140/68     Weight: 61.7 kg (136 lb)  Body mass index is 27.47 kg/m².    Physical Exam   Constitutional: She is oriented to person, place, and time. She appears well-developed and well-nourished. No distress.   HENT:   Head: Normocephalic and atraumatic.   S/p L partial glossectomy    Eyes: Conjunctivae are normal. Pupils are equal, round, and reactive to light. No scleral icterus.   Neck: Normal range of motion. Neck supple. No tracheal deviation present.   Cardiovascular: Normal rate, regular rhythm, normal heart sounds and intact distal pulses.    No murmur heard.  Pulmonary/Chest: Effort normal and breath sounds normal. No respiratory distress. She has no wheezes.   Abdominal: Soft. Bowel sounds are normal. She exhibits no distension. There is no tenderness.   Mild TTP   Musculoskeletal: Normal range of motion. She exhibits no edema.   Neurological: She is alert and oriented to person, place, and time. No cranial nerve deficit.   Skin: Skin is warm and dry. She is not diaphoretic. No erythema.   Psychiatric: She has a normal mood and affect. Her behavior is normal.   Nursing note and vitals reviewed.       Significant Labs: All pertinent labs within the past 24 hours have been reviewed.    Significant Imaging: I have reviewed all pertinent imaging results/findings within the past 24 hours.    Assessment/Plan:     * Gastrointestinal hemorrhage    - 3 episodes of black emesis  - Hgb 10.2 on admit; at baseline. Guaiac positive in ED  - GI consulted in the ED; awaiting recs  - Received IV protonix in the ED  - NPO at [place]          Chest pain    - Resolved on admit; likely 2/2 GERD  - Troponin WNL. EKG without changes  - CXR shows no acute process  - EKG PRN chest pain        Diabetes mellitus type 2, uncontrolled    - A1c 8.1  -  Cardiac/DM diet  - Low dose SSI. Adjusted insulin regimen; will titrate PRN          Hyperlipidemia    - Continue ASA, lipitor          Essential hypertension    - BP elevated on admit  - Continue home norvasc and losartan  - Will continue to monitor        Back pain    S/p L pubic fracture; follows with ortho as outpatient  - Continue acetaminophen-codeine PRN  - Pt reports poor tolerance to opioids          History of CVA (cerebrovascular accident)    - No deficits on admit  - Continue Plavix           History of tongue cancer    S/p partial L glossectomy  - follows with ENT as outpatient  - Dysarthria at baseline  - No changes per patient            VTE Risk Mitigation         Ordered     Reason for No Pharmacological VTE Prophylaxis  Once      07/09/17 1424     High Risk of VTE  Once      07/09/17 1421     Place LAITH hose  Until discontinued      07/09/17 1421     Place sequential compression device  Until discontinued      07/09/17 1421        Claribel Zarco PA-C  Department of Hospital Medicine   [facility]

## 2017-07-09 NOTE — ASSESSMENT & PLAN NOTE
- 3 episodes of black emesis  - Hgb 10.2 on admit; at baseline. Guaiac positive in ED  - GI consulted in the ED; awaiting recs  - Received IV protonix in the ED  - NPO at [facility]

## 2017-07-09 NOTE — ASSESSMENT & PLAN NOTE
- Resolved on admit; likely 2/2 GERD  - Troponin WNL. EKG without changes  - CXR shows no acute process  - EKG PRN chest pain

## 2017-07-09 NOTE — ED TRIAGE NOTES
Patient here with CP starting last night, nauseous last night but no vomiting until this AM which patient reports as black emesis.

## 2017-07-10 ENCOUNTER — ANESTHESIA EVENT (OUTPATIENT)
Dept: ENDOSCOPY | Facility: HOSPITAL | Age: 82
End: 2017-07-10
Payer: MEDICARE

## 2017-07-10 LAB
ALBUMIN SERPL BCP-MCNC: 3.1 G/DL
ALP SERPL-CCNC: 53 U/L
ALT SERPL W/O P-5'-P-CCNC: 9 U/L
ANION GAP SERPL CALC-SCNC: 10 MMOL/L
AST SERPL-CCNC: 18 U/L
BASOPHILS # BLD AUTO: 0.03 K/UL
BASOPHILS NFR BLD: 0.5 %
BILIRUB SERPL-MCNC: 0.4 MG/DL
BUN SERPL-MCNC: 13 MG/DL
CALCIUM SERPL-MCNC: 9.2 MG/DL
CHLORIDE SERPL-SCNC: 104 MMOL/L
CO2 SERPL-SCNC: 28 MMOL/L
CREAT SERPL-MCNC: 0.7 MG/DL
DIFFERENTIAL METHOD: ABNORMAL
EOSINOPHIL # BLD AUTO: 0.3 K/UL
EOSINOPHIL NFR BLD: 4.5 %
ERYTHROCYTE [DISTWIDTH] IN BLOOD BY AUTOMATED COUNT: 15 %
EST. GFR  (AFRICAN AMERICAN): >60 ML/MIN/1.73 M^2
EST. GFR  (NON AFRICAN AMERICAN): >60 ML/MIN/1.73 M^2
GLUCOSE SERPL-MCNC: 106 MG/DL
HCT VFR BLD AUTO: 28.2 %
HCT VFR BLD AUTO: 28.3 %
HCT VFR BLD AUTO: 28.3 %
HCT VFR BLD AUTO: 28.4 %
HGB BLD-MCNC: 8.6 G/DL
HGB BLD-MCNC: 8.7 G/DL
HGB BLD-MCNC: 8.7 G/DL
HGB BLD-MCNC: 8.8 G/DL
LYMPHOCYTES # BLD AUTO: 1.4 K/UL
LYMPHOCYTES NFR BLD: 21.7 %
MAGNESIUM SERPL-MCNC: 1.2 MG/DL
MCH RBC QN AUTO: 25.3 PG
MCHC RBC AUTO-ENTMCNC: 30.7 %
MCV RBC AUTO: 82 FL
MONOCYTES # BLD AUTO: 0.5 K/UL
MONOCYTES NFR BLD: 8.1 %
NEUTROPHILS # BLD AUTO: 4.3 K/UL
NEUTROPHILS NFR BLD: 65 %
PHOSPHATE SERPL-MCNC: 4 MG/DL
PLATELET # BLD AUTO: 287 K/UL
PMV BLD AUTO: 9.7 FL
POCT GLUCOSE: 104 MG/DL (ref 70–110)
POCT GLUCOSE: 106 MG/DL (ref 70–110)
POCT GLUCOSE: 123 MG/DL (ref 70–110)
POCT GLUCOSE: 150 MG/DL (ref 70–110)
POCT GLUCOSE: 93 MG/DL (ref 70–110)
POTASSIUM SERPL-SCNC: 3.4 MMOL/L
PROT SERPL-MCNC: 6.1 G/DL
RBC # BLD AUTO: 3.44 M/UL
SODIUM SERPL-SCNC: 142 MMOL/L
WBC # BLD AUTO: 6.64 K/UL

## 2017-07-10 PROCEDURE — 85018 HEMOGLOBIN: CPT | Mod: 91

## 2017-07-10 PROCEDURE — 83735 ASSAY OF MAGNESIUM: CPT

## 2017-07-10 PROCEDURE — 84100 ASSAY OF PHOSPHORUS: CPT

## 2017-07-10 PROCEDURE — 80053 COMPREHEN METABOLIC PANEL: CPT

## 2017-07-10 PROCEDURE — 85025 COMPLETE CBC W/AUTO DIFF WBC: CPT

## 2017-07-10 PROCEDURE — 85014 HEMATOCRIT: CPT

## 2017-07-10 PROCEDURE — 25000003 PHARM REV CODE 250: Performed by: PHYSICIAN ASSISTANT

## 2017-07-10 PROCEDURE — G0378 HOSPITAL OBSERVATION PER HR: HCPCS

## 2017-07-10 PROCEDURE — 99225 PR SUBSEQUENT OBSERVATION CARE,LEVEL II: CPT | Mod: ,,, | Performed by: PHYSICIAN ASSISTANT

## 2017-07-10 PROCEDURE — 99204 OFFICE O/P NEW MOD 45 MIN: CPT | Mod: GC,,, | Performed by: INTERNAL MEDICINE

## 2017-07-10 PROCEDURE — 36415 COLL VENOUS BLD VENIPUNCTURE: CPT

## 2017-07-10 RX ORDER — LANOLIN ALCOHOL/MO/W.PET/CERES
400 CREAM (GRAM) TOPICAL EVERY 4 HOURS
Status: DISPENSED | OUTPATIENT
Start: 2017-07-10 | End: 2017-07-11

## 2017-07-10 RX ORDER — PANTOPRAZOLE SODIUM 40 MG/1
40 TABLET, DELAYED RELEASE ORAL 2 TIMES DAILY
Status: DISCONTINUED | OUTPATIENT
Start: 2017-07-10 | End: 2017-07-12 | Stop reason: HOSPADM

## 2017-07-10 RX ORDER — POTASSIUM CHLORIDE 20 MEQ/1
40 TABLET, EXTENDED RELEASE ORAL ONCE
Status: COMPLETED | OUTPATIENT
Start: 2017-07-10 | End: 2017-07-10

## 2017-07-10 RX ADMIN — Medication 3 ML: at 06:07

## 2017-07-10 RX ADMIN — Medication 3 ML: at 03:07

## 2017-07-10 RX ADMIN — ATORVASTATIN CALCIUM 40 MG: 20 TABLET, FILM COATED ORAL at 03:07

## 2017-07-10 RX ADMIN — VITAMIN D, TAB 1000IU (100/BT) 1000 UNITS: 25 TAB at 03:07

## 2017-07-10 RX ADMIN — MAGNESIUM OXIDE TAB 400 MG (241.3 MG ELEMENTAL MG) 400 MG: 400 (241.3 MG) TAB at 04:07

## 2017-07-10 RX ADMIN — AMLODIPINE BESYLATE 5 MG: 5 TABLET ORAL at 03:07

## 2017-07-10 RX ADMIN — MAGNESIUM OXIDE TAB 400 MG (241.3 MG ELEMENTAL MG) 400 MG: 400 (241.3 MG) TAB at 06:07

## 2017-07-10 RX ADMIN — MAGNESIUM OXIDE TAB 400 MG (241.3 MG ELEMENTAL MG) 400 MG: 400 (241.3 MG) TAB at 09:07

## 2017-07-10 RX ADMIN — PANTOPRAZOLE SODIUM 40 MG: 40 TABLET, DELAYED RELEASE ORAL at 09:07

## 2017-07-10 RX ADMIN — POTASSIUM CHLORIDE 40 MEQ: 1500 TABLET, EXTENDED RELEASE ORAL at 04:07

## 2017-07-10 NOTE — ASSESSMENT & PLAN NOTE
"Marina Trimble is a 81 y.o. F who presented to the ED yesterday after 3 epiosdes of black emesis a/w SOB and worsenign substernal CP described as normal reflux related pain, but more severe, since the prior evening. Patient has baseline anemia since treatment for melanoma and lymphoma in 2012. Takes "shots for her blood count" every 3 weeks. (unable to determine the med via chart review.) Has not had additional episodes of emesis since admission    -Recommend serial H/H to monitor for additional bleeds  -Plavix has been d/c'd  -Continue Protonix injection 80mg  -Plan to perform EGD for acute GIB  "

## 2017-07-10 NOTE — PLAN OF CARE
07/10/17 0930   Discharge Assessment   Assessment Type Discharge Planning Assessment   Confirmed/corrected address and phone number on facesheet? No   Assessment information obtained from? Medical Record   Expected Length of Stay (days) 2   Prior to hospitilization cognitive status: Alert/Oriented   Prior to hospitalization functional status: Assistive Equipment   Current cognitive status: Alert/Oriented   Current Functional Status: Assistive Equipment   Arrived From home or self-care   Lives With spouse   Able to Return to Prior Arrangements yes   Is patient able to care for self after discharge? Yes   Patient's perception of discharge disposition home or selfcare   Readmission Within The Last 30 Days no previous admission in last 30 days   Patient currently being followed by outpatient case management? No   Patient currently receives home health services? No   Does the patient currently use HME? Yes   Equipment Currently Used at Home rollator;bath bench   Do you have any problems affording any of your prescribed medications? No   Is the patient taking medications as prescribed? yes   Discharge Plan A Home with family   Discharge Plan B Home Health   Patient/Family In Agreement With Plan unable to assess   Patient currently off unit. Will follow for d/c needs.

## 2017-07-10 NOTE — ASSESSMENT & PLAN NOTE
S/p L pubic fracture; follows with ortho as outpatient  - Continue acetaminophen-codeine PRN  - Pt reports poor tolerance to opioids  7/10: Naproxen held pending EGD results

## 2017-07-10 NOTE — PROGRESS NOTES
"[facility] Hospital Medicine  Progress Note    Patient Name: Marina Trimble  MRN: [id number]  Patient Class: OP- Observation   Admission Date: 7/9/2017  Length of Stay: 0 days  Attending Physician: Jean De Souza MD  Primary Care Provider: Jose Jimenez MD    [facility] Medicine Team: [facility] Claribel Zarco PA-C    Subjective:     Principal Problem:Gastrointestinal hemorrhage    HPI:  81 year old female with a PMHx of HTN, HLD, DM2, CVA with residual L sided weakness (on Plavix), GERD, and tongue cancer s/p partial glossectomy presenting to the ED c/o dark emesis. Pt states she was out of town in [place] with family and started feeling poor yesterday evening from "indigestion." Pt states she had chest pain this morning "in the center of my chest" described as stabbing and "worse than my usual indigestion." Pt reports three episodes of "black" emesis "so my son brought me to the ER." At the time of my exam, pt denies chest pain, SOB, abdominal pain, nausea, diarrhea, fever, chills.       In the ED, pt is hypertensive. Pt is afebrile without leukocytosis. Hgb 10.2 (at baseline). Guaiac positive. Troponin WNL. CXR shows no acute process. EKG without changes. GI consulted in the ED.    Hospital Course:  Pt admitted to observation for GI bleed. GI consulted in the ED; awaiting recs. ASA and Plavix held on admit. NPO after midnight.  7/10: Hgb 10.2->8.7. H/H q6 hours; blood consent signed and placed in chart. Per GI, CLD and NPO at [facility] for EGD tomorrow. Will continue Protonix 40 mg BID per recs.     Interval History: Pt had no acute events overnight. This AM, pt lying in bed resting comfortably. Pt denies N/V and BMs since admit. Pt c/o hunger 2/2 NPO status. Discussed plan of care with patient.    Review of Systems   Constitutional: Negative for chills, diaphoresis, fatigue and fever.   Respiratory: Negative for chest tightness, shortness of breath and wheezing.    Cardiovascular: " Negative for chest pain, palpitations and leg swelling.   Gastrointestinal: Positive for abdominal pain (mild; generalized) and vomiting. Negative for abdominal distention, diarrhea and nausea.   Musculoskeletal: Positive for gait problem (2/2 CVA ). Negative for back pain, joint swelling, myalgias and neck pain.   Neurological: Positive for speech difficulty (at baseline 2/2 tongue ressection). Negative for dizziness, seizures, facial asymmetry and numbness.     Objective:     Vital Signs (Most Recent):  Temp: 98.9 °F (37.2 °C) (07/10/17 1519)  Pulse: 78 (07/10/17 1519)  Resp: 17 (07/10/17 1519)  BP: (!) 151/66 (07/10/17 1519)  SpO2: (!) 94 % (07/10/17 1519) Vital Signs (24h Range):  Temp:  [97.4 °F (36.3 °C)-98.9 °F (37.2 °C)] 98.9 °F (37.2 °C)  Pulse:  [72-95] 78  Resp:  [16-18] 17  SpO2:  [92 %-96 %] 94 %  BP: (137-151)/(60-68) 151/66     Weight: 61.7 kg (136 lb)  Body mass index is 27.47 kg/m².    Intake/Output Summary (Last 24 hours) at 07/10/17 1537  Last data filed at 07/09/17 2300   Gross per 24 hour   Intake              480 ml   Output                0 ml   Net              480 ml      Physical Exam   Constitutional: She is oriented to person, place, and time. She appears well-developed and well-nourished. No distress.   HENT:   S/p L partial glossectomy    Cardiovascular: Normal rate, regular rhythm, normal heart sounds and intact distal pulses.    No murmur heard.  Pulmonary/Chest: Effort normal and breath sounds normal. No respiratory distress. She has no wheezes.   Abdominal: Soft. Bowel sounds are normal. She exhibits no distension. There is no tenderness.   Mild TTP   Musculoskeletal: Normal range of motion. She exhibits no edema.   Neurological: She is alert and oriented to person, place, and time. No cranial nerve deficit.   Skin: Skin is warm and dry. She is not diaphoretic. No erythema.   Psychiatric: She has a normal mood and affect. Her behavior is normal.   Nursing note and vitals  reviewed.      Significant Labs: All pertinent labs within the past 24 hours have been reviewed.    Significant Imaging: I have reviewed all pertinent imaging results/findings within the past 24 hours.    Assessment/Plan:      * Gastrointestinal hemorrhage    - 3 episodes of black emesis  - Hgb 10.2 on admit; at baseline. Guaiac positive in ED  - GI consulted in the ED; awaiting recs  - Received IV protonix in the ED  - NPO at [facility]  7/10: Hgb 10.2->8.7. H/H q6 hours; blood consent signed and placed in chart. Per GI, CLD and NPO at [facility] for EGD tomorrow. Will continue Protonix 40 mg BID per recs.         Chest pain    - Resolved on admit; likely 2/2 GERD  - Troponin WNL. EKG without changes  - CXR shows no acute process  - EKG PRN chest pain  7/10: Pt denies chest pain        Diabetes mellitus type 2, uncontrolled    - A1c 8.1  - Cardiac/DM diet  - Low dose SSI. Adjusted insulin regimen; will titrate PRN          Hyperlipidemia    - Continue ASA, lipitor          Essential hypertension    - BP elevated on admit  - Continue home norvasc and losartan  - Will continue to monitor  7/10: BP controlled. Will continue to monitor        Back pain    S/p L pubic fracture; follows with ortho as outpatient  - Continue acetaminophen-codeine PRN  - Pt reports poor tolerance to opioids  7/10: Naproxen held pending EGD results        History of CVA (cerebrovascular accident)    - No deficits on admit  - ASA and plavix held on admit          History of tongue cancer    S/p partial L glossectomy  - follows with ENT as outpatient  - Dysarthria at baseline  - No changes per patient            VTE Risk Mitigation         Ordered     Reason for No Pharmacological VTE Prophylaxis  Once      07/09/17 1424     High Risk of VTE  Once      07/09/17 1421     Place LAITH hose  Until discontinued      07/09/17 1421     Place sequential compression device  Until discontinued      07/09/17 1421          Claribel Zarco PA-C  Department of Hospital  Medicine   [facility]

## 2017-07-10 NOTE — ASSESSMENT & PLAN NOTE
- BP elevated on admit  - Continue home norvasc and losartan  - Will continue to monitor  7/10: BP controlled. Will continue to monitor

## 2017-07-10 NOTE — HPI
Marina Trimble is a 81 y.o. F with HTN, DMII, TIA in 2013, baseline anemia (hx of melanoma and lymphoma), and GERD who presented to the ED yesterday 07/09/2017 after 3 episodes of black emesis. Patient endorses having sub-sternal CP and SOB that began the night before and was more intense than her normal GERD related pain. She states that the pain was constant in quality with intermittent worsening. She denies hematemesis or hematochezia, melena, or any change in her bowel habits. She states that she has been taking Tylenol 500mg BID for the past month due to worsening pain from a hip fracture 6 months ago. She states that she takes a shot every 3 weeks for her anemia, but cannot recall the name (I mentioned procrit and epo ) of the medication.

## 2017-07-10 NOTE — SUBJECTIVE & OBJECTIVE
Past Medical History:   Diagnosis Date    Anemia     Aneurysm of aorta     Back pain     Cataract     Diabetes mellitus     sees Endocrine yearly ??   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    Melanoma 2011    melanoma in situ of R nasal tip, excised with FTSG and treated with Aldara    MGUS (monoclonal gammopathy of unknown significance)     Dr Hodges    Osteopenia     Senile osteoporosis     Skin cancer     Skin neoplasm     Stroke     left weak -     Tongue cancer 2010    [id number]    Trouble in sleeping     Urinary incontinence     Vitamin B 12 deficiency     Vitamin B12 deficiency     Vitamin K deficiency        Past Surgical History:   Procedure Laterality Date    APPENDECTOMY      CATARACT EXTRACTION W/  INTRAOCULAR LENS IMPLANT Left 10/16/14    OS ()    CHOLECYSTECTOMY      COLECTOMY      eyelid surgery      HYSTERECTOMY      PARTIAL GLOSSECTOMY  2010    SELECTIVE NECK DISSECTION  2010    tonsilectomy      TONSILLECTOMY         Review of patient's allergies indicates:   Allergen Reactions    Baclofen Nausea And Vomiting and Other (See Comments)     Dizziness    Benazepril      Other reaction(s): Rash,Swelling    Carisoprodol      Other reaction(s): Dysphoria    Desloratadine      Other reaction(s): Nausea  Other reaction(s): Nausea    Hydrocodone      Other reaction(s): disorientation    Methocarbamol      Other reaction(s): Nausea    Omeprazole Nausea And Vomiting    Oxybutynin      Other reaction(s): Hallucinations    Oxycodone  Nausea And Vomiting    Tramadol      Other reaction(s): dizziness    Benazepril-hydrochlorothiazide Rash     Other reaction(s): Swelling    Metformin Diarrhea    Prednisone Rash     Family History     Problem Relation (Age of Onset)    COPD     Coronary artery disease         Social History Main Topics    Smoking status: Never Smoker    Smokeless tobacco: Never Used    Alcohol use No    Drug use: No    Sexual activity: No     Review of Systems   Constitutional: Negative for activity change and appetite change.   HENT: Negative for congestion and facial swelling.    Eyes: Negative for discharge and itching.   Respiratory: Negative for apnea and shortness of breath.    Cardiovascular: Negative for chest pain and palpitations.   Gastrointestinal: Positive for vomiting. Negative for abdominal distention, abdominal pain, anal bleeding and blood in stool.   Endocrine: Positive for polyuria. Negative for cold intolerance and heat intolerance.   Genitourinary: Positive for enuresis. Negative for difficulty urinating and dysuria.   Skin: Negative for color change and pallor.   Neurological: Negative for dizziness and headaches.     Objective:     Vital Signs (Most Recent):  Temp: 97.4 °F (36.3 °C) (07/10/17 0350)  Pulse: 74 (07/10/17 0700)  Resp: 16 (07/10/17 0350)  BP: 138/62 (07/10/17 0350)  SpO2: (!) 92 % (07/10/17 0350) Vital Signs (24h Range):  Temp:  [97.4 °F (36.3 °C)-98.3 °F (36.8 °C)] 97.4 °F (36.3 °C)  Pulse:  [72-95] 74  Resp:  [16-27] 16  SpO2:  [92 %-98 %] 92 %  BP: (137-172)/(60-95) 138/62     Weight: 61.7 kg (136 lb) (07/09/17 1023)  Body mass index is 27.47 kg/m².      Intake/Output Summary (Last 24 hours) at 07/10/17 0843  Last data filed at 07/09/17 2300   Gross per 24 hour   Intake              480 ml   Output                0 ml   Net              480 ml       Lines/Drains/Airways     Peripheral Intravenous Line                 Peripheral IV - Single Lumen 07/09/17 1055 Right Antecubital less  than 1 day                Physical Exam   Constitutional: She is oriented to person, place, and time. She appears well-developed and well-nourished.   HENT:   Head: Normocephalic and atraumatic.   Mouth/Throat: Oropharynx is clear and moist and mucous membranes are normal.       Eyes: Conjunctivae, EOM and lids are normal.   Neck: No neck rigidity. No edema and normal range of motion present.   Cardiovascular: Normal rate, regular rhythm and normal heart sounds.    Pulmonary/Chest: Effort normal and breath sounds normal.   Abdominal: Soft. Normal appearance and bowel sounds are normal. There is no hepatosplenomegaly. There is tenderness in the epigastric area. There is no guarding and negative Foley's sign.   Neurological: She is alert and oriented to person, place, and time.   Skin: Skin is warm, dry and intact.   Psychiatric: She has a normal mood and affect. Her speech is normal and behavior is normal.       Significant Labs:  CBC:   Recent Labs  Lab 07/09/17  1111 07/10/17  0439   WBC 11.00 6.64   HGB 10.2* 8.7*   HCT 32.9* 28.3*    287     CMP:   Recent Labs  Lab 07/10/17  0439      CALCIUM 9.2   ALBUMIN 3.1*   PROT 6.1      K 3.4*   CO2 28      BUN 13   CREATININE 0.7   ALKPHOS 53*   ALT 9*   AST 18   BILITOT 0.4     Coagulation:   Recent Labs  Lab 07/09/17  1111   INR 1.0   APTT <21.0       Significant Imaging:  CXR AP portable 07/09/2017  No detrimental change since 12/25/16.    Colonoscopy 02/07/2013  Impression:             - Non-thrombosed external hemorrhoids found on perianal exam.  - Patent end-to-side ileo-colonic anastomosis.  - Internal hemorrhoids.  - Diverticulosis in the ascending colon.

## 2017-07-10 NOTE — ASSESSMENT & PLAN NOTE
- Resolved on admit; likely 2/2 GERD  - Troponin WNL. EKG without changes  - CXR shows no acute process  - EKG PRN chest pain  7/10: Pt denies chest pain

## 2017-07-10 NOTE — ANESTHESIA PREPROCEDURE EVALUATION
07/10/2017  Pre-operative evaluation for Procedure(s) (LRB):  ESOPHAGOGASTRODUODENOSCOPY (EGD) (N/A)    Marina Trimble is a 81 y.o. female with HTN, DMII, TIA in 2013 (currently on Plavix), baseline anemia (hx of melanoma and lymphoma), and GERD who presented to the ED on 07/09/2017 after 3 episodes of large volume black emesis. She is scheduled for EGD.     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    Hyperlipidemia    Anemia    Hereditary and idiopathic peripheral neuropathy    Vitamin B12 deficiency    MGUS (monoclonal gammopathy of unknown significance)    Combined hyperlipidemia associated with type 2 diabetes mellitus    Diabetes mellitus type 2, uncontrolled    Senile osteoporosis    History of CVA (cerebrovascular accident)    Nuclear cataract    Bilateral dry eyes    PFO with atrial septal aneurysm    Diabetes mellitus with neurological manifestations, uncontrolled    Hemiparesis affecting left side as late effect of stroke    Encounter for long-term (current) use of insulin    Diabetic polyneuropathy    Atherosclerosis of aorta    Other malignant lymphomas of intrapelvic lymph nodes    History of compression fracture of vertebral column    Back pain    Type II or unspecified type diabetes mellitus with other specified manifestations, uncontrolled    DDD (degenerative disc disease), lumbar    Lumbar facet arthropathy    Sacroiliitis    Spine pain, lumbosacral    Cervical radiculopathy    Lumbar spinal stenosis    Pain    Status post cataract extraction and insertion of intraocular lens of left eye    Erosive osteoarthritis    Debility     "Hypomagnesemia    Chest pain    Gastrointestinal hemorrhage       Review of patient's allergies indicates:   Allergen Reactions    Baclofen Nausea And Vomiting and Other (See Comments)     Dizziness    Benazepril      Other reaction(s): Rash,Swelling    Carisoprodol      Other reaction(s): Dysphoria    Desloratadine      Other reaction(s): Nausea  Other reaction(s): Nausea    Hydrocodone      Other reaction(s): disorientation    Methocarbamol      Other reaction(s): Nausea    Omeprazole Nausea And Vomiting    Oxybutynin      Other reaction(s): Hallucinations    Oxycodone Nausea And Vomiting    Tramadol      Other reaction(s): dizziness    Benazepril-hydrochlorothiazide Rash     Other reaction(s): Swelling    Metformin Diarrhea    Prednisone Rash        No current facility-administered medications on file prior to encounter.      Current Outpatient Prescriptions on File Prior to Encounter   Medication Sig Dispense Refill    ACCU-CHEK FASTCLIX Misc USE TO CHECK BLOOD GLUCOSE THREE TIMES DAILY 1000 each 11    acetaminophen-codeine 300-30mg (TYLENOL #3) 300-30 mg Tab Take 1 tablet by mouth every 6 (six) hours as needed. 30 tablet 0    amlodipine (NORVASC) 5 MG tablet Take 1 tablet (5 mg total) by mouth once daily. 90 tablet 4    aspirin (ECOTRIN) 81 MG EC tablet Take 81 mg by mouth once daily.      atorvastatin (LIPITOR) 40 MG tablet TAKE 1 TABLET ONE TIME DAILY 90 tablet 3    BD ULTRA-FINE BEVERLY PEN NEEDLES 32 gauge x 5/32" Ndle USE TWICE DAILY 500 each 12    blood sugar diagnostic (BLOOD GLUCOSE TEST) Strp Use to check blood glucose 3 times daily. Device is Accucheck. 100 each 11    blood-glucose meter Misc Use ad directed (Accu Check Beverly smartview meter) 1 each 4    clopidogrel (PLAVIX) 75 mg tablet TAKE 1 TABLET ONE TIME DAILY 90 tablet 3    cycloSPORINE (RESTASIS) 0.05 % ophthalmic emulsion Place 0.4 mLs (1 drop total) into both eyes 2 (two) times daily. 180 vial 12    insulin aspart " "(NOVOLOG FLEXPEN) 100 unit/mL InPn pen Inject 4/6/6 units into skin with correction scale 150-200 +1, 201-250 +2, 251-300 +3, 301-350 +4, >350 +5. 6 mL 4    LEVEMIR FLEXTOUCH 100 unit/mL (3 mL) InPn pen INJECT 35 UNITS INTO THE SKIN EVERY EVENING. 30 mL 4    losartan (COZAAR) 50 MG tablet TAKE 1 TABLET ONE TIME DAILY 90 tablet 12    multivitamin (CHEWABLE MULTI VITAMIN) Chew Take 2 tablets by mouth once daily.       naproxen (NAPROSYN) 500 MG tablet Take 1 tablet (500 mg total) by mouth 2 (two) times daily with meals. 11 tablet 0    NOVOFINE 32 32 gauge x 1/4" Ndle USE WITH LEVEMIR PEN EVERY EVENING 100 each 3    omeprazole (PRILOSEC) 40 MG capsule Take 40 mg by mouth once daily at 6am.      pantoprazole (PROTONIX) 40 MG tablet Take 1 tablet (40 mg total) by mouth once daily. 30 tablet 3    PROCTOZONE-HC 2.5 % rectal cream Place rectally 2 (two) times daily. 90 g 12    TRUEPLUS LANCETS 28 gauge Misc USE  TO CHECK BLOOD SUGAR THREE TIMES DAILY 300 each 0    vitamin D 1000 units Tab Take 1 tablet (1,000 Units total) by mouth once daily.      acetaminophen (TYLENOL) 500 MG tablet Take 1 tablet (500 mg total) by mouth every 4 (four) hours as needed for Pain.  0    amitriptyline (ELAVIL) 10 MG tablet Take 10 mg by mouth once daily.       cyanocobalamin 1,000 mcg/mL injection INJECT 1 ML UNDER THE SKIN EVERY 2 WEEKS (Patient taking differently: INJECT 1 ML UNDER THE SKIN EVERY 3 WEEKS) 6 mL 10    triamcinolone acetonide 0.1% (KENALOG) 0.1 % cream Apply topically 2 (two) times daily. 45 g 3    UNABLE TO FIND Places uses Wellpatch daily      VOLTAREN 1 % Gel          Past Surgical History:   Procedure Laterality Date    APPENDECTOMY      CATARACT EXTRACTION W/  INTRAOCULAR LENS IMPLANT Left 10/16/14    OS ()    CHOLECYSTECTOMY      COLECTOMY      eyelid surgery      HYSTERECTOMY      PARTIAL GLOSSECTOMY  2010    SELECTIVE NECK DISSECTION  2010    tonsilectomy      TONSILLECTOMY   "       Social History     Social History    Marital status:      Spouse name: N/A    Number of children: N/A    Years of education: N/A     Occupational History    Not on file.     Social History Main Topics    Smoking status: Never Smoker    Smokeless tobacco: Never Used    Alcohol use No    Drug use: No    Sexual activity: No     Other Topics Concern    Are You Pregnant Or Think You May Be? No    Breast-Feeding No     Social History Narrative    She is . She lives on the [facility].         Vital Signs Range (Last 24H):  Temp:  [36.3 °C (97.4 °F)-37.2 °C (98.9 °F)]   Pulse:  [72-95]   Resp:  [16-17]   BP: (137-151)/(60-67)   SpO2:  [92 %-96 %]       CBC:   Recent Labs      07/09/17   1111  07/10/17   0439  07/10/17   0929  07/10/17   1509   WBC  11.00  6.64   --    --    RBC  4.02  3.44*   --    --    HGB  10.2*  8.7*  8.7*  8.8*   HCT  32.9*  28.3*  28.3*  28.2*   PLT  348  287   --    --    MCV  82  82   --    --    MCH  25.4*  25.3*   --    --    MCHC  31.0*  30.7*   --    --        CMP:   Recent Labs      07/09/17   1111  07/10/17   0439   NA  140  142   K  4.2  3.4*   CL  102  104   CO2  28  28   BUN  23  13   CREATININE  0.9  0.7   GLU  148*  106   MG   --   1.2*   PHOS   --   4.0   CALCIUM  10.1  9.2   ALBUMIN  3.5  3.1*   PROT  7.1  6.1   ALKPHOS  67  53*   ALT  13  9*   AST  24  18   BILITOT  0.4  0.4       INR  Recent Labs      07/09/17   1111   INR  1.0   APTT  <21.0           Diagnostic Studies:      EKG:  Sinus rhythm with sinus arrhythmia with 1st degree A-V block  Low voltage QRS  Incomplete right bundle branch block  Abnormal ECG  When compared with ECG of 08-JUN-2015 11:12,  No significant change was found  Confirmed by GISSELLE OATES, INO ([id number]) on 7/9/2017 12:50:27 PM    2D Echo:  CONCLUSIONS     1 - Mild aortic regurgitation.     2 - Normal left ventricular function (EF 65%).     3 - Diastolic dysfunction.     4 - Severe left atrial enlargement.     5 - Trivial mitral  regurgitation.     6 - Normal right ventricular function .     7 - Mild tricuspid regurgitation.     This document has been electronically    SIGNED BY: Waqar Keys M.D. On: 05/07/2013 15:31    PET Stress Test 5/20/13:  EKG Conclusions:    1. The EKG portion of this study is negative for ischemia at a peak heart rate of 67 bpm (48% of predicted).   2. Blood pressure remained stable throughout the protocol  (Presenting BP: 141/54 Peak BP: 131/50).   3. No significant arrhythmias were present.   4. There were no symptoms of chest discomfort or significant dyspnea throughout the protocol.     CONCLUSIONS: NORMAL MYOCARDIAL PERFUSION PET STRESS TEST  1. The perfusion scan is free of evidence for myocardial ischemia or injury.   2. Resting wall motion is physiologic. Stress wall motion is physiologic.   3. LV function is normal at rest and stress.  (normal is >= 51%)  4. The ventricular volumes are normal at rest and stress.   5. The extracardiac distribution of radioactivity is normal.   6. There was no previous study available to compare.      This document has been electronically    SIGNED BY: Harsha Finney M.D. On: 05/20/2013 16:59  Anesthesia Evaluation    I have reviewed the Patient Summary Reports.    I have reviewed the Nursing Notes.   I have reviewed the Medications.     Review of Systems  Anesthesia Hx:  No problems with previous Anesthesia  History of prior surgery of interest to airway management or planning: (partial glossectomy and neck dissection 2010) Previous anesthesia: MAC  2/7/13 colonoscopy with MAC.  Procedure performed at an [facility]. Denies Family Hx of Anesthesia complications.   Denies Personal Hx of Anesthesia complications.   Social:  Denies Tobacco Use. Denies Alcohol Use.  Denies Illicit Drug Use.  Hematology/Oncology:         -- Anemia: -- Lymphoma: in remission, s/p chemotherapy and s/p radiation Rx  Hematology Comments: Osteopenia    --  Cancer in past history: s/p surgery    Other (see Oncology comments) chemotherapy, radiation and surgery    EENT/Dental:   Eyes: Visual Impairment  Denies ear symptoms  Nasal Symptoms:  Denies Throat Symptoms  Denies Active Dental Problems  Denies Jaw Problems   Cardiovascular:   Aortic aneurysm Functional Capacity low / < 4 METS, walks with walker daily activities can walk around grocery without SOB or CP  Hypertension , Recent typical home B/P of 125-130/69-70   Pulmonary:   Denies Shortness of breath.  Denies Recent URI.    Renal/:   Recent UTI treated with bactrim 10/1/14   Hepatic/GI:   GERD (prilosec daily), well controlled    Musculoskeletal:   Arthritis (hands , back )   Musculoskeletal General/Symptoms: muscle weakness, generalized, low back pain. Functional capacity is ambulatory with walker.  Bone Disorders: Osteoporosis    Neurological:   CVA (left side weakness), residual symptoms    Endocrine:   Diabetes, well controlled, type 2  Diabetes, Type 2 Diabetes for 25 years , controlled by insulin. Typical AM glucose range: 70-89 , most recent HgA1c value was 7.8 on 12/4/13.  Metabolic Disorders, Hyperlipoproteinemia  Dermatological:   Skin irritation right RLE healing wound treated with Bactrim       Physical Exam  General:  Well nourished    Airway/Jaw/Neck:  Airway Findings: Mouth Opening: Normal Tongue: Decreased Mobility  Mallampati: II  Improves to II with phonation.  TM Distance: Normal, at least 6 cm      Dental:  Dental Findings: In tact   Chest/Lungs:  Chest/Lungs Findings: Clear to auscultation, Normal Respiratory Rate     Heart/Vascular:  Heart Findings: Rate: Normal  Rhythm: Regular Rhythm  Sounds: Normal             Anesthesia Plan  Type of Anesthesia, risks & benefits discussed:  Anesthesia Type:  MAC  Patient's Preference: mac  Intra-op Monitoring Plan:   Intra-op Monitoring Plan Comments:   Post Op Pain Control Plan:   Post Op Pain Control Plan Comments:   Induction:   IV  Beta Blocker:  Patient is not currently on a  Beta-Blocker (No further documentation required).       Informed Consent: Patient understands risks and agrees with Anesthesia plan.  Questions answered.   ASA Score: 3     Day of Surgery Review of History & Physical: I have interviewed and examined the patient. I have reviewed the patient's H&P dated:  There are no significant changes.  H&P update referred to the surgeon.         Ready For Surgery From Anesthesia Perspective.

## 2017-07-10 NOTE — ASSESSMENT & PLAN NOTE
- 3 episodes of black emesis  - Hgb 10.2 on admit; at baseline. Guaiac positive in ED  - GI consulted in the ED; awaiting recs  - Received IV protonix in the ED  - NPO at [facility]  7/10: Hgb 10.2->8.7. H/H q6 hours; blood consent signed and placed in chart. Per GI, CLD and NPO at [facility] for EGD tomorrow. Will continue Protonix 40 mg BID per recs.

## 2017-07-10 NOTE — CONSULTS
"[facility]  Gastroenterology  Consult Note    Patient Name: Marina Trimble  MRN: [id number]  Admission Date: 7/9/2017  Hospital Length of Stay: 0 days  Code Status: Full Code   Attending Provider: Francisco Rowan MD   Consulting Provider: Roopa Ramirez MD  Primary Care Physician: Jose Jimenez MD  Principal Problem:Gastrointestinal hemorrhage    Inpatient consult to Gastroenterology  Consult performed by: ROOPA RAMIREZ  Consult ordered by: FRANCISCO ROWAN        Subjective:     HPI:  Marina Trimble is a 81 y.o. F with HTN, DMII, TIA in 2013 (currently on Plavix), baseline anemia (hx of melanoma and lymphoma), and GERD who presented to the ED yesterday 07/09/2017 after 3 episodes of large volume black emesis. Patient endorses having sub-sternal CP and SOB that began the night before and was more intense than her normal GERD related pain. She states that the pain was constant in quality with intermittent worsening. She denies melena or any change in her bowel habits. She states that she has been taking Tylenol 500mg BID for the past month due to worsening pain from a hip fracture 6 months ago. She states that she takes a shot every 3 weeks for her anemia, but cannot recall the name (I mentioned procrit and epo ) of the medication. Patient also has a remote history of a colectomy 15-16 years ago, but cannot recall the reason. She states "I was having to go all the time and I had a block in my colon so they had to take out 11 inches. Then it wouldn't heal, but everything is ok now."    Past Medical History:   Diagnosis Date    Anemia     Aneurysm of aorta     Back pain     Cataract     Diabetes mellitus     sees Endocrine yearly ??   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    Melanoma 2011    melanoma in situ of R nasal tip, excised with FTSG and treated with Aldara    MGUS (monoclonal gammopathy of unknown significance)     Dr Hodges    Osteopenia     Senile osteoporosis     Skin cancer     Skin neoplasm     Stroke     left weak -     Tongue cancer 2010    [id number]    Trouble in sleeping     Urinary incontinence     Vitamin B 12 deficiency     Vitamin B12 deficiency     Vitamin K deficiency        Past Surgical History:   Procedure Laterality Date    APPENDECTOMY      CATARACT EXTRACTION W/  INTRAOCULAR LENS IMPLANT Left 10/16/14    OS ()    CHOLECYSTECTOMY      COLECTOMY      eyelid surgery      HYSTERECTOMY      PARTIAL GLOSSECTOMY  2010    SELECTIVE NECK DISSECTION  2010    tonsilectomy      TONSILLECTOMY         Review of patient's allergies indicates:   Allergen Reactions    Baclofen Nausea And Vomiting and Other (See Comments)     Dizziness    Benazepril      Other reaction(s): Rash,Swelling    Carisoprodol      Other reaction(s): Dysphoria    Desloratadine      Other reaction(s): Nausea  Other reaction(s): Nausea    Hydrocodone      Other reaction(s): disorientation    Methocarbamol      Other reaction(s): Nausea    Omeprazole Nausea And Vomiting    Oxybutynin      Other reaction(s): Hallucinations    Oxycodone Nausea And Vomiting    Tramadol      Other reaction(s): dizziness    Benazepril-hydrochlorothiazide Rash     Other reaction(s): Swelling    Metformin Diarrhea    Prednisone Rash     Family History     Problem Relation (Age of Onset)    COPD     Coronary artery disease         Social History Main Topics    Smoking status: Never Smoker    Smokeless tobacco: Never Used    Alcohol use No    Drug  use: No    Sexual activity: No     Review of Systems   Constitutional: Negative for activity change and appetite change.   HENT: Negative for congestion and facial swelling.    Eyes: Negative for discharge and itching.   Respiratory: Negative for apnea and shortness of breath.    Cardiovascular: Negative for chest pain and palpitations.   Gastrointestinal: Positive for vomiting. Negative for abdominal distention, abdominal pain, anal bleeding and blood in stool.   Endocrine: Positive for polyuria. Negative for cold intolerance and heat intolerance.   Genitourinary: Positive for enuresis. Negative for difficulty urinating and dysuria.   Skin: Negative for color change and pallor.   Neurological: Negative for dizziness and headaches.     Objective:     Vital Signs (Most Recent):  Temp: 97.4 °F (36.3 °C) (07/10/17 0350)  Pulse: 74 (07/10/17 0700)  Resp: 16 (07/10/17 0350)  BP: 138/62 (07/10/17 0350)  SpO2: (!) 92 % (07/10/17 0350) Vital Signs (24h Range):  Temp:  [97.4 °F (36.3 °C)-98.3 °F (36.8 °C)] 97.4 °F (36.3 °C)  Pulse:  [72-95] 74  Resp:  [16-27] 16  SpO2:  [92 %-98 %] 92 %  BP: (137-172)/(60-95) 138/62     Weight: 61.7 kg (136 lb) (07/09/17 1023)  Body mass index is 27.47 kg/m².      Intake/Output Summary (Last 24 hours) at 07/10/17 0843  Last data filed at 07/09/17 2300   Gross per 24 hour   Intake              480 ml   Output                0 ml   Net              480 ml       Lines/Drains/Airways     Peripheral Intravenous Line                 Peripheral IV - Single Lumen 07/09/17 1055 Right Antecubital less than 1 day                Physical Exam   Constitutional: She is oriented to person, place, and time. She appears well-developed and well-nourished.   HENT:   Head: Normocephalic and atraumatic.   Mouth/Throat: Oropharynx is clear and moist and mucous membranes are normal.       Eyes: Conjunctivae, EOM and lids are normal.   Neck: No neck rigidity. No edema and normal range of motion present.  "  Cardiovascular: Normal rate, regular rhythm and normal heart sounds.    Pulmonary/Chest: Effort normal and breath sounds normal.   Abdominal: Soft. Normal appearance and bowel sounds are normal. There is no hepatosplenomegaly. There is tenderness in the epigastric area. There is no guarding and negative Foley's sign.   Neurological: She is alert and oriented to person, place, and time.   Skin: Skin is warm, dry and intact.   Psychiatric: She has a normal mood and affect. Her speech is normal and behavior is normal.       Significant Labs:  CBC:   Recent Labs  Lab 07/09/17  1111 07/10/17  0439   WBC 11.00 6.64   HGB 10.2* 8.7*   HCT 32.9* 28.3*    287     CMP:   Recent Labs  Lab 07/10/17  0439      CALCIUM 9.2   ALBUMIN 3.1*   PROT 6.1      K 3.4*   CO2 28      BUN 13   CREATININE 0.7   ALKPHOS 53*   ALT 9*   AST 18   BILITOT 0.4     Coagulation:   Recent Labs  Lab 07/09/17  1111   INR 1.0   APTT <21.0       Significant Imaging:  CXR AP portable 07/09/2017  No detrimental change since 12/25/16.    Colonoscopy 02/07/2013  Impression:             - Non-thrombosed external hemorrhoids found on perianal exam.  - Patent end-to-side ileo-colonic anastomosis.  - Internal hemorrhoids.  - Diverticulosis in the ascending colon.            Assessment/Plan:     * Gastrointestinal hemorrhage    Marina Trimble is a 81 y.o. F who presented to the ED yesterday after 3 epiosdes of black emesis a/w SOB and worsenign substernal CP described as normal reflux related pain, but more severe, since the prior evening. Patient has baseline anemia since treatment for melanoma and lymphoma in 2012. Takes "shots for her blood count" every 3 weeks. (unable to determine the med via chart review.) Has not had additional episodes of emesis since admission    -GARETH negative for melena or deion blood in the rectal vault  -Recommend serial H/H to monitor for additional bleeds  -Plavix has been d/c'd  -Continue Protonix " 40mg BID  -Recommend liquid diet until midnight, NPO thereafter for EGD tomorrow             Thank you for your consult. I will follow-up with patient. Please contact us if you have any additional questions.    Tez Doyle MD  Internal Medicine PGY-1  [facility]

## 2017-07-10 NOTE — SUBJECTIVE & OBJECTIVE
Interval History: Pt had no acute events overnight. This AM, pt lying in bed resting comfortably. Pt denies N/V and BMs since admit. Pt c/o hunger 2/2 NPO status. Discussed plan of care with patient.    Review of Systems   Constitutional: Negative for chills, diaphoresis, fatigue and fever.   Respiratory: Negative for chest tightness, shortness of breath and wheezing.    Cardiovascular: Negative for chest pain, palpitations and leg swelling.   Gastrointestinal: Positive for abdominal pain (mild; generalized) and vomiting. Negative for abdominal distention, diarrhea and nausea.   Musculoskeletal: Positive for gait problem (2/2 CVA ). Negative for back pain, joint swelling, myalgias and neck pain.   Neurological: Positive for speech difficulty (at baseline 2/2 tongue ressection). Negative for dizziness, seizures, facial asymmetry and numbness.     Objective:     Vital Signs (Most Recent):  Temp: 98.9 °F (37.2 °C) (07/10/17 1519)  Pulse: 78 (07/10/17 1519)  Resp: 17 (07/10/17 1519)  BP: (!) 151/66 (07/10/17 1519)  SpO2: (!) 94 % (07/10/17 1519) Vital Signs (24h Range):  Temp:  [97.4 °F (36.3 °C)-98.9 °F (37.2 °C)] 98.9 °F (37.2 °C)  Pulse:  [72-95] 78  Resp:  [16-18] 17  SpO2:  [92 %-96 %] 94 %  BP: (137-151)/(60-68) 151/66     Weight: 61.7 kg (136 lb)  Body mass index is 27.47 kg/m².    Intake/Output Summary (Last 24 hours) at 07/10/17 1537  Last data filed at 07/09/17 2300   Gross per 24 hour   Intake              480 ml   Output                0 ml   Net              480 ml      Physical Exam   Constitutional: She is oriented to person, place, and time. She appears well-developed and well-nourished. No distress.   HENT:   S/p L partial glossectomy    Cardiovascular: Normal rate, regular rhythm, normal heart sounds and intact distal pulses.    No murmur heard.  Pulmonary/Chest: Effort normal and breath sounds normal. No respiratory distress. She has no wheezes.   Abdominal: Soft. Bowel sounds are normal. She exhibits  no distension. There is no tenderness.   Mild TTP   Musculoskeletal: Normal range of motion. She exhibits no edema.   Neurological: She is alert and oriented to person, place, and time. No cranial nerve deficit.   Skin: Skin is warm and dry. She is not diaphoretic. No erythema.   Psychiatric: She has a normal mood and affect. Her behavior is normal.   Nursing note and vitals reviewed.      Significant Labs: All pertinent labs within the past 24 hours have been reviewed.    Significant Imaging: I have reviewed all pertinent imaging results/findings within the past 24 hours.

## 2017-07-11 ENCOUNTER — SURGERY (OUTPATIENT)
Age: 82
End: 2017-07-11

## 2017-07-11 ENCOUNTER — ANESTHESIA (OUTPATIENT)
Dept: ENDOSCOPY | Facility: HOSPITAL | Age: 82
End: 2017-07-11
Payer: MEDICARE

## 2017-07-11 PROBLEM — R07.9 CHEST PAIN: Status: RESOLVED | Noted: 2017-07-09 | Resolved: 2017-07-11

## 2017-07-11 LAB
ALBUMIN SERPL BCP-MCNC: 3.2 G/DL
ALP SERPL-CCNC: 52 U/L
ALT SERPL W/O P-5'-P-CCNC: 9 U/L
ANION GAP SERPL CALC-SCNC: 10 MMOL/L
AST SERPL-CCNC: 22 U/L
BASOPHILS # BLD AUTO: 0.03 K/UL
BASOPHILS NFR BLD: 0.5 %
BILIRUB SERPL-MCNC: 0.5 MG/DL
BUN SERPL-MCNC: 11 MG/DL
CALCIUM SERPL-MCNC: 9 MG/DL
CHLORIDE SERPL-SCNC: 105 MMOL/L
CO2 SERPL-SCNC: 27 MMOL/L
CREAT SERPL-MCNC: 0.7 MG/DL
DIFFERENTIAL METHOD: ABNORMAL
EOSINOPHIL # BLD AUTO: 0.3 K/UL
EOSINOPHIL NFR BLD: 4 %
ERYTHROCYTE [DISTWIDTH] IN BLOOD BY AUTOMATED COUNT: 14.9 %
EST. GFR  (AFRICAN AMERICAN): >60 ML/MIN/1.73 M^2
EST. GFR  (NON AFRICAN AMERICAN): >60 ML/MIN/1.73 M^2
GLUCOSE SERPL-MCNC: 112 MG/DL
HCT VFR BLD AUTO: 28.1 %
HCT VFR BLD AUTO: 30.1 %
HCT VFR BLD AUTO: 30.8 %
HCT VFR BLD AUTO: 31.1 %
HGB BLD-MCNC: 8.9 G/DL
HGB BLD-MCNC: 9.3 G/DL
HGB BLD-MCNC: 9.6 G/DL
HGB BLD-MCNC: 9.7 G/DL
LYMPHOCYTES # BLD AUTO: 1.7 K/UL
LYMPHOCYTES NFR BLD: 26.3 %
MAGNESIUM SERPL-MCNC: 1.3 MG/DL
MCH RBC QN AUTO: 26 PG
MCHC RBC AUTO-ENTMCNC: 31.7 %
MCV RBC AUTO: 82 FL
MONOCYTES # BLD AUTO: 0.5 K/UL
MONOCYTES NFR BLD: 8.6 %
NEUTROPHILS # BLD AUTO: 3.8 K/UL
NEUTROPHILS NFR BLD: 60.4 %
PHOSPHATE SERPL-MCNC: 3.3 MG/DL
PLATELET # BLD AUTO: 291 K/UL
PMV BLD AUTO: 9.9 FL
POCT GLUCOSE: 116 MG/DL (ref 70–110)
POCT GLUCOSE: 123 MG/DL (ref 70–110)
POCT GLUCOSE: 126 MG/DL (ref 70–110)
POCT GLUCOSE: 148 MG/DL (ref 70–110)
POCT GLUCOSE: 150 MG/DL (ref 70–110)
POTASSIUM SERPL-SCNC: 3.8 MMOL/L
PROT SERPL-MCNC: 6.2 G/DL
RBC # BLD AUTO: 3.42 M/UL
SODIUM SERPL-SCNC: 142 MMOL/L
WBC # BLD AUTO: 6.31 K/UL

## 2017-07-11 PROCEDURE — 27201012 HC FORCEPS, HOT/COLD, DISP: Performed by: INTERNAL MEDICINE

## 2017-07-11 PROCEDURE — 88305 TISSUE EXAM BY PATHOLOGIST: CPT | Mod: 26,,, | Performed by: PATHOLOGY

## 2017-07-11 PROCEDURE — 25000003 PHARM REV CODE 250: Performed by: NURSE ANESTHETIST, CERTIFIED REGISTERED

## 2017-07-11 PROCEDURE — D9220A PRA ANESTHESIA: Mod: ANES,,, | Performed by: ANESTHESIOLOGY

## 2017-07-11 PROCEDURE — 25000003 PHARM REV CODE 250: Performed by: PHYSICIAN ASSISTANT

## 2017-07-11 PROCEDURE — 63600175 PHARM REV CODE 636 W HCPCS: Performed by: NURSE ANESTHETIST, CERTIFIED REGISTERED

## 2017-07-11 PROCEDURE — G0378 HOSPITAL OBSERVATION PER HR: HCPCS

## 2017-07-11 PROCEDURE — 37000008 HC ANESTHESIA 1ST 15 MINUTES: Performed by: INTERNAL MEDICINE

## 2017-07-11 PROCEDURE — 85025 COMPLETE CBC W/AUTO DIFF WBC: CPT

## 2017-07-11 PROCEDURE — 43239 EGD BIOPSY SINGLE/MULTIPLE: CPT | Mod: GC,,, | Performed by: INTERNAL MEDICINE

## 2017-07-11 PROCEDURE — 80053 COMPREHEN METABOLIC PANEL: CPT

## 2017-07-11 PROCEDURE — 43239 EGD BIOPSY SINGLE/MULTIPLE: CPT | Performed by: INTERNAL MEDICINE

## 2017-07-11 PROCEDURE — 84100 ASSAY OF PHOSPHORUS: CPT

## 2017-07-11 PROCEDURE — 85014 HEMATOCRIT: CPT

## 2017-07-11 PROCEDURE — 88305 TISSUE EXAM BY PATHOLOGIST: CPT | Performed by: PATHOLOGY

## 2017-07-11 PROCEDURE — 25000003 PHARM REV CODE 250: Performed by: INTERNAL MEDICINE

## 2017-07-11 PROCEDURE — 37000009 HC ANESTHESIA EA ADD 15 MINS: Performed by: INTERNAL MEDICINE

## 2017-07-11 PROCEDURE — 82962 GLUCOSE BLOOD TEST: CPT | Performed by: INTERNAL MEDICINE

## 2017-07-11 PROCEDURE — 99225 PR SUBSEQUENT OBSERVATION CARE,LEVEL II: CPT | Mod: ,,, | Performed by: PHYSICIAN ASSISTANT

## 2017-07-11 PROCEDURE — 85018 HEMOGLOBIN: CPT

## 2017-07-11 PROCEDURE — D9220A PRA ANESTHESIA: Mod: CRNA,,, | Performed by: NURSE ANESTHETIST, CERTIFIED REGISTERED

## 2017-07-11 PROCEDURE — 36415 COLL VENOUS BLD VENIPUNCTURE: CPT

## 2017-07-11 PROCEDURE — 83735 ASSAY OF MAGNESIUM: CPT

## 2017-07-11 RX ORDER — NAPROXEN SODIUM 220 MG/1
81 TABLET, FILM COATED ORAL DAILY
Status: DISCONTINUED | OUTPATIENT
Start: 2017-07-11 | End: 2017-07-12 | Stop reason: HOSPADM

## 2017-07-11 RX ORDER — CLOPIDOGREL BISULFATE 75 MG/1
75 TABLET ORAL DAILY
Status: DISCONTINUED | OUTPATIENT
Start: 2017-07-12 | End: 2017-07-12

## 2017-07-11 RX ORDER — SODIUM CHLORIDE 9 MG/ML
INJECTION, SOLUTION INTRAVENOUS CONTINUOUS
Status: DISCONTINUED | OUTPATIENT
Start: 2017-07-11 | End: 2017-07-12 | Stop reason: HOSPADM

## 2017-07-11 RX ORDER — GLYCOPYRROLATE 0.2 MG/ML
INJECTION INTRAMUSCULAR; INTRAVENOUS
Status: DISCONTINUED | OUTPATIENT
Start: 2017-07-11 | End: 2017-07-11

## 2017-07-11 RX ORDER — PROPOFOL 10 MG/ML
VIAL (ML) INTRAVENOUS
Status: DISCONTINUED | OUTPATIENT
Start: 2017-07-11 | End: 2017-07-11

## 2017-07-11 RX ORDER — SODIUM CHLORIDE 0.9 % (FLUSH) 0.9 %
3 SYRINGE (ML) INJECTION
Status: DISCONTINUED | OUTPATIENT
Start: 2017-07-11 | End: 2017-07-12 | Stop reason: HOSPADM

## 2017-07-11 RX ORDER — LIDOCAINE HCL/PF 100 MG/5ML
SYRINGE (ML) INTRAVENOUS
Status: DISCONTINUED | OUTPATIENT
Start: 2017-07-11 | End: 2017-07-11

## 2017-07-11 RX ADMIN — ASPIRIN 81 MG CHEWABLE TABLET 81 MG: 81 TABLET CHEWABLE at 06:07

## 2017-07-11 RX ADMIN — PROPOFOL 50 MG: 10 INJECTION, EMULSION INTRAVENOUS at 11:07

## 2017-07-11 RX ADMIN — Medication 3 ML: at 10:07

## 2017-07-11 RX ADMIN — PANTOPRAZOLE SODIUM 40 MG: 40 TABLET, DELAYED RELEASE ORAL at 01:07

## 2017-07-11 RX ADMIN — VITAMIN D, TAB 1000IU (100/BT) 1000 UNITS: 25 TAB at 01:07

## 2017-07-11 RX ADMIN — ATORVASTATIN CALCIUM 40 MG: 20 TABLET, FILM COATED ORAL at 01:07

## 2017-07-11 RX ADMIN — AMLODIPINE BESYLATE 5 MG: 5 TABLET ORAL at 01:07

## 2017-07-11 RX ADMIN — SODIUM CHLORIDE: 0.9 INJECTION, SOLUTION INTRAVENOUS at 10:07

## 2017-07-11 RX ADMIN — Medication 3 ML: at 02:07

## 2017-07-11 RX ADMIN — LIDOCAINE HYDROCHLORIDE 40 MG: 20 INJECTION, SOLUTION INTRAVENOUS at 11:07

## 2017-07-11 RX ADMIN — GLYCOPYRROLATE 0.2 MG: 0.2 INJECTION, SOLUTION INTRAMUSCULAR; INTRAVENOUS at 11:07

## 2017-07-11 RX ADMIN — Medication 3 ML: at 06:07

## 2017-07-11 RX ADMIN — LOSARTAN POTASSIUM 50 MG: 50 TABLET, FILM COATED ORAL at 01:07

## 2017-07-11 NOTE — TREATMENT PLAN
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  Gastroenterology Fellow, PGY-IV  Pager: [phone]  [facility]

## 2017-07-11 NOTE — PATIENT INSTRUCTIONS
Discharge Summary/Instructions after an Endoscopic Procedure  Patient Name: Marina Trimble  Patient MRN: [id number]  Patient YOB: 1935  Tuesday, July 11, 2017  Claus Ram MD  RESTRICTIONS ON ACTIVITY:  - DO NOT drive a car, operate machinery, make legal/financial decisions, or   drink alcohol until the day after the procedure.    - The following day: return to full activity including work, except no heavy   lifting, straining or running for 3 days if polyps were removed.  - Diet: Eat and drink normally unless instructed otherwise.  TREATMENT FOR COMMON SIDE EFFECTS:  - Mild abdominal pain, bloating or excessive gas: rest, eat lightly and use   a heating pad.  - Sore Throat - treat with throat lozenges. Gargle with warm salt water.  SYMPTOMS TO WATCH FOR AND REPORT TO YOUR PHYSICIAN:  1. Severe abdominal pain or bloating.  2. Pain in chest.  3. Chills or fever occurring within 24 hours after a procedure.  4. A large amount of rectal bleeding, which would show as bright red,   maroon, or black stools. (A small amount of blood from the rectum is not   serious, especially if hemorrhoids are present.)  5. Because air was used during the procedure, expelling large amounts of air   from your rectum or belching is normal.  6. If a bowel prep was taken, you may not have a bowel movement for 1-3   days.  This is normal.  7. Go directly to the emergency room if you notice any of the following:   Chills and/or fever over 101 F   Persistent vomiting   Severe abdominal pain, other than gas cramps   Severe chest pain   Black, tarry stools   Any bleeding - exceeding one tablespoon  Your doctor recommends these additional instructions:  If any biopsies were performed, my office will call you in 5 to 6 business   days with any results.  We are waiting for your pathology results.   Resume your previous diet today.   Your physician has recommended a CT scan (computed tomography) of the   abdomen with contrast at  appointment to be scheduled.  For questions, problems or results please call your physician - Claus Ram MD at Work:  [phone].  [facility], EMERGENCY ROOM PHONE NUMBER: [phone]  IF A COMPLICATION OR EMERGENCY SITUATION ARISES AND YOU ARE UNABLE TO REACH   YOUR PHYSICIAN - GO TO THE EMERGENCY ROOM.  Claus Ram MD  7/11/2017 11:59:37 AM  This report has been verified and signed electronically.

## 2017-07-11 NOTE — ANESTHESIA POSTPROCEDURE EVALUATION
"Anesthesia Post Evaluation    Patient: Marina Trimble    Procedure(s) Performed: Procedure(s) (LRB):  ESOPHAGOGASTRODUODENOSCOPY (EGD) (N/A)    Final Anesthesia Type: general  Patient location during evaluation: PACU  Patient participation: Yes- Able to Participate  Level of consciousness: awake and alert  Post-procedure vital signs: reviewed and stable  Pain management: adequate  Airway patency: patent  PONV status at discharge: No PONV  Anesthetic complications: no      Cardiovascular status: hemodynamically stable  Respiratory status: room air, spontaneous ventilation and unassisted  Hydration status: euvolemic  Follow-up not needed.        Visit Vitals  BP (!) 135/59 (BP Location: Left arm, Patient Position: Lying, BP Method: Automatic)   Pulse (!) 118   Temp 37.1 °C (98.8 °F) (Oral)   Resp 18   Ht 4' 11" (1.499 m)   Wt 61.7 kg (136 lb)   SpO2 (!) 94%   Breastfeeding? No   BMI 27.47 kg/m²       Pain/Fransico Score: Pain Assessment Performed: Yes (7/11/2017 11:41 AM)  Presence of Pain: non-verbal indicators absent (7/11/2017 11:41 AM)  Fransico Score: 8 (7/11/2017 11:41 AM)      "

## 2017-07-11 NOTE — SUBJECTIVE & OBJECTIVE
Interval History: Pt had no acute events overnight. Pt seen before and after EGD. Pt reports minimal nausea s/p EGD; discussed findings of EGD and plan for CT abd/pelvis. Patient and family voiced understanding.    Review of Systems   Constitutional: Negative for chills, diaphoresis, fatigue and fever.   Respiratory: Negative for chest tightness, shortness of breath and wheezing.    Cardiovascular: Negative for chest pain, palpitations and leg swelling.   Gastrointestinal: Positive for abdominal pain (mild; generalized) and nausea. Negative for abdominal distention, diarrhea and vomiting.   Musculoskeletal: Positive for gait problem (2/2 CVA ). Negative for back pain, joint swelling, myalgias and neck pain.   Neurological: Positive for speech difficulty (at baseline 2/2 tongue ressection). Negative for dizziness, seizures, facial asymmetry and numbness.     Objective:     Vital Signs (Most Recent):  Temp: 98 °F (36.7 °C) (07/11/17 1458)  Pulse: 100 (07/11/17 1500)  Resp: 15 (07/11/17 1458)  BP: 139/63 (07/11/17 1458)  SpO2: 95 % (07/11/17 1458) Vital Signs (24h Range):  Temp:  [97.8 °F (36.6 °C)-99 °F (37.2 °C)] 98 °F (36.7 °C)  Pulse:  [] 100  Resp:  [15-20] 15  SpO2:  [93 %-96 %] 95 %  BP: (118-151)/(56-69) 139/63     Weight: 61.7 kg (136 lb)  Body mass index is 27.47 kg/m².    Intake/Output Summary (Last 24 hours) at 07/11/17 1751  Last data filed at 07/11/17 1128   Gross per 24 hour   Intake              300 ml   Output                0 ml   Net              300 ml      Physical Exam   Constitutional: She is oriented to person, place, and time. She appears well-developed and well-nourished. No distress.   HENT:   S/p L partial glossectomy    Cardiovascular: Normal rate, regular rhythm, normal heart sounds and intact distal pulses.    No murmur heard.  Pulmonary/Chest: Effort normal and breath sounds normal. No respiratory distress. She has no wheezes.   Abdominal: Soft. Bowel sounds are normal. She  exhibits no distension. There is no tenderness.   Mild TTP   Musculoskeletal: Normal range of motion. She exhibits no edema.   Neurological: She is alert and oriented to person, place, and time. No cranial nerve deficit.   Skin: Skin is warm and dry. She is not diaphoretic. No erythema.   Psychiatric: She has a normal mood and affect. Her behavior is normal.   Nursing note and vitals reviewed.      Significant Labs: All pertinent labs within the past 24 hours have been reviewed.    Significant Imaging: I have reviewed all pertinent imaging results/findings within the past 24 hours.

## 2017-07-11 NOTE — NURSING TRANSFER
Nursing Transfer Note      7/11/2017     Transfer From: [facility]    Transfer via stretcher    Transfer with cardiac monitoring    Transported by pct    Medicines sent: n/a    Chart send with patient: Yes    Notified: son    Patient reassessed at: 07/11/2017 at 1225 (date, time)    Upon arrival to floor: cardiac monitor applied, patient oriented to room, call bell in reach and bed in lowest position

## 2017-07-11 NOTE — INTERVAL H&P NOTE
Pre-Procedure H and P Addendum    Patient seen and examined.  History and exam unchanged from prior history and physical.      Procedure: EGD  Indication: Hematemesis  ASA Class: per anesthesiology  Airway: normal  Neck Mobility: full range of motion  Mallampatti score: per anesthesia  History of anesthesia problems: no  Family history of anesthesia problems: no  Anesthesia Plan: MAC    Anesthesia/Surgery risks, benefits and alternative options discussed and understood by patient/family.          Active Hospital Problems    Diagnosis  POA    *Gastrointestinal hemorrhage [K92.2]  Unknown    Chest pain [R07.9]  Yes    Back pain [M54.9]  Yes    History of CVA (cerebrovascular accident) [Z86.73]  Not Applicable    Diabetes mellitus type 2, uncontrolled [E11.65]  Yes    Essential hypertension [I10]  Yes    Hyperlipidemia [E78.5]  Yes    History of tongue cancer [Z85.810]  Yes      Resolved Hospital Problems    Diagnosis Date Resolved POA   No resolved problems to display.

## 2017-07-11 NOTE — PROGRESS NOTES
"[facility] Hospital Medicine  Progress Note    Patient Name: Marina Trimble  MRN: [id number]  Patient Class: OP- Observation   Admission Date: 7/9/2017  Length of Stay: 0 days  Attending Physician: Jennifer Hernandez MD  Primary Care Provider: Jose Jimenez MD    [facility] Medicine Team: [facility] HOSP MED F Claribel Zarco PA-C    Subjective:     Principal Problem:Gastrointestinal hemorrhage    HPI:  81 year old female with a PMHx of HTN, HLD, DM2, CVA with residual L sided weakness (on Plavix), GERD, and tongue cancer s/p partial glossectomy presenting to the ED c/o dark emesis. Pt states she was out of town in [place] with family and started feeling poor yesterday evening from "indigestion." Pt states she had chest pain this morning "in the center of my chest" described as stabbing and "worse than my usual indigestion." Pt reports three episodes of "black" emesis "so my son brought me to the ER." At the time of my exam, pt denies chest pain, SOB, abdominal pain, nausea, diarrhea, fever, chills.       In the ED, pt is hypertensive. Pt is afebrile without leukocytosis. Hgb 10.2 (at baseline). Guaiac positive. Troponin WNL. CXR shows no acute process. EKG without changes. GI consulted in the ED.    Hospital Course:  Pt admitted to observation for GI bleed. GI consulted in the ED; awaiting recs. ASA and Plavix held on admit. NPO after midnight.  7/10: Hgb 10.2->8.7. H/H q6 hours; blood consent signed and placed in chart. Per GI, CLD and NPO at [facility] for EGD tomorrow. Will continue Protonix 40 mg BID per recs.   7/11: H/H stable. EGD shows mucosal changes in the duodenum with stenosis, suspicious for malignancy; biopsied; may be causing slight gastric outlet obstruction; no sources for bleeding seen. GI rec CT abd/pelvis with pancreas protocol with contrast. Resumed ASA today per recs. Plan to resume Plavix tomorrow.    Interval History: Pt had no acute events overnight. Pt seen before and after EGD. " Pt reports minimal nausea s/p EGD; discussed findings of EGD and plan for CT abd/pelvis. Patient and family voiced understanding.    Review of Systems   Constitutional: Negative for chills, diaphoresis, fatigue and fever.   Respiratory: Negative for chest tightness, shortness of breath and wheezing.    Cardiovascular: Negative for chest pain, palpitations and leg swelling.   Gastrointestinal: Positive for abdominal pain (mild; generalized) and nausea. Negative for abdominal distention, diarrhea and vomiting.   Musculoskeletal: Positive for gait problem (2/2 CVA ). Negative for back pain, joint swelling, myalgias and neck pain.   Neurological: Positive for speech difficulty (at baseline 2/2 tongue ressection). Negative for dizziness, seizures, facial asymmetry and numbness.     Objective:     Vital Signs (Most Recent):  Temp: 98 °F (36.7 °C) (07/11/17 1458)  Pulse: 100 (07/11/17 1500)  Resp: 15 (07/11/17 1458)  BP: 139/63 (07/11/17 1458)  SpO2: 95 % (07/11/17 1458) Vital Signs (24h Range):  Temp:  [97.8 °F (36.6 °C)-99 °F (37.2 °C)] 98 °F (36.7 °C)  Pulse:  [] 100  Resp:  [15-20] 15  SpO2:  [93 %-96 %] 95 %  BP: (118-151)/(56-69) 139/63     Weight: 61.7 kg (136 lb)  Body mass index is 27.47 kg/m².    Intake/Output Summary (Last 24 hours) at 07/11/17 1751  Last data filed at 07/11/17 1128   Gross per 24 hour   Intake              300 ml   Output                0 ml   Net              300 ml      Physical Exam   Constitutional: She is oriented to person, place, and time. She appears well-developed and well-nourished. No distress.   HENT:   S/p L partial glossectomy    Cardiovascular: Normal rate, regular rhythm, normal heart sounds and intact distal pulses.    No murmur heard.  Pulmonary/Chest: Effort normal and breath sounds normal. No respiratory distress. She has no wheezes.   Abdominal: Soft. Bowel sounds are normal. She exhibits no distension. There is no tenderness.   Mild TTP   Musculoskeletal: Normal  range of motion. She exhibits no edema.   Neurological: She is alert and oriented to person, place, and time. No cranial nerve deficit.   Skin: Skin is warm and dry. She is not diaphoretic. No erythema.   Psychiatric: She has a normal mood and affect. Her behavior is normal.   Nursing note and vitals reviewed.      Significant Labs: All pertinent labs within the past 24 hours have been reviewed.    Significant Imaging: I have reviewed all pertinent imaging results/findings within the past 24 hours.    Assessment/Plan:      * Gastrointestinal hemorrhage    - 3 episodes of black emesis  - Hgb 10.2 on admit; at baseline. Guaiac positive in ED  - GI consulted in the ED; awaiting recs  - Received IV protonix in the ED  - NPO at [facility]  7/10: Hgb 10.2->8.7. H/H q6 hours; blood consent signed and placed in chart. Per GI, CLD and NPO at [facility] for EGD tomorrow. Will continue Protonix 40 mg BID per recs.   7/11: H/H stable. EGD shows mucosal changes in the duodenum with stenosis, suspicious for malignancy; biopsied; may be causing slight gastric outlet obstruction; no sources for bleeding seen. GI rec CT abd/pelvis with pancreas protocol with contrast.         Diabetes mellitus type 2, uncontrolled    - A1c 8.1  - Cardiac/DM diet  - Low dose SSI. Adjusted insulin regimen; will titrate PRN          Hyperlipidemia    - Continue ASA, lipitor          Essential hypertension    - BP elevated on admit  - Continue home norvasc and losartan  7/10-7/11: BP controlled. Will continue to monitor        Back pain    S/p L pubic fracture; follows with ortho as outpatient  - Continue acetaminophen-codeine PRN  - Pt reports poor tolerance to opioids  7/10: Naproxen held pending EGD results        History of CVA (cerebrovascular accident)    - No deficits on admit  - ASA and plavix held on admit  7/11: Resumed ASA today per recs. Plan to resume Plavix tomorrow.        History of tongue cancer    S/p partial L glossectomy  - follows with ENT as  outpatient  - Dysarthria at baseline  - No changes per patient            VTE Risk Mitigation         Ordered     Reason for No Pharmacological VTE Prophylaxis  Once      07/09/17 1424     High Risk of VTE  Once      07/09/17 1421     Place LAITH hose  Until discontinued      07/09/17 1421     Place sequential compression device  Until discontinued      07/09/17 1421          Claribel Zarco PA-C  Department of Hospital Medicine   [facility]

## 2017-07-11 NOTE — TRANSFER OF CARE
"Anesthesia Transfer of Care Note    Patient: Marina Trimble    Procedure(s) Performed: Procedure(s) (LRB):  ESOPHAGOGASTRODUODENOSCOPY (EGD) (N/A)    Patient location: PACU    Anesthesia Type: general    Transport from OR: Transported from OR on 2-3 L/min O2 by NC with adequate spontaneous ventilation    Post pain: adequate analgesia    Post assessment: no apparent anesthetic complications    Post vital signs: stable    Level of consciousness: awake    Nausea/Vomiting: no nausea/vomiting    Complications: none    Transfer of care protocol was followed      Last vitals:   Visit Vitals  BP (!) 151/69   Pulse 102   Temp 37.1 °C (98.8 °F) (Oral)   Resp 18   Ht 4' 11" (1.499 m)   Wt 61.7 kg (136 lb)   SpO2 95%   Breastfeeding? No   BMI 27.47 kg/m²     "

## 2017-07-11 NOTE — H&P (VIEW-ONLY)
"[facility]  Gastroenterology  Consult Note    Patient Name: Marina Trimble  MRN: [id number]  Admission Date: 7/9/2017  Hospital Length of Stay: 0 days  Code Status: Full Code   Attending Provider: Francisco Rowan MD   Consulting Provider: Roopa Ramirez MD  Primary Care Physician: Joes Jimenez MD  Principal Problem:Gastrointestinal hemorrhage    Inpatient consult to Gastroenterology  Consult performed by: ROOPA RAMIERZ  Consult ordered by: FRANCISCO ROWAN        Subjective:     HPI:  Marina Trimble is a 81 y.o. F with HTN, DMII, TIA in 2013 (currently on Plavix), baseline anemia (hx of melanoma and lymphoma), and GERD who presented to the ED yesterday 07/09/2017 after 3 episodes of large volume black emesis. Patient endorses having sub-sternal CP and SOB that began the night before and was more intense than her normal GERD related pain. She states that the pain was constant in quality with intermittent worsening. She denies melena or any change in her bowel habits. She states that she has been taking Tylenol 500mg BID for the past month due to worsening pain from a hip fracture 6 months ago. She states that she takes a shot every 3 weeks for her anemia, but cannot recall the name (I mentioned procrit and epo ) of the medication. Patient also has a remote history of a colectomy 15-16 years ago, but cannot recall the reason. She states "I was having to go all the time and I had a block in my colon so they had to take out 11 inches. Then it wouldn't heal, but everything is ok now."    Past Medical History:   Diagnosis Date    Anemia     Aneurysm of aorta     Back pain     Cataract     Diabetes mellitus     sees Endocrine yearly ??   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    Melanoma 2011    melanoma in situ of R nasal tip, excised with FTSG and treated with Aldara    MGUS (monoclonal gammopathy of unknown significance)     Dr Hodges    Osteopenia     Senile osteoporosis     Skin cancer     Skin neoplasm     Stroke     left weak -     Tongue cancer 2010    [id number]    Trouble in sleeping     Urinary incontinence     Vitamin B 12 deficiency     Vitamin B12 deficiency     Vitamin K deficiency        Past Surgical History:   Procedure Laterality Date    APPENDECTOMY      CATARACT EXTRACTION W/  INTRAOCULAR LENS IMPLANT Left 10/16/14    OS ()    CHOLECYSTECTOMY      COLECTOMY      eyelid surgery      HYSTERECTOMY      PARTIAL GLOSSECTOMY  2010    SELECTIVE NECK DISSECTION  2010    tonsilectomy      TONSILLECTOMY         Review of patient's allergies indicates:   Allergen Reactions    Baclofen Nausea And Vomiting and Other (See Comments)     Dizziness    Benazepril      Other reaction(s): Rash,Swelling    Carisoprodol      Other reaction(s): Dysphoria    Desloratadine      Other reaction(s): Nausea  Other reaction(s): Nausea    Hydrocodone      Other reaction(s): disorientation    Methocarbamol      Other reaction(s): Nausea    Omeprazole Nausea And Vomiting    Oxybutynin      Other reaction(s): Hallucinations    Oxycodone Nausea And Vomiting    Tramadol      Other reaction(s): dizziness    Benazepril-hydrochlorothiazide Rash     Other reaction(s): Swelling    Metformin Diarrhea    Prednisone Rash     Family History     Problem Relation (Age of Onset)    COPD     Coronary artery disease         Social History Main Topics    Smoking status: Never Smoker    Smokeless tobacco: Never Used    Alcohol use No    Drug  use: No    Sexual activity: No     Review of Systems   Constitutional: Negative for activity change and appetite change.   HENT: Negative for congestion and facial swelling.    Eyes: Negative for discharge and itching.   Respiratory: Negative for apnea and shortness of breath.    Cardiovascular: Negative for chest pain and palpitations.   Gastrointestinal: Positive for vomiting. Negative for abdominal distention, abdominal pain, anal bleeding and blood in stool.   Endocrine: Positive for polyuria. Negative for cold intolerance and heat intolerance.   Genitourinary: Positive for enuresis. Negative for difficulty urinating and dysuria.   Skin: Negative for color change and pallor.   Neurological: Negative for dizziness and headaches.     Objective:     Vital Signs (Most Recent):  Temp: 97.4 °F (36.3 °C) (07/10/17 0350)  Pulse: 74 (07/10/17 0700)  Resp: 16 (07/10/17 0350)  BP: 138/62 (07/10/17 0350)  SpO2: (!) 92 % (07/10/17 0350) Vital Signs (24h Range):  Temp:  [97.4 °F (36.3 °C)-98.3 °F (36.8 °C)] 97.4 °F (36.3 °C)  Pulse:  [72-95] 74  Resp:  [16-27] 16  SpO2:  [92 %-98 %] 92 %  BP: (137-172)/(60-95) 138/62     Weight: 61.7 kg (136 lb) (07/09/17 1023)  Body mass index is 27.47 kg/m².      Intake/Output Summary (Last 24 hours) at 07/10/17 0843  Last data filed at 07/09/17 2300   Gross per 24 hour   Intake              480 ml   Output                0 ml   Net              480 ml       Lines/Drains/Airways     Peripheral Intravenous Line                 Peripheral IV - Single Lumen 07/09/17 1055 Right Antecubital less than 1 day                Physical Exam   Constitutional: She is oriented to person, place, and time. She appears well-developed and well-nourished.   HENT:   Head: Normocephalic and atraumatic.   Mouth/Throat: Oropharynx is clear and moist and mucous membranes are normal.       Eyes: Conjunctivae, EOM and lids are normal.   Neck: No neck rigidity. No edema and normal range of motion present.  "  Cardiovascular: Normal rate, regular rhythm and normal heart sounds.    Pulmonary/Chest: Effort normal and breath sounds normal.   Abdominal: Soft. Normal appearance and bowel sounds are normal. There is no hepatosplenomegaly. There is tenderness in the epigastric area. There is no guarding and negative Foley's sign.   Neurological: She is alert and oriented to person, place, and time.   Skin: Skin is warm, dry and intact.   Psychiatric: She has a normal mood and affect. Her speech is normal and behavior is normal.       Significant Labs:  CBC:   Recent Labs  Lab 07/09/17  1111 07/10/17  0439   WBC 11.00 6.64   HGB 10.2* 8.7*   HCT 32.9* 28.3*    287     CMP:   Recent Labs  Lab 07/10/17  0439      CALCIUM 9.2   ALBUMIN 3.1*   PROT 6.1      K 3.4*   CO2 28      BUN 13   CREATININE 0.7   ALKPHOS 53*   ALT 9*   AST 18   BILITOT 0.4     Coagulation:   Recent Labs  Lab 07/09/17  1111   INR 1.0   APTT <21.0       Significant Imaging:  CXR AP portable 07/09/2017  No detrimental change since 12/25/16.    Colonoscopy 02/07/2013  Impression:             - Non-thrombosed external hemorrhoids found on perianal exam.  - Patent end-to-side ileo-colonic anastomosis.  - Internal hemorrhoids.  - Diverticulosis in the ascending colon.            Assessment/Plan:     * Gastrointestinal hemorrhage    Marian Trimble is a 81 y.o. F who presented to the ED yesterday after 3 epiosdes of black emesis a/w SOB and worsenign substernal CP described as normal reflux related pain, but more severe, since the prior evening. Patient has baseline anemia since treatment for melanoma and lymphoma in 2012. Takes "shots for her blood count" every 3 weeks. (unable to determine the med via chart review.) Has not had additional episodes of emesis since admission    -GARETH negative for melena or deion blood in the rectal vault  -Recommend serial H/H to monitor for additional bleeds  -Plavix has been d/c'd  -Continue Protonix " 40mg BID  -Recommend liquid diet until midnight, NPO thereafter for EGD tomorrow             Thank you for your consult. I will follow-up with patient. Please contact us if you have any additional questions.    Tez Doyle MD  Internal Medicine PGY-1  [facility]   Deteriorating patient status - Patient was clinically upgraded due to deteriorating patient status.

## 2017-07-11 NOTE — ASSESSMENT & PLAN NOTE
- BP elevated on admit  - Continue home norvasc and losartan  7/10-7/11: BP controlled. Will continue to monitor

## 2017-07-11 NOTE — PLAN OF CARE
Problem: Patient Care Overview  Goal: Plan of Care Review  Outcome: Ongoing (interventions implemented as appropriate)  Pt free of fall-high fall risk, bed alarm set, bed low position, wheels locked, call light within reach, instructed to call for help when want to get out of bed. Pt verbalized understandings. No skin breakdown noted. Cbg monitored and insulin administered per MAR ordered. AAOx4. Afebrile. No acute distress noted. Vss. Will monitor pt.

## 2017-07-11 NOTE — DISCHARGE INSTRUCTIONS

## 2017-07-11 NOTE — ASSESSMENT & PLAN NOTE
- 3 episodes of black emesis  - Hgb 10.2 on admit; at baseline. Guaiac positive in ED  - GI consulted in the ED; awaiting recs  - Received IV protonix in the ED  - NPO at [facility]  7/10: Hgb 10.2->8.7. H/H q6 hours; blood consent signed and placed in chart. Per GI, CLD and NPO at [facility] for EGD tomorrow. Will continue Protonix 40 mg BID per recs.   7/11: H/H stable. EGD shows mucosal changes in the duodenum with stenosis, suspicious for malignancy; biopsied; may be causing slight gastric outlet obstruction; no sources for bleeding seen. GI rec CT abd/pelvis with pancreas protocol with contrast.

## 2017-07-11 NOTE — ASSESSMENT & PLAN NOTE
- No deficits on admit  - ASA and plavix held on admit  7/11: Resumed ASA today per recs. Plan to resume Plavix tomorrow.

## 2017-07-12 VITALS
BODY MASS INDEX: 27.42 KG/M2 | SYSTOLIC BLOOD PRESSURE: 122 MMHG | HEART RATE: 99 BPM | HEIGHT: 59 IN | OXYGEN SATURATION: 94 % | DIASTOLIC BLOOD PRESSURE: 58 MMHG | RESPIRATION RATE: 16 BRPM | TEMPERATURE: 98 F | WEIGHT: 136 LBS

## 2017-07-12 LAB
ALBUMIN SERPL BCP-MCNC: 3.3 G/DL
ALP SERPL-CCNC: 59 U/L
ALT SERPL W/O P-5'-P-CCNC: 9 U/L
ANION GAP SERPL CALC-SCNC: 11 MMOL/L
AST SERPL-CCNC: 23 U/L
BASOPHILS # BLD AUTO: 0.02 K/UL
BASOPHILS NFR BLD: 0.2 %
BILIRUB SERPL-MCNC: 0.8 MG/DL
BUN SERPL-MCNC: 13 MG/DL
CALCIUM SERPL-MCNC: 9 MG/DL
CHLORIDE SERPL-SCNC: 102 MMOL/L
CO2 SERPL-SCNC: 26 MMOL/L
CREAT SERPL-MCNC: 0.7 MG/DL
DIFFERENTIAL METHOD: ABNORMAL
EOSINOPHIL # BLD AUTO: 0.2 K/UL
EOSINOPHIL NFR BLD: 1.4 %
ERYTHROCYTE [DISTWIDTH] IN BLOOD BY AUTOMATED COUNT: 14.8 %
EST. GFR  (AFRICAN AMERICAN): >60 ML/MIN/1.73 M^2
EST. GFR  (NON AFRICAN AMERICAN): >60 ML/MIN/1.73 M^2
GLUCOSE SERPL-MCNC: 94 MG/DL
HCT VFR BLD AUTO: 31.2 %
HCT VFR BLD AUTO: 32.2 %
HGB BLD-MCNC: 10.1 G/DL
HGB BLD-MCNC: 9.5 G/DL
LYMPHOCYTES # BLD AUTO: 1.9 K/UL
LYMPHOCYTES NFR BLD: 16.8 %
MAGNESIUM SERPL-MCNC: 1.4 MG/DL
MCH RBC QN AUTO: 25.1 PG
MCHC RBC AUTO-ENTMCNC: 30.4 %
MCV RBC AUTO: 82 FL
MONOCYTES # BLD AUTO: 0.7 K/UL
MONOCYTES NFR BLD: 5.9 %
NEUTROPHILS # BLD AUTO: 8.3 K/UL
NEUTROPHILS NFR BLD: 75.3 %
PHOSPHATE SERPL-MCNC: 3.4 MG/DL
PLATELET # BLD AUTO: 315 K/UL
PMV BLD AUTO: 10.3 FL
POCT GLUCOSE: 108 MG/DL (ref 70–110)
POCT GLUCOSE: 90 MG/DL (ref 70–110)
POTASSIUM SERPL-SCNC: 3.7 MMOL/L
PROT SERPL-MCNC: 6.7 G/DL
RBC # BLD AUTO: 3.79 M/UL
SODIUM SERPL-SCNC: 139 MMOL/L
WBC # BLD AUTO: 11.05 K/UL

## 2017-07-12 PROCEDURE — 85025 COMPLETE CBC W/AUTO DIFF WBC: CPT

## 2017-07-12 PROCEDURE — 84100 ASSAY OF PHOSPHORUS: CPT

## 2017-07-12 PROCEDURE — 99217 PR OBSERVATION CARE DISCHARGE: CPT | Mod: ,,, | Performed by: PHYSICIAN ASSISTANT

## 2017-07-12 PROCEDURE — 25000003 PHARM REV CODE 250: Performed by: PHYSICIAN ASSISTANT

## 2017-07-12 PROCEDURE — 83735 ASSAY OF MAGNESIUM: CPT

## 2017-07-12 PROCEDURE — 80053 COMPREHEN METABOLIC PANEL: CPT

## 2017-07-12 PROCEDURE — G0378 HOSPITAL OBSERVATION PER HR: HCPCS

## 2017-07-12 PROCEDURE — 25500020 PHARM REV CODE 255: Performed by: HOSPITALIST

## 2017-07-12 PROCEDURE — 85014 HEMATOCRIT: CPT

## 2017-07-12 PROCEDURE — 36415 COLL VENOUS BLD VENIPUNCTURE: CPT

## 2017-07-12 PROCEDURE — 85018 HEMOGLOBIN: CPT

## 2017-07-12 RX ORDER — LANOLIN ALCOHOL/MO/W.PET/CERES
400 CREAM (GRAM) TOPICAL EVERY 4 HOURS
Status: DISCONTINUED | OUTPATIENT
Start: 2017-07-12 | End: 2017-07-12 | Stop reason: HOSPADM

## 2017-07-12 RX ORDER — CLOPIDOGREL BISULFATE 75 MG/1
75 TABLET ORAL ONCE
Status: COMPLETED | OUTPATIENT
Start: 2017-07-12 | End: 2017-07-12

## 2017-07-12 RX ADMIN — AMLODIPINE BESYLATE 5 MG: 5 TABLET ORAL at 09:07

## 2017-07-12 RX ADMIN — LOSARTAN POTASSIUM 50 MG: 50 TABLET, FILM COATED ORAL at 09:07

## 2017-07-12 RX ADMIN — CLOPIDOGREL 75 MG: 75 TABLET, FILM COATED ORAL at 02:07

## 2017-07-12 RX ADMIN — VITAMIN D, TAB 1000IU (100/BT) 1000 UNITS: 25 TAB at 09:07

## 2017-07-12 RX ADMIN — MAGNESIUM OXIDE TAB 400 MG (241.3 MG ELEMENTAL MG) 400 MG: 400 (241.3 MG) TAB at 10:07

## 2017-07-12 RX ADMIN — IOHEXOL 75 ML: 350 INJECTION, SOLUTION INTRAVENOUS at 02:07

## 2017-07-12 RX ADMIN — MAGNESIUM OXIDE TAB 400 MG (241.3 MG ELEMENTAL MG) 400 MG: 400 (241.3 MG) TAB at 02:07

## 2017-07-12 RX ADMIN — ASPIRIN 81 MG CHEWABLE TABLET 81 MG: 81 TABLET CHEWABLE at 09:07

## 2017-07-12 RX ADMIN — Medication 3 ML: at 02:07

## 2017-07-12 RX ADMIN — Medication 3 ML: at 06:07

## 2017-07-12 RX ADMIN — ATORVASTATIN CALCIUM 40 MG: 20 TABLET, FILM COATED ORAL at 09:07

## 2017-07-12 RX ADMIN — MAGNESIUM OXIDE TAB 400 MG (241.3 MG ELEMENTAL MG) 400 MG: 400 (241.3 MG) TAB at 08:07

## 2017-07-12 RX ADMIN — PANTOPRAZOLE SODIUM 40 MG: 40 TABLET, DELAYED RELEASE ORAL at 09:07

## 2017-07-12 NOTE — ASSESSMENT & PLAN NOTE
- 3 episodes of black emesis  - Hgb 10.2 on admit; at baseline. Guaiac positive in ED  - GI consulted in the ED; awaiting recs  - Received IV protonix in the ED  - [facility] at [facility]  7/10: Hgb 10.2->8.7. H/H q6 hours; blood consent signed and placed in chart. Per GI, CLD and NPO at [facility] for EGD tomorrow. Will continue Protonix 40 mg BID per recs.   7/11: H/H stable. EGD shows mucosal changes in the duodenum with stenosis, suspicious for malignancy; biopsied; may be causing slight gastric outlet obstruction; no sources for bleeding seen. GI rec CT abd/pelvis with pancreas protocol with contrast.   7/12: CT abd/pelvis shows circumferential thickening of the distal thoracic esophagus extending to the gastroesophageal junction; malignancy is not excluded. Per GI, pt to follow up with clinic for biopsy results. Pt discharged home with resumed ASA and plavix. Placed amb ref to GI. Pt to follow up with PCP as an outpatient.

## 2017-07-12 NOTE — ASSESSMENT & PLAN NOTE
- No deficits on admit  - ASA and plavix held on admit  7/11: Resumed ASA today per recs. Plan to resume Plavix tomorrow.  7/12: Resumed ASA and plavix

## 2017-07-12 NOTE — ASSESSMENT & PLAN NOTE
- BP elevated on admit  - Continue home norvasc and losartan  7/10-7/12: BP controlled. Will continue to monitor

## 2017-07-12 NOTE — DISCHARGE SUMMARY
"[facility] Hospital Medicine  Discharge Summary      Patient Name: Marina Trimble  MRN: [id number]  Admission Date: 7/9/2017  Hospital Length of Stay: 0 days  Discharge Date and Time:  07/12/2017 3:22 PM  Attending Physician: Jennifer Hernandez MD   Discharging Provider: Claribel Zarco PA-C  Primary Care Provider: Jose Jimenez MD  [facility] Medicine Team: [facility] F Claribel Zarco PA-C    HPI:   81 year old female with a PMHx of HTN, HLD, DM2, CVA with residual L sided weakness (on Plavix), GERD, and tongue cancer s/p partial glossectomy presenting to the ED c/o dark emesis. Pt states she was out of town in [place] with family and started feeling poor yesterday evening from "indigestion." Pt states she had chest pain this morning "in the center of my chest" described as stabbing and "worse than my usual indigestion." Pt reports three episodes of "black" emesis "so my son brought me to the ER." At the time of my exam, pt denies chest pain, SOB, abdominal pain, nausea, diarrhea, fever, chills.       In the ED, pt is hypertensive. Pt is afebrile without leukocytosis. Hgb 10.2 (at baseline). Guaiac positive. Troponin WNL. CXR shows no acute process. EKG without changes. GI consulted in the ED.    Procedure(s) (LRB):  ESOPHAGOGASTRODUODENOSCOPY (EGD) (N/A)      Indwelling Lines/Drains at time of discharge:   Lines/Drains/Airways          No matching active lines, drains, or airways        Hospital Course:   Pt admitted to observation for GI bleed. GI consulted in the ED; awaiting recs. ASA and Plavix held on admit. NPO after midnight.  7/10: Hgb 10.2->8.7. H/H q6 hours; blood consent signed and placed in chart. Per GI, CLD and [facility]PO at [facility] for EGD tomorrow. Will continue Protonix 40 mg BID per recs.   7/11: H/H stable. EGD shows mucosal changes in the duodenum with stenosis, suspicious for malignancy; biopsied; may be causing slight gastric outlet obstruction; no sources for bleeding seen. GI " rec CT abd/pelvis with pancreas protocol with contrast. Resumed ASA today per recs. Plan to resume Plavix tomorrow.  7/12: CT abd/pelvis shows circumferential thickening of the distal thoracic esophagus extending to the gastroesophageal junction; malignancy is not excluded. Per GI, pt to follow up with clinic for biopsy results. Pt discharged home with resumed ASA and plavix. Placed amb ref to GI. Pt to follow up with PCP as an outpatient.     Consults:   Consults         Status Ordering Provider     Inpatient consult to Gastroenterology  Once     Provider:  (Not yet assigned)    Completed MK LEONARDO          Significant Diagnostic Studies: Radiology: X-Ray: CXR  CT scan: CT ABDOMEN PELVIS WITH CONTRAST  Endoscopy: EGD with biopsy    Pending Diagnostic Studies:     Procedure Component Value Units Date/Time    Hematocrit [[id number]] Collected:  07/12/17 0345    Order Status:  Sent Lab Status:  In process Updated:  07/12/17 0346    Specimen:  Blood from Blood     Hemoglobin [[id number]] Collected:  07/12/17 0345    Order Status:  Sent Lab Status:  In process Updated:  07/12/17 0346    Specimen:  Blood from Blood         Final Active Diagnoses:    Diagnosis Date Noted POA    PRINCIPAL PROBLEM:  Gastrointestinal hemorrhage [K92.2] 07/09/2017 Yes    Diabetes mellitus type 2, uncontrolled [E11.65] 08/05/2013 Yes    Essential hypertension [I10]  Yes    Hyperlipidemia [E78.5]  Yes    Back pain [M54.9] 06/08/2015 Yes    History of CVA (cerebrovascular accident) [Z86.73] 06/16/2014 Not Applicable    History of tongue cancer [Z85.810] 07/12/2012 Yes      Problems Resolved During this Admission:    Diagnosis Date Noted Date Resolved POA    Chest pain [R07.9] 07/09/2017 07/11/2017 Yes      * Gastrointestinal hemorrhage    - 3 episodes of black emesis  - Hgb 10.2 on admit; at baseline. Guaiac positive in ED  - GI consulted in the ED; awaiting recs  - Received IV protonix in the ED  - [facility]PO at [facility]  7/10: Hgb 10.2->8.7.  H/H q6 hours; blood consent signed and placed in chart. Per GI, CLD and NPO at [facility] for EGD tomorrow. Will continue Protonix 40 mg BID per recs.   7/11: H/H stable. EGD shows mucosal changes in the duodenum with stenosis, suspicious for malignancy; biopsied; may be causing slight gastric outlet obstruction; no sources for bleeding seen. GI rec CT abd/pelvis with pancreas protocol with contrast.   7/12: CT abd/pelvis shows circumferential thickening of the distal thoracic esophagus extending to the gastroesophageal junction; malignancy is not excluded. Per GI, pt to follow up with clinic for biopsy results. Pt discharged home with resumed ASA and plavix. Placed amb ref to GI. Pt to follow up with PCP as an outpatient.        Diabetes mellitus type 2, uncontrolled    - A1c 8.1  - Cardiac/DM diet  - Low dose SSI. Adjusted insulin regimen; will titrate PRN          Hyperlipidemia    - Continue ASA, lipitor          Essential hypertension    - BP elevated on admit  - Continue home norvasc and losartan  7/10-7/12: BP controlled. Will continue to monitor        Back pain    S/p L pubic fracture; follows with ortho as outpatient  - Continue acetaminophen-codeine PRN  - Pt reports poor tolerance to opioids  7/10: Naproxen held pending EGD results        History of CVA (cerebrovascular accident)    - No deficits on admit  - ASA and plavix held on admit  7/11: Resumed ASA today per recs. Plan to resume Plavix tomorrow.  7/12: Resumed ASA and plavix        History of tongue cancer    S/p partial L glossectomy  - follows with ENT as outpatient  - Dysarthria at baseline  - No changes per patient              Discharged Condition: stable    Disposition: Home or Self Care    Follow Up:  Follow-up Information     Jose Jimenez MD On 8/3/2017.    Specialty:  Internal Medicine  Why:  hospital follow up 09:20  Contact information:  [address]  [place] [address] [phone]                 Patient Instructions:     Ambulatory  "referral to Gastroenterology   Referral Priority: Routine Referral Type: Consultation   Referral Reason: Specialty Services Required    Requested Specialty: Gastroenterology    Number of Visits Requested: 1      Diet Cardiac     Diet Diabetic 1800 Calories     Activity as tolerated     Call MD for:  temperature >100.4     Call MD for:  persistent nausea and vomiting or diarrhea     Call MD for:  severe uncontrolled pain     Call MD for:  redness, tenderness, or signs of infection (pain, swelling, redness, odor or green/yellow discharge around incision site)       Medications:  Reconciled Home Medications:   Current Discharge Medication List      CONTINUE these medications which have NOT CHANGED    Details   !! ACCU-CHEK FASTCLIX Misc USE TO CHECK BLOOD GLUCOSE THREE TIMES DAILY  Qty: 1000 each, Refills: 11      acetaminophen-codeine 300-30mg (TYLENOL #3) 300-30 mg Tab Take 1 tablet by mouth every 6 (six) hours as needed.  Qty: 30 tablet, Refills: 0      amlodipine (NORVASC) 5 MG tablet Take 1 tablet (5 mg total) by mouth once daily.  Qty: 90 tablet, Refills: 4    Comments: 90 day supply  Associated Diagnoses: Hypertension associated with diabetes      aspirin (ECOTRIN) 81 MG EC tablet Take 81 mg by mouth once daily.      atorvastatin (LIPITOR) 40 MG tablet TAKE 1 TABLET ONE TIME DAILY  Qty: 90 tablet, Refills: 3    Associated Diagnoses: Combined hyperlipidemia associated with type 2 diabetes mellitus      BD ULTRA-FINE BEVERLY PEN NEEDLES 32 gauge x 5/32" Ndle USE TWICE DAILY  Qty: 500 each, Refills: 12    Associated Diagnoses: Type II or unspecified type diabetes mellitus without mention of complication, uncontrolled      blood sugar diagnostic (BLOOD GLUCOSE TEST) Strp Use to check blood glucose 3 times daily. Device is Accucheck.  Qty: 100 each, Refills: 11      blood-glucose meter Misc Use ad directed (Accu Check Beverly smartview meter)  Qty: 1 each, Refills: 4      clopidogrel (PLAVIX) 75 mg tablet TAKE 1 TABLET ONE " "TIME DAILY  Qty: 90 tablet, Refills: 3      cycloSPORINE (RESTASIS) 0.05 % ophthalmic emulsion Place 0.4 mLs (1 drop total) into both eyes 2 (two) times daily.  Qty: 180 vial, Refills: 12    Associated Diagnoses: Bilateral dry eyes      insulin aspart (NOVOLOG FLEXPEN) 100 unit/mL InPn pen Inject 4/6/6 units into skin with correction scale 150-200 +1, 201-250 +2, 251-300 +3, 301-350 +4, >350 +5.  Qty: 6 mL, Refills: 4      LEVEMIR FLEXTOUCH 100 unit/mL (3 mL) InPn pen INJECT 35 UNITS INTO THE SKIN EVERY EVENING.  Qty: 30 mL, Refills: 4      losartan (COZAAR) 50 MG tablet TAKE 1 TABLET ONE TIME DAILY  Qty: 90 tablet, Refills: 12    Associated Diagnoses: Hypertension associated with diabetes      multivitamin (CHEWABLE MULTI VITAMIN) Chew Take 2 tablets by mouth once daily.       naproxen (NAPROSYN) 500 MG tablet Take 1 tablet (500 mg total) by mouth 2 (two) times daily with meals.  Qty: 11 tablet, Refills: 0      NOVOFINE 32 32 gauge x 1/4" Ndle USE WITH LEVEMIR PEN EVERY EVENING  Qty: 100 each, Refills: 3      omeprazole (PRILOSEC) 40 MG capsule Take 40 mg by mouth once daily at 6am.      pantoprazole (PROTONIX) 40 MG tablet Take 1 tablet (40 mg total) by mouth once daily.  Qty: 30 tablet, Refills: 3      PROCTOZONE-HC 2.5 % rectal cream Place rectally 2 (two) times daily.  Qty: 90 g, Refills: 12      !! TRUEPLUS LANCETS 28 gauge Misc USE  TO CHECK BLOOD SUGAR THREE TIMES DAILY  Qty: 300 each, Refills: 0      vitamin D 1000 units Tab Take 1 tablet (1,000 Units total) by mouth once daily.      acetaminophen (TYLENOL) 500 MG tablet Take 1 tablet (500 mg total) by mouth every 4 (four) hours as needed for Pain.  Refills: 0      amitriptyline (ELAVIL) 10 MG tablet Take 10 mg by mouth once daily.       cyanocobalamin 1,000 mcg/mL injection INJECT 1 ML UNDER THE SKIN EVERY 2 WEEKS  Qty: 6 mL, Refills: 10    Associated Diagnoses: Vitamin B12 deficiency      triamcinolone acetonide 0.1% (KENALOG) 0.1 % cream Apply topically " 2 (two) times daily.  Qty: 45 g, Refills: 3      UNABLE TO FIND Places uses Wellpatch daily      VOLTAREN 1 % Gel        !! - Potential duplicate medications found. Please discuss with provider.        Time spent on the discharge of patient: 30 minutes    Claribel Zarco PA-C  Department of Hospital Medicine  [facility]

## 2017-07-12 NOTE — NURSING
Discharge instructions given to patient and spouse on medications, diet, activity restrictions, follow-up visits with understanding. IV discontinued. Patient without complaints. Patient discharged via wheelchair with spouse at side and transported by escort.

## 2017-07-18 ENCOUNTER — OFFICE VISIT (OUTPATIENT)
Dept: FAMILY MEDICINE | Facility: CLINIC | Age: 82
End: 2017-07-18
Payer: MEDICARE

## 2017-07-18 VITALS
DIASTOLIC BLOOD PRESSURE: 68 MMHG | TEMPERATURE: 98 F | HEART RATE: 94 BPM | SYSTOLIC BLOOD PRESSURE: 120 MMHG | HEIGHT: 58 IN | WEIGHT: 132.5 LBS | BODY MASS INDEX: 27.81 KG/M2 | OXYGEN SATURATION: 97 %

## 2017-07-18 DIAGNOSIS — M54.6 CHRONIC THORACIC BACK PAIN, UNSPECIFIED BACK PAIN LATERALITY: ICD-10-CM

## 2017-07-18 DIAGNOSIS — Z87.81 HISTORY OF COMPRESSION FRACTURE OF VERTEBRAL COLUMN: ICD-10-CM

## 2017-07-18 DIAGNOSIS — G89.29 CHRONIC THORACIC BACK PAIN, UNSPECIFIED BACK PAIN LATERALITY: ICD-10-CM

## 2017-07-18 DIAGNOSIS — M81.0 SENILE OSTEOPOROSIS: Primary | ICD-10-CM

## 2017-07-18 PROCEDURE — 99999 PR PBB SHADOW E&M-EST. PATIENT-LVL V: CPT | Mod: PBBFAC,,, | Performed by: NURSE PRACTITIONER

## 2017-07-18 PROCEDURE — 1159F MED LIST DOCD IN RCRD: CPT | Mod: S$GLB,,, | Performed by: NURSE PRACTITIONER

## 2017-07-18 PROCEDURE — 99499 UNLISTED E&M SERVICE: CPT | Mod: S$GLB,,, | Performed by: NURSE PRACTITIONER

## 2017-07-18 PROCEDURE — 99214 OFFICE O/P EST MOD 30 MIN: CPT | Mod: S$GLB,,, | Performed by: NURSE PRACTITIONER

## 2017-07-18 RX ORDER — ACETAMINOPHEN AND CODEINE PHOSPHATE 300; 60 MG/1; MG/1
1 TABLET ORAL EVERY 6 HOURS PRN
Qty: 60 TABLET | Refills: 0 | Status: SHIPPED | OUTPATIENT
Start: 2017-07-18 | End: 2017-09-06 | Stop reason: SDUPTHER

## 2017-07-19 NOTE — PROGRESS NOTES
This dictation has been generated using Dragon Dictation some phonetic errors may occur.     Marina was seen today for hospital follow up and back pain.    Diagnoses and all orders for this visit:    Senile osteoporosis    History of compression fracture of vertebral column    Chronic thoracic back pain, unspecified back pain laterality    Diabetes mellitus with neurological manifestations, uncontrolled    Other orders  -     acetaminophen-codeine 300-60mg (TYLENOL #4) 300-60 mg Tab; Take 1 tablet by mouth every 6 (six) hours as needed (back pain).      Senile osteoporosis continue vitamin D  History of compression fracture with chronic thoracic and lower back pain.  Tylenol 3 provided brief relief.  Increase to Tylenol for.  NSAID contraindicated  Diabetes with neurologic manifestations uncontrolled.  Pain med as above.     Return for keep follow up with pcp.      ________________________________________________________________  ________________________________________________________________        Chief Complaint   Patient presents with    Hospital Follow Up    Back Pain     History of present illness  This 81 y.o. presents today for complaint of back pain and results.  Patient has a history of back pain.  She has a history of kyphosis.  She has a history of a compression fracture.  Currently with back pain.  She notes some control of symptoms with Tylenol 3 but brief relief.  Patient denies any radiculopathy.  Discuss EGD result.  Biopsy is pending  Review of systems  Complete review of systems otherwise unremarkable  Past medical and social history reviewed      Past Medical History:   Diagnosis Date    Anemia     Aneurysm of aorta     Back pain     Cataract     Diabetes mellitus     sees Endocrine yearly ??   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    Melanoma 2011    melanoma in situ of R nasal tip, excised with FTSG and treated with Aldara    MGUS (monoclonal gammopathy of unknown significance)     Dr Hodges    Osteopenia     Senile osteoporosis     Skin cancer     Skin neoplasm     Stroke     left weak -     Tongue cancer 2010    [id number]    Trouble in sleeping     Urinary incontinence     Vitamin B 12 deficiency     Vitamin B12 deficiency     Vitamin K deficiency        Past Surgical History:   Procedure Laterality Date    APPENDECTOMY      CATARACT EXTRACTION W/  INTRAOCULAR LENS IMPLANT Left 10/16/14    OS ()    CHOLECYSTECTOMY      COLECTOMY      COLONOSCOPY      EYE SURGERY Bilateral     cataracts    eyelid surgery      HYSTERECTOMY      PARTIAL GLOSSECTOMY  2010    SELECTIVE NECK DISSECTION  2010    tonsilectomy      TONSILLECTOMY         Family History   Problem Relation Age of Onset    Coronary artery disease      COPD      Skin cancer Neg Hx     Melanoma Neg Hx        Social History     Social History    Marital status:      Spouse name: N/A    Number of children: N/A    Years of education: N/A     Social History Main Topics    Smoking status: Never Smoker    Smokeless tobacco: Never Used    Alcohol use No    Drug use: No    Sexual activity: No     Other Topics Concern    Are You Pregnant Or Think You May Be? No    Breast-Feeding No     Social History Narrative    She is . She [place] on the [facility].       Current Outpatient Prescriptions   Medication Sig Dispense Refill    ACCU-CHEK FASTCLIX Misc USE TO CHECK BLOOD GLUCOSE THREE TIMES DAILY 1000 each 11    acetaminophen  "(TYLENOL) 500 MG tablet Take 1 tablet (500 mg total) by mouth every 4 (four) hours as needed for Pain.  0    amitriptyline (ELAVIL) 10 MG tablet Take 10 mg by mouth once daily.       amlodipine (NORVASC) 5 MG tablet Take 1 tablet (5 mg total) by mouth once daily. 90 tablet 4    aspirin (ECOTRIN) 81 MG EC tablet Take 81 mg by mouth once daily.      atorvastatin (LIPITOR) 40 MG tablet TAKE 1 TABLET ONE TIME DAILY 90 tablet 3    BD ULTRA-FINE BEVERLY PEN NEEDLES 32 gauge x 5/32" Ndle USE TWICE DAILY 500 each 12    blood sugar diagnostic (BLOOD GLUCOSE TEST) Strp Use to check blood glucose 3 times daily. Device is Accucheck. 100 each 11    blood-glucose meter Misc Use ad directed (Accu Check Beverly smartview meter) 1 each 4    clopidogrel (PLAVIX) 75 mg tablet TAKE 1 TABLET ONE TIME DAILY 90 tablet 3    cyanocobalamin 1,000 mcg/mL injection INJECT 1 ML UNDER THE SKIN EVERY 2 WEEKS (Patient taking differently: INJECT 1 ML UNDER THE SKIN EVERY 3 WEEKS) 6 mL 10    cycloSPORINE (RESTASIS) 0.05 % ophthalmic emulsion Place 0.4 mLs (1 drop total) into both eyes 2 (two) times daily. 180 vial 12    insulin aspart (NOVOLOG FLEXPEN) 100 unit/mL InPn pen Inject 4/6/6 units into skin with correction scale 150-200 +1, 201-250 +2, 251-300 +3, 301-350 +4, >350 +5. 6 mL 4    LEVEMIR FLEXTOUCH 100 unit/mL (3 mL) InPn pen INJECT 35 UNITS INTO THE SKIN EVERY EVENING. 30 mL 4    losartan (COZAAR) 50 MG tablet TAKE 1 TABLET ONE TIME DAILY 90 tablet 12    multivitamin (CHEWABLE MULTI VITAMIN) Chew Take 2 tablets by mouth once daily.       naproxen (NAPROSYN) 500 MG tablet Take 1 tablet (500 mg total) by mouth 2 (two) times daily with meals. 11 tablet 0    NOVOFINE 32 32 gauge x 1/4" Ndle USE WITH LEVEMIR PEN EVERY EVENING 100 each 3    omeprazole (PRILOSEC) 40 MG capsule Take 40 mg by mouth once daily at 6am.      pantoprazole (PROTONIX) 40 MG tablet Take 1 tablet (40 mg total) by mouth once daily. 30 tablet 3    PROCTOZONE-HC " 2.5 % rectal cream Place rectally 2 (two) times daily. 90 g 12    TRUEPLUS LANCETS 28 gauge Misc USE  TO CHECK BLOOD SUGAR THREE TIMES DAILY 300 each 0    UNABLE TO FIND Places uses Wellpatch daily      vitamin D 1000 units Tab Take 1 tablet (1,000 Units total) by mouth once daily.      VOLTAREN 1 % Gel       acetaminophen-codeine 300-60mg (TYLENOL #4) 300-60 mg Tab Take 1 tablet by mouth every 6 (six) hours as needed (back pain). 60 tablet 0    triamcinolone acetonide 0.1% (KENALOG) 0.1 % cream Apply topically 2 (two) times daily. 45 g 3     No current facility-administered medications for this visit.        Review of patient's allergies indicates:   Allergen Reactions    Baclofen Nausea And Vomiting and Other (See Comments)     Dizziness    Benazepril      Other reaction(s): Rash,Swelling    Carisoprodol      Other reaction(s): Dysphoria    Desloratadine      Other reaction(s): Nausea  Other reaction(s): Nausea    Hydrocodone      Other reaction(s): disorientation    Methocarbamol      Other reaction(s): Nausea    Omeprazole Nausea And Vomiting    Oxybutynin      Other reaction(s): Hallucinations    Oxycodone Nausea And Vomiting    Tramadol      Other reaction(s): dizziness    Benazepril-hydrochlorothiazide Rash     Other reaction(s): Swelling    Metformin Diarrhea    Prednisone Rash       Physical examination  Vitals Reviewed  Gen. Well-dressed well-nourished no apparent distress  Skin warm dry and intact.  No rashes noted.  Chest.  Respirations are even unlabored.    Neuro. Awake alert oriented x4.  Normal judgment and cognition noted.  Extremities no clubbing cyanosis or edema noted.     Call or return to clinic prn if these symptoms worsen or fail to improve as anticipated.

## 2017-08-03 ENCOUNTER — TELEPHONE (OUTPATIENT)
Dept: FAMILY MEDICINE | Facility: CLINIC | Age: 82
End: 2017-08-03

## 2017-08-03 ENCOUNTER — OFFICE VISIT (OUTPATIENT)
Dept: FAMILY MEDICINE | Facility: CLINIC | Age: 82
End: 2017-08-03
Payer: MEDICARE

## 2017-08-03 VITALS
HEIGHT: 58 IN | BODY MASS INDEX: 28.73 KG/M2 | SYSTOLIC BLOOD PRESSURE: 120 MMHG | DIASTOLIC BLOOD PRESSURE: 70 MMHG | OXYGEN SATURATION: 94 % | TEMPERATURE: 99 F | HEART RATE: 99 BPM | WEIGHT: 136.88 LBS

## 2017-08-03 DIAGNOSIS — K92.0 GASTROINTESTINAL HEMORRHAGE WITH HEMATEMESIS: Primary | ICD-10-CM

## 2017-08-03 DIAGNOSIS — K22.89 ESOPHAGEAL THICKENING: ICD-10-CM

## 2017-08-03 DIAGNOSIS — D64.9 ANEMIA, UNSPECIFIED TYPE: ICD-10-CM

## 2017-08-03 DIAGNOSIS — Z86.73 HISTORY OF CVA (CEREBROVASCULAR ACCIDENT): ICD-10-CM

## 2017-08-03 DIAGNOSIS — C82.96 FOLLICULAR LYMPHOMA OF INTRAPELVIC LYMPH NODES, UNSPECIFIED FOLLICULAR LYMPHOMA TYPE: ICD-10-CM

## 2017-08-03 DIAGNOSIS — I10 ESSENTIAL HYPERTENSION: ICD-10-CM

## 2017-08-03 PROCEDURE — 1159F MED LIST DOCD IN RCRD: CPT | Mod: S$GLB,,, | Performed by: INTERNAL MEDICINE

## 2017-08-03 PROCEDURE — 99499 UNLISTED E&M SERVICE: CPT | Mod: S$GLB,,, | Performed by: INTERNAL MEDICINE

## 2017-08-03 PROCEDURE — 3008F BODY MASS INDEX DOCD: CPT | Mod: S$GLB,,, | Performed by: INTERNAL MEDICINE

## 2017-08-03 PROCEDURE — 1126F AMNT PAIN NOTED NONE PRSNT: CPT | Mod: S$GLB,,, | Performed by: INTERNAL MEDICINE

## 2017-08-03 PROCEDURE — 99214 OFFICE O/P EST MOD 30 MIN: CPT | Mod: S$GLB,,, | Performed by: INTERNAL MEDICINE

## 2017-08-03 PROCEDURE — 99999 PR PBB SHADOW E&M-EST. PATIENT-LVL III: CPT | Mod: PBBFAC,,, | Performed by: INTERNAL MEDICINE

## 2017-08-03 RX ORDER — PROCHLORPERAZINE MALEATE 10 MG
10 TABLET ORAL EVERY 6 HOURS PRN
Qty: 30 TABLET | Refills: 3 | Status: SHIPPED | OUTPATIENT
Start: 2017-08-03

## 2017-08-03 NOTE — PROGRESS NOTES
Chief complaint: Follow-up from the hospital    81-year-old white female with multiple medical problems .  Hospital records reviewed.  Appears to have been admitted for GI bleed.  She had an EGD and a CT scan.  She had some thickening of the distal esophagus but the biopsies did not reveal malignancy.  She did not have any dysphagia.  She simply had emesis of black material.  She has not had any recurrent signs of bleeding.  A little bit of trouble swallowing in the upper throat since the EGD was never had any lower dysphagia.  She has a problem with everything tasting too salty in the past 6 months.  She had her tongue surgery 5 years ago.  She and her  went back arguing about this issue.  She eats a lot of Cheetos and her  think she needs to much but she minimizes.  We discussed checking her sugars before and after eating the Cheetos since I would recommend that she reduce that starch.  Looks like there was plans for her to see a gastroenterologist but no appointment yet.  I'll put in that referral discussing that if indeed she developed signs of bleeding or dysphagia May need workup sooner.  We discussed the potential for malignancy and is not quite sure how they want to follow this up either with repeat EGD or CT scan.  Perhaps even upper GI and so forth.  Here with her .        Hospital records reviewed and  and patient counseled at great length today.Total time over 25 minutes with over 50% counseling.  Hospital Course:   Pt admitted to observation for GI bleed. GI consulted in the ED; awaiting recs. ASA and Plavix held on admit. NPO after midnight.  7/10: Hgb 10.2->8.7. H/H q6 hours; blood consent signed and placed in chart. Per GI, CLD and NPO at [facility] for EGD tomorrow. Will continue Protonix 40 mg BID per recs.   7/11: H/H stable. EGD shows mucosal changes in the duodenum with stenosis, suspicious for malignancy; biopsied; may be causing slight gastric outlet obstruction; no  sources for bleeding seen. GI rec CT abd/pelvis with pancreas protocol with contrast. Resumed ASA today per recs. Plan to resume Plavix tomorrow.  7/12: CT abd/pelvis shows circumferential thickening of the distal thoracic esophagus extending to the gastroesophageal junction; malignancy is not excluded. Per GI, pt to follow up with clinic for biopsy results. Pt discharged home with resumed ASA and plavix. Placed amb ref to GI. Pt to follow up with PCP as an outpatient.     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 Shot monthly.  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  13) iron deficiency anemia. 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  Vertebral compression fracture treated with vertebral plasty   19.  Prevnar 13 2016  20.  Pelvic fracture December 2016  21.  GI bleed 2017, thickening of the distal esophagus on CT scan, EGD biopsy benign     Allergies: Prednisone, hydrocodone, Lotensin, oxybutynin     Social history: The patient is a nonsmoker who is retired. The patient is . She  rarely drank alcohol.     Family history: There is a significant family history of cardiac, pulmonary, and endocrine disease. There is no family history of head and neck malignancy.      abdomen soft and benign    Marina was seen today for transitional care.    Diagnoses and all orders for this visit:    Gastrointestinal hemorrhage with hematemesis , no further signs of symptoms of bleeding  -     Ambulatory referral to Gastroenterology    Esophageal thickening, needs continued follow-up to rule out malignancy  -     Ambulatory referral to Gastroenterology    Essential hypertension, chronic and stable    Follicular lymphoma of intrapelvic lymph nodes, unspecified follicular lymphoma type, follows with oncology    Diabetes mellitus with neurological manifestations, uncontrolled, likely some dietary issues that need improvement, she is on insulin    History of CVA (cerebrovascular accident)    Anemia, unspecified type, appears to be chronic    Other orders  -     prochlorperazine (COMPAZINE) 10 MG tablet; Take 1 tablet (10 mg total) by mouth every 6 (six) hours as needed.

## 2017-08-03 NOTE — TELEPHONE ENCOUNTER
----- Message from Shana Sesay sent at 8/3/2017 12:17 PM CDT -----  Contact: self  Pt calling to discuss swollen feet. Please call [phone]

## 2017-08-04 ENCOUNTER — TELEPHONE (OUTPATIENT)
Dept: ENDOCRINOLOGY | Facility: CLINIC | Age: 82
End: 2017-08-04

## 2017-08-04 NOTE — TELEPHONE ENCOUNTER
Left message on patient's voicemail to return call to clinic regarding scheduling Diabetes management appointment with Kiki Kaur NP for elevated A1c. Waiting to hear back.

## 2017-08-07 ENCOUNTER — TELEPHONE (OUTPATIENT)
Dept: FAMILY MEDICINE | Facility: CLINIC | Age: 82
End: 2017-08-07

## 2017-08-07 NOTE — TELEPHONE ENCOUNTER
Patient called and scheduled diabetes management appointment with Kiki Kaur NP on Tues 8/15/17 at 2:00p. Patient verbalized understanding. Reminder letter mailed.

## 2017-08-07 NOTE — TELEPHONE ENCOUNTER
----- Message from Angela Mcknight sent at 8/7/2017  9:10 AM CDT -----  Contact: Self  Pt states that Dr. Jimenez told her to stop taking her bp meds. Pt just wants to know what to do next. Please call [phone]

## 2017-08-07 NOTE — TELEPHONE ENCOUNTER
Stated, was told during to stop taking her medication (Norvasc) after reporting the swelling in her feet. Patient did not take medication over the weekend. Was also told to monitor blood pressure during this time. She was out of town and did not take blood pressure at all. Reporting feet still swollen. One reading only: 136/63;91. Please advise.

## 2017-08-07 NOTE — TELEPHONE ENCOUNTER
Informed patient to elevate extremities and monitor blood pressure at initial ENC. Will call back to reiterate CONTINUE TO STOP MEDICATION. Verbalized understanding. Patient will only call back if swelling did not go down.

## 2017-08-07 NOTE — TELEPHONE ENCOUNTER
Okay, confirm patient will stay off Norvasc and monitor blood pressure but also emphasized to patient she does need to elevate her legs above the waist level.  The fluid to go away and it usually takes several days

## 2017-08-09 ENCOUNTER — OFFICE VISIT (OUTPATIENT)
Dept: FAMILY MEDICINE | Facility: CLINIC | Age: 82
End: 2017-08-09
Payer: MEDICARE

## 2017-08-09 VITALS
DIASTOLIC BLOOD PRESSURE: 58 MMHG | OXYGEN SATURATION: 97 % | SYSTOLIC BLOOD PRESSURE: 136 MMHG | WEIGHT: 136 LBS | TEMPERATURE: 99 F | HEIGHT: 58 IN | BODY MASS INDEX: 28.55 KG/M2 | HEART RATE: 85 BPM

## 2017-08-09 DIAGNOSIS — N39.0 URINARY TRACT INFECTION WITHOUT HEMATURIA, SITE UNSPECIFIED: Primary | ICD-10-CM

## 2017-08-09 DIAGNOSIS — I10 ESSENTIAL HYPERTENSION: ICD-10-CM

## 2017-08-09 DIAGNOSIS — E11.59 HYPERTENSION ASSOCIATED WITH DIABETES: ICD-10-CM

## 2017-08-09 DIAGNOSIS — I15.2 HYPERTENSION ASSOCIATED WITH DIABETES: ICD-10-CM

## 2017-08-09 DIAGNOSIS — R60.9 EDEMA, UNSPECIFIED TYPE: ICD-10-CM

## 2017-08-09 PROCEDURE — 1126F AMNT PAIN NOTED NONE PRSNT: CPT | Mod: S$GLB,,, | Performed by: INTERNAL MEDICINE

## 2017-08-09 PROCEDURE — 99499 UNLISTED E&M SERVICE: CPT | Mod: S$GLB,,, | Performed by: INTERNAL MEDICINE

## 2017-08-09 PROCEDURE — 3078F DIAST BP <80 MM HG: CPT | Mod: S$GLB,,, | Performed by: INTERNAL MEDICINE

## 2017-08-09 PROCEDURE — 3008F BODY MASS INDEX DOCD: CPT | Mod: S$GLB,,, | Performed by: INTERNAL MEDICINE

## 2017-08-09 PROCEDURE — 1159F MED LIST DOCD IN RCRD: CPT | Mod: S$GLB,,, | Performed by: INTERNAL MEDICINE

## 2017-08-09 PROCEDURE — 99999 PR PBB SHADOW E&M-EST. PATIENT-LVL III: CPT | Mod: PBBFAC,,, | Performed by: INTERNAL MEDICINE

## 2017-08-09 PROCEDURE — 3075F SYST BP GE 130 - 139MM HG: CPT | Mod: S$GLB,,, | Performed by: INTERNAL MEDICINE

## 2017-08-09 PROCEDURE — 99214 OFFICE O/P EST MOD 30 MIN: CPT | Mod: S$GLB,,, | Performed by: INTERNAL MEDICINE

## 2017-08-09 RX ORDER — NITROFURANTOIN 25; 75 MG/1; MG/1
100 CAPSULE ORAL 2 TIMES DAILY
Qty: 10 CAPSULE | Refills: 0 | Status: SHIPPED | OUTPATIENT
Start: 2017-08-09 | End: 2017-08-14

## 2017-08-09 NOTE — PROGRESS NOTES
Chief complaint: Follow-up possible bladder infection, follow-up on blood pressure    81-year-old white female with multiple medical problems .  She went out of town this weekend in [place].  Over the weekend she developed dysuria, significant urinary frequency and incontinence, more yellow urine with increased odor.  Her typical for UTI.  History of complicated UTIs.  We will treat with Macrobid.  She also came in this week with some bilateral lower extremity edema up to the knees.  Not a chronic problem.  No other symptoms of CHF.  Her blood pressure has been running normal so we told her to hold the amlodipine 5 mg and the edema has significant improved.  Today it's only about trace edema few inches above the ankles.  When her ankles were swollen she does report that it made her ankles hurt.  Blood pressures has been monitored and it has consistently now gone up into the 140s.  She is on losartan 50 and we discussed in increased to 100.  I reviewed labs and her renal function has been normal with no potassium issues.  I will send the 100 mg losartan to her pharmacy.  Also reviewed and it doesn't appear that her GI appointment has been set up since the referral was placed at her recent appointment.  I will send a message to the referral coordinators.  Issues related to her GI issues as below and patient counseled regarding all these issuesTotal time over 25 minutes with over 50% counseling.      Hospital records reviewed.  Appears to have been admitted for GI bleed.  She had an EGD and a CT scan.  She had some thickening of the distal esophagus but the biopsies did not reveal malignancy.  She did not have any dysphagia.  She simply had emesis of black material.  She has not had any recurrent signs of bleeding.  A little bit of trouble swallowing in the upper throat since the EGD was never had any lower dysphagia.  She has a problem with everything tasting too salty in the past 6 months.  She had her tongue  surgery 5 years ago.  She and her  went back arguing about this issue.  She eats a lot of Cheetos and her  think she needs to much but she minimizes.  We discussed checking her sugars before and after eating the Cheetos since I would recommend that she reduce that starch.  Looks like there was plans for her to see a gastroenterologist but no appointment yet.  I'll put in that referral discussing that if indeed she developed signs of bleeding or dysphagia May need workup sooner.  We discussed the potential for malignancy and is not quite sure how they want to follow this up either with repeat EGD or CT scan.  Perhaps even upper GI and so forth.  Hospital Course:   Pt admitted to observation for GI bleed. GI consulted in the ED; awaiting recs. ASA and Plavix held on admit. NPO after midnight.  7/10: Hgb 10.2->8.7. H/H q6 hours; blood consent signed and placed in chart. Per GI, [facility]D and NPO at [facility] for [facility] tomorrow. Will continue Protonix 40 mg BID per recs.   7/11: H/H stable. EGD shows mucosal changes in the duodenum with stenosis, suspicious for malignancy; biopsied; may be causing slight gastric outlet obstruction; no sources for bleeding seen. GI rec CT abd/pelvis with pancreas protocol with contrast. Resumed ASA today per recs. Plan to resume Plavix tomorrow.  7/12: CT abd/pelvis shows circumferential thickening of the distal thoracic esophagus extending to the gastroesophageal junction; malignancy is not excluded. Per GI, pt to follow up with clinic for biopsy results. Pt discharged home with resumed ASA and plavix. Placed amb ref to GI. Pt to follow up with PCP as an outpatient.     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 Shot monthly.  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  13) iron deficiency anemia. 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  Vertebral compression fracture treated with vertebral plasty   19.  Prevnar 13 2016  20.  Pelvic fracture December 2016  21.  GI bleed 2017, thickening of the distal esophagus on CT scan, EGD biopsy benign     Allergies: Prednisone, hydrocodone, Lotensin, oxybutynin     Social history: The patient is a nonsmoker who is retired. The patient is . She rarely drank alcohol.     Family history: There is a significant family history of cardiac, pulmonary, and endocrine disease. There is no family history of head and neck malignancy.        Vital signs as above, general no distress, using a rolling walker, edema examined as above      Marina was seen today for urinary tract infection and blood pressure check.    Diagnoses and all orders for this visit:    Urinary tract infection without hematuria, site unspecified, appears to be a simple cystitis, defer Pneumovax today so as to not confuse issues regarding malaise and development of fever for which if she worsens she should go to the emergency room    Hypertension associated with diabetes, uncontrolled, increase losartan, side effects with Norvasc  -     losartan (COZAAR) 100 MG Tab; TAKE 1 TABLET ONE TIME DAILY    Essential hypertension,  rising some medications adjusted    Edema, unspecified type, partially secondary to the Norvasc so we will try to avoid, discussed elevation    Diabetes mellitus with neurological manifestations, uncontrolled, needs to see optometry  -     Ambulatory referral to Optometry    Other orders  -     Cancel: Pneumococcal Polysaccharide Vaccine (23 Valent) (SQ/IM)  -     nitrofurantoin, macrocrystal-monohydrate, (MACROBID) 100 MG capsule; Take 1 capsule (100 mg total) by mouth 2 (two) times daily.

## 2017-08-10 ENCOUNTER — TELEPHONE (OUTPATIENT)
Dept: FAMILY MEDICINE | Facility: CLINIC | Age: 82
End: 2017-08-10

## 2017-08-11 NOTE — TELEPHONE ENCOUNTER
Patient is scheduled for optometry and gastro. The gastro appointment is 9/21 at 1:30 with Dr Mccloud. Did you want her seen sooner? If so I would need to message the staff over there and see if they can fit her in. That was the first available.

## 2017-08-11 NOTE — TELEPHONE ENCOUNTER
No problem, figured it would always be several weeks but this is an issue she needs a GI evaluation on, nothing urgent, thanks

## 2017-08-12 RX ORDER — BLOOD SUGAR DIAGNOSTIC
STRIP MISCELLANEOUS
Qty: 300 STRIP | Refills: 11 | Status: SHIPPED | OUTPATIENT
Start: 2017-08-12 | End: 2018-09-04 | Stop reason: SDUPTHER

## 2017-08-17 ENCOUNTER — OFFICE VISIT (OUTPATIENT)
Dept: OPTOMETRY | Facility: CLINIC | Age: 82
End: 2017-08-17
Payer: MEDICARE

## 2017-08-17 DIAGNOSIS — H25.11 NUCLEAR SCLEROSIS, RIGHT: ICD-10-CM

## 2017-08-17 DIAGNOSIS — Z96.1 PSEUDOPHAKIA, LEFT EYE: ICD-10-CM

## 2017-08-17 DIAGNOSIS — H52.7 REFRACTIVE ERROR: ICD-10-CM

## 2017-08-17 DIAGNOSIS — H16.223 KERATOCONJUNCTIVITIS SICCA NOT SPECIFIED AS SJOGREN'S, BILATERAL: ICD-10-CM

## 2017-08-17 DIAGNOSIS — E11.9 TYPE 2 DIABETES MELLITUS WITHOUT RETINOPATHY: Primary | ICD-10-CM

## 2017-08-17 PROCEDURE — 92014 COMPRE OPH EXAM EST PT 1/>: CPT | Mod: S$GLB,,, | Performed by: OPTOMETRIST

## 2017-08-17 PROCEDURE — 99499 UNLISTED E&M SERVICE: CPT | Mod: S$GLB,,, | Performed by: OPTOMETRIST

## 2017-08-17 PROCEDURE — 99999 PR PBB SHADOW E&M-EST. PATIENT-LVL II: CPT | Mod: PBBFAC,,, | Performed by: OPTOMETRIST

## 2017-08-17 NOTE — PROGRESS NOTES
"Subjective:       Patient ID: Marina Trimble is a 81 y.o. female      Chief Complaint   Patient presents with    Diabetic Eye Exam     IDDM 2; A1c = 8.1 (7/9/17), LBS 62 this AM     History of Present Illness  Dls:  8/9/16 Dr. Mcginnis    Pt here for diabetic eye exam.   Pt states no changes in vision. Pt wears single vision glasses for   reading. Declines refraction. Does not want glasses today. Pt c/o dry eyes   ou no tearing no itching no burning no pain no ha's no floaters.     Eye meds:   restasis ou bid   otc gtts ou prn     Hemoglobin A1C       Date                     Value               Ref Range           Status                07/09/2017               8.1 (H)             4.0 - 5.6 %         Final                  12/23/2016               9.2 (H)             4.5 - 6.2 %         Final                  08/25/2016               9.2 (H)             4.5 - 6.2 %         Final             ----------     Assessment/Plan:     1. Type 2 diabetes mellitus without retinopathy  No diabetic retinopathy. Educated patient on importance of good blood sugar control, compliance with meds, and follow up care with PCP. Return in 1 year for dilated eye exam, sooner PRN.    2. Nuclear sclerosis, right  Educated patient on the presence of cataracts and effects on vision, including clouded, blurred or dim vision, increasing difficulty with vision at night, sensitivity to light and glare, need for brighter light for reading and other activities, and seeing "halos" around lights. Recheck in one year.    3. Keratoconjunctivitis sicca not specified as Sjogren's, bilateral  Continue restasis BID OU    4. Pseudophakia, left eye  Well-centered. Clear.     5. Refractive error  Pt declined refraction. Does not want glasses. Happy with OTC readers.     Return in about 1 year (around 8/17/2018) for Diabetic Eye Exam, Cataract check.       "

## 2017-08-17 NOTE — LETTER
August 17, 2017      Jose Jimenez MD  [address]  [place] [address]           [facility]  [address] [place] [address]  Phone: [phone]  Fax: [phone]          Patient: Marina Trimble   MR Number: [id number]   YOB: 1935   Date of Visit: 8/17/2017       Dear Dr. Jose Jimenez:    Thank you for referring Marina Trimble to me for evaluation. Attached you will find relevant portions of my assessment and plan of care.    If you have questions, please do not hesitate to call me. I look forward to following Marina Trimble along with you.    Sincerely,    Regla Fitzgerald, OD    Enclosure  CC:  No Recipients    If you would like to receive this communication electronically, please contact [email or web address] or [phone] to request more information on [facility] [employer] access.    For providers and/or their staff who would like to refer a patient to [facility], please contact us through our one-stop-shop provider referral line, [facility], at [phone].    If you feel you have received this communication in error or would no longer like to receive these types of communications, please e-mail [email or web address]

## 2017-09-01 ENCOUNTER — TELEPHONE (OUTPATIENT)
Dept: HEMATOLOGY/ONCOLOGY | Facility: CLINIC | Age: 82
End: 2017-09-01

## 2017-09-01 ENCOUNTER — LAB VISIT (OUTPATIENT)
Dept: LAB | Facility: HOSPITAL | Age: 82
End: 2017-09-01
Attending: INTERNAL MEDICINE
Payer: MEDICARE

## 2017-09-01 DIAGNOSIS — E56.1 VITAMIN K DEFICIENCY: ICD-10-CM

## 2017-09-01 DIAGNOSIS — D64.9 ANEMIA: ICD-10-CM

## 2017-09-01 DIAGNOSIS — D50.0 ANEMIA DUE TO CHRONIC BLOOD LOSS: ICD-10-CM

## 2017-09-01 DIAGNOSIS — D50.0 IRON DEFICIENCY ANEMIA DUE TO CHRONIC BLOOD LOSS: ICD-10-CM

## 2017-09-01 LAB
APTT BLDCRRT: 22.4 SEC
ERYTHROCYTE [DISTWIDTH] IN BLOOD BY AUTOMATED COUNT: 15.9 %
FERRITIN SERPL-MCNC: 9 NG/ML
HCT VFR BLD AUTO: 31.7 %
HGB BLD-MCNC: 9.2 G/DL
MCH RBC QN AUTO: 23.2 PG
MCHC RBC AUTO-ENTMCNC: 29 G/DL
MCV RBC AUTO: 80 FL
NEUTROPHILS # BLD AUTO: 2.8 K/UL
PLATELET # BLD AUTO: 354 K/UL
PMV BLD AUTO: 10 FL
RBC # BLD AUTO: 3.97 M/UL
WBC # BLD AUTO: 5.18 K/UL

## 2017-09-01 PROCEDURE — 82728 ASSAY OF FERRITIN: CPT

## 2017-09-01 PROCEDURE — 85027 COMPLETE CBC AUTOMATED: CPT | Mod: PO

## 2017-09-01 PROCEDURE — 85730 THROMBOPLASTIN TIME PARTIAL: CPT

## 2017-09-01 NOTE — TELEPHONE ENCOUNTER
----- Message from Tim Hodges Jr., MD sent at 9/1/2017  3:04 PM CDT -----  She has developed iron def. Anemia again.  Needs Injectafer--will order.  Let her know    Patient was told that she needs Injectafer. We will schedule it as soon as her insurance approve it.

## 2017-09-06 RX ORDER — ACETAMINOPHEN AND CODEINE PHOSPHATE 300; 60 MG/1; MG/1
1 TABLET ORAL EVERY 6 HOURS PRN
Qty: 90 TABLET | Refills: 3 | Status: SHIPPED | OUTPATIENT
Start: 2017-09-06 | End: 2018-04-04 | Stop reason: SDUPTHER

## 2017-09-07 DIAGNOSIS — E11.9 TYPE 2 DIABETES MELLITUS WITHOUT COMPLICATION: ICD-10-CM

## 2017-09-11 ENCOUNTER — TELEPHONE (OUTPATIENT)
Dept: HEMATOLOGY/ONCOLOGY | Facility: CLINIC | Age: 82
End: 2017-09-11

## 2017-09-11 DIAGNOSIS — M54.50 SPINE PAIN, LUMBOSACRAL: Primary | ICD-10-CM

## 2017-09-11 RX ORDER — HEPARIN 100 UNIT/ML
500 SYRINGE INTRAVENOUS
Status: CANCELLED | OUTPATIENT
Start: 2017-09-19

## 2017-09-11 RX ORDER — HEPARIN 100 UNIT/ML
500 SYRINGE INTRAVENOUS
Status: CANCELLED | OUTPATIENT
Start: 2017-09-12

## 2017-09-11 RX ORDER — SODIUM CHLORIDE 0.9 % (FLUSH) 0.9 %
10 SYRINGE (ML) INJECTION
Status: CANCELLED | OUTPATIENT
Start: 2017-09-12

## 2017-09-11 RX ORDER — SODIUM CHLORIDE 0.9 % (FLUSH) 0.9 %
10 SYRINGE (ML) INJECTION
Status: CANCELLED | OUTPATIENT
Start: 2017-09-19

## 2017-09-11 NOTE — TELEPHONE ENCOUNTER
----- Message from Shana Thayer RN sent at 9/11/2017  2:00 PM CDT -----  Contact: pt  Please schedule appointment for Injectafer x2 week apart.  ----- Message -----  From: Keiry Hodges  Sent: 9/11/2017   1:34 PM  To: Carroll DELGADO Jr Staff    Pt contact [phone]    Pt is anemic and was told that  was suppose to get clearance from the insurance company about a week ago and she have yet to hear anything    Please call pt with updates

## 2017-09-12 ENCOUNTER — INFUSION (OUTPATIENT)
Dept: INFUSION THERAPY | Facility: HOSPITAL | Age: 82
End: 2017-09-12
Attending: INTERNAL MEDICINE
Payer: MEDICARE

## 2017-09-12 VITALS
TEMPERATURE: 99 F | DIASTOLIC BLOOD PRESSURE: 63 MMHG | HEART RATE: 81 BPM | SYSTOLIC BLOOD PRESSURE: 138 MMHG | RESPIRATION RATE: 17 BRPM

## 2017-09-12 DIAGNOSIS — D50.0 IRON DEFICIENCY ANEMIA DUE TO CHRONIC BLOOD LOSS: Primary | ICD-10-CM

## 2017-09-12 DIAGNOSIS — K92.0 GASTROINTESTINAL HEMORRHAGE WITH HEMATEMESIS: ICD-10-CM

## 2017-09-12 PROCEDURE — 63600175 PHARM REV CODE 636 W HCPCS: Performed by: INTERNAL MEDICINE

## 2017-09-12 PROCEDURE — 25000003 PHARM REV CODE 250: Performed by: INTERNAL MEDICINE

## 2017-09-12 PROCEDURE — 96365 THER/PROPH/DIAG IV INF INIT: CPT

## 2017-09-12 RX ORDER — SODIUM CHLORIDE 0.9 % (FLUSH) 0.9 %
10 SYRINGE (ML) INJECTION
Status: DISCONTINUED | OUTPATIENT
Start: 2017-09-12 | End: 2017-09-12 | Stop reason: HOSPADM

## 2017-09-12 RX ADMIN — FERRIC CARBOXYMALTOSE INJECTION 750 MG: 50 INJECTION, SOLUTION INTRAVENOUS at 01:09

## 2017-09-12 RX ADMIN — SODIUM CHLORIDE: 9 INJECTION, SOLUTION INTRAVENOUS at 01:09

## 2017-09-12 NOTE — PLAN OF CARE
Problem: Patient Care Overview  Goal: Plan of Care Review  Outcome: Ongoing (interventions implemented as appropriate)  Pt tolerated iron infusion without issue, avs given, rtc 9/19/17, no distress noted upon d/c to home

## 2017-09-12 NOTE — PLAN OF CARE
Problem: Anemia (Adult)  Goal: Identify Related Risk Factors and Signs and Symptoms  Related risk factors and signs and symptoms are identified upon initiation of Human [employer] Clinical Practice Guideline (CPG)  Outcome: Ongoing (interventions implemented as appropriate)  Pt here for injectafer infusion, labs, hx, meds, allergies reviewed, reclined in chair, continue to monitor

## 2017-09-19 ENCOUNTER — INFUSION (OUTPATIENT)
Dept: INFUSION THERAPY | Facility: HOSPITAL | Age: 82
End: 2017-09-19
Attending: INTERNAL MEDICINE
Payer: MEDICARE

## 2017-09-19 VITALS
SYSTOLIC BLOOD PRESSURE: 134 MMHG | TEMPERATURE: 98 F | DIASTOLIC BLOOD PRESSURE: 62 MMHG | RESPIRATION RATE: 20 BRPM | HEART RATE: 65 BPM | OXYGEN SATURATION: 94 %

## 2017-09-19 DIAGNOSIS — D50.0 IRON DEFICIENCY ANEMIA DUE TO CHRONIC BLOOD LOSS: Primary | ICD-10-CM

## 2017-09-19 DIAGNOSIS — K92.0 GASTROINTESTINAL HEMORRHAGE WITH HEMATEMESIS: ICD-10-CM

## 2017-09-19 PROCEDURE — 96365 THER/PROPH/DIAG IV INF INIT: CPT

## 2017-09-19 PROCEDURE — 63600175 PHARM REV CODE 636 W HCPCS: Performed by: INTERNAL MEDICINE

## 2017-09-19 PROCEDURE — 25000003 PHARM REV CODE 250: Performed by: INTERNAL MEDICINE

## 2017-09-19 RX ORDER — HEPARIN 100 UNIT/ML
500 SYRINGE INTRAVENOUS
Status: DISCONTINUED | OUTPATIENT
Start: 2017-09-19 | End: 2017-09-19 | Stop reason: HOSPADM

## 2017-09-19 RX ORDER — SODIUM CHLORIDE 0.9 % (FLUSH) 0.9 %
10 SYRINGE (ML) INJECTION
Status: DISCONTINUED | OUTPATIENT
Start: 2017-09-19 | End: 2017-09-19 | Stop reason: HOSPADM

## 2017-09-19 RX ADMIN — SODIUM CHLORIDE: 0.9 INJECTION, SOLUTION INTRAVENOUS at 01:09

## 2017-09-19 RX ADMIN — FERRIC CARBOXYMALTOSE INJECTION 750 MG: 50 INJECTION, SOLUTION INTRAVENOUS at 01:09

## 2017-09-19 NOTE — PLAN OF CARE
Problem: Patient Care Overview  Goal: Plan of Care Review  Outcome: Ongoing (interventions implemented as appropriate)  Patient tolerated injectafer well and was monitored in clinic for a half an hour post infusion;no s/s reactions. Patient given AVS;instructed to call MD with any problems.

## 2017-09-19 NOTE — PLAN OF CARE
Problem: Patient Care Overview  Goal: Individualization & Mutuality  Outcome: Ongoing (interventions implemented as appropriate)  Patient arrived to clinic,accompanied by ,no s/s distress,c/o chronic back pain but says she takes something for it;patient pending injectafer number 2;will continue to monitor.

## 2017-09-21 ENCOUNTER — TELEPHONE (OUTPATIENT)
Dept: ENDOSCOPY | Facility: HOSPITAL | Age: 82
End: 2017-09-21

## 2017-09-21 ENCOUNTER — OFFICE VISIT (OUTPATIENT)
Dept: GASTROENTEROLOGY | Facility: CLINIC | Age: 82
End: 2017-09-21
Payer: MEDICARE

## 2017-09-21 ENCOUNTER — OFFICE VISIT (OUTPATIENT)
Dept: PODIATRY | Facility: CLINIC | Age: 82
End: 2017-09-21
Payer: MEDICARE

## 2017-09-21 VITALS
DIASTOLIC BLOOD PRESSURE: 60 MMHG | HEIGHT: 58 IN | SYSTOLIC BLOOD PRESSURE: 132 MMHG | WEIGHT: 136 LBS | BODY MASS INDEX: 28.55 KG/M2

## 2017-09-21 VITALS
SYSTOLIC BLOOD PRESSURE: 152 MMHG | DIASTOLIC BLOOD PRESSURE: 64 MMHG | HEART RATE: 85 BPM | WEIGHT: 129.44 LBS | HEIGHT: 58 IN | BODY MASS INDEX: 27.17 KG/M2

## 2017-09-21 DIAGNOSIS — B35.1 NAIL DERMATOPHYTOSIS: ICD-10-CM

## 2017-09-21 DIAGNOSIS — L90.9 FAT PAD ATROPHY OF FOOT: ICD-10-CM

## 2017-09-21 DIAGNOSIS — E11.49 TYPE II DIABETES MELLITUS WITH NEUROLOGICAL MANIFESTATIONS: Primary | ICD-10-CM

## 2017-09-21 DIAGNOSIS — K31.5 DUODENAL STENOSIS: Primary | ICD-10-CM

## 2017-09-21 DIAGNOSIS — D51.0 PERNICIOUS ANEMIA: ICD-10-CM

## 2017-09-21 PROCEDURE — 99214 OFFICE O/P EST MOD 30 MIN: CPT | Mod: S$GLB,,, | Performed by: INTERNAL MEDICINE

## 2017-09-21 PROCEDURE — 99999 PR PBB SHADOW E&M-EST. PATIENT-LVL III: CPT | Mod: PBBFAC,,, | Performed by: INTERNAL MEDICINE

## 2017-09-21 PROCEDURE — 1125F AMNT PAIN NOTED PAIN PRSNT: CPT | Mod: S$GLB,,, | Performed by: INTERNAL MEDICINE

## 2017-09-21 PROCEDURE — 3078F DIAST BP <80 MM HG: CPT | Mod: S$GLB,,, | Performed by: INTERNAL MEDICINE

## 2017-09-21 PROCEDURE — 99999 PR PBB SHADOW E&M-EST. PATIENT-LVL III: CPT | Mod: PBBFAC,,, | Performed by: PODIATRIST

## 2017-09-21 PROCEDURE — 3008F BODY MASS INDEX DOCD: CPT | Mod: S$GLB,,, | Performed by: INTERNAL MEDICINE

## 2017-09-21 PROCEDURE — 3074F SYST BP LT 130 MM HG: CPT | Mod: S$GLB,,, | Performed by: INTERNAL MEDICINE

## 2017-09-21 PROCEDURE — 99499 UNLISTED E&M SERVICE: CPT | Mod: S$GLB,,, | Performed by: INTERNAL MEDICINE

## 2017-09-21 PROCEDURE — 11721 DEBRIDE NAIL 6 OR MORE: CPT | Mod: Q9,S$GLB,, | Performed by: PODIATRIST

## 2017-09-21 PROCEDURE — 99499 UNLISTED E&M SERVICE: CPT | Mod: S$GLB,,, | Performed by: PODIATRIST

## 2017-09-21 PROCEDURE — 1159F MED LIST DOCD IN RCRD: CPT | Mod: S$GLB,,, | Performed by: INTERNAL MEDICINE

## 2017-09-21 NOTE — PROGRESS NOTES
REASON FOR VISIT:  Indigestion.    HISTORY OF PRESENT ILLNESS:  Ms. Andrade had presented to the hospital on   07/05/2017 vomiting coffee-ground looking material and has been complaining of   indigestion.  Since the presentation, she underwent an upper endoscopy for   suspected upper GI bleed and vomiting and was found to have a sliding hiatal   hernia and luminal narrowing in the duodenum around the third portion suspicious   for malignancy; however, the biopsies were unremarkable and a CT scan that was   subsequently done at [facility] did not reveal any pancreatic   lesion.  She since discharge has not been having any more episodes of vomiting,   although she does have discomfort and sensation of nausea and indigestion.  It   is not clear if she is taking omeprazole or pantoprazole regularly.  She does   take aspirin and Plavix for coronary artery disease.  Today, we discussed about   relooking with an endoscope into the duodenum to reassess the stenosis and we   will probably repeat multiple biopsies to reevaluate.  No new complaints.    PAST MEDICAL, SURGICAL, SOCIAL AND FAMILY HISTORY:  Reviewed.    MEDICATIONS AND ALLERGIES:  Reviewed.    REVIEW OF SYSTEMS:  CONSTITUTIONAL:  No fever, no chills, decreased appetite and decrease in oral   intake.  EYES:  No visual changes.  ENT:  No odynophagia or hoarseness of voice.  CARDIOVASCULAR:  No angina or palpitation.  RESPIRATORY:  No shortness of breath or wheezing.  GASTROINTESTINAL:  See HPI.    PHYSICAL EXAMINATION:  VITAL SIGNS:  See EPIC.  GENERAL:  Awake, alert and oriented x3, in no acute distress.  NECK:  No carotid bruits.  No cervical adenopathy appreciated.  ABDOMEN:  Obese, soft, nontender, nondistended.  Examination limited because of   kyphoscoliosis.  Bowel sounds are normal.  No abdominal bruits heard.    IMPRESSION:  Recent episode of emesis with coffee-ground looking material with   concern for duodenal  stenosis.    RECOMMENDATIONS:  1.  Repeat endoscopy with biopsy of the duodenum.  2.  We may require a small bowel follow-through if the above investigations are   unrevealing.      ACT/HN  dd: 09/21/2017 14:08:24 (CDT)  td: 09/22/2017 03:02:16 (CDT)  Doc ID   #[id number]  Job ID #[id number]    CC:

## 2017-09-21 NOTE — PROGRESS NOTES
Subjective:      Patient ID: Marina Trimble is a 81 y.o. female.    Chief Complaint: Diabetes Mellitus (pcp dr. Jimenez 8-9-17); Diabetic Foot Exam; and Nail Care    Marina is a 81 y.o. female who presents to the clinic for evaluation and treatment of high risk feet. 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 The patient's chief complaint is long, thick toenails. This patient has documented high risk feet requiring routine maintenance secondary to diabetes mellitis and those secondary complications of diabetes, as mentioned. She has the one type of anemia; pernious 281.0 that still qualifies for nail care.    PCP: Jose Jimenez MD    Date Last Seen by PCP:   Chief Complaint   Patient presents with    Diabetes Mellitus     pcp dr. Jimenez 8-9-17    Diabetic Foot Exam    Nail Care       Current shoe gear:  SAS shoes    Hemoglobin A1C   Date Value Ref Range Status   07/09/2017 8.1 (H) 4.0 - 5.6 % Final     Comment:     According to ADA guidelines, hemoglobin A1c <7.0% represents  optimal control in non-pregnant diabetic patients. Different  metrics may apply  to specific patient populations.   Standards of Medical Care in Diabetes-2016.  For the purpose of screening for the presence of diabetes:  <5.7%     Consistent with the absence of diabetes  5.7-6.4%  Consistent with increasing risk for diabetes   (prediabetes)  >or=6.5%  Consistent with diabetes  Currently, no consensus exists for use of hemoglobin A1c  for diagnosis of diabetes for children.  This Hemoglobin A1c assay has significant interference with fetal   hemoglobin   (HbF). The results are invalid for patients with abnormal amounts of   HbF,   including those with known Hereditary Persistence   of Fetal Hemoglobin. Heterozygous hemoglobin variants (HbAS, HbAC,   HbAD, HbAE, HbA2) do not significantly interfere with this assay;   however, presence of multiple variants in a sample may impact the %   interference.     12/23/2016 9.2 (H) 4.5 - 6.2 % Final     Comment:     According to ADA guidelines, hemoglobin A1C <7.0% represents  optimal control in non-pregnant diabetic patients.  Different  metrics may apply to specific populations.   Standards of Medical Care in Diabetes - 2016.  For the purpose of screening for the presence of diabetes:  <5.7%     Consistent with the absence of diabetes  5.7-6.4%  Consistent with increasing risk for diabetes   (prediabetes)  >or=6.5%  Consistent with diabetes  Currently no consensus exists for use of hemoglobin A1C  for diagnosis of diabetes for children.     08/25/2016 9.2 (H) 4.5 - 6.2 % Final     Comment:     According to ADA guidelines, hemoglobin A1C <7.0% represents  optimal control in non-pregnant diabetic patients.  Different  metrics may apply to specific populations.   Standards of Medical Care in Diabetes - 2016.  For the purpose of screening for the presence of diabetes:  <5.7%     Consistent with the absence of diabetes  5.7-6.4%  Consistent with increasing risk for diabetes   (prediabetes)  >or=6.5%  Consistent with diabetes  Currently no consensus exists for  "use of hemoglobin A1C  for diagnosis of diabetes for children.       Current Outpatient Prescriptions on File Prior to Visit   Medication Sig Dispense Refill    ACCU-CHEK FASTCLIX Misc USE TO CHECK BLOOD GLUCOSE THREE TIMES DAILY 1000 each 11    ACCU-CHEK SMARTVIEW TEST STRIP Strp USE TO CHECK BLOOD GLUCOSE 3 TIMES DAILY 300 strip 11    acetaminophen (TYLENOL) 500 MG tablet Take 1 tablet (500 mg total) by mouth every 4 (four) hours as needed for Pain.  0    acetaminophen-codeine 300-60mg (TYLENOL #4) 300-60 mg Tab Take 1 tablet by mouth every 6 (six) hours as needed (back pain). 90 tablet 3    amitriptyline (ELAVIL) 10 MG tablet Take 10 mg by mouth once daily.       aspirin (ECOTRIN) 81 MG EC tablet Take 81 mg by mouth once daily.      atorvastatin (LIPITOR) 40 MG tablet TAKE 1 TABLET ONE TIME DAILY 90 tablet 3    BD ULTRA-FINE BEVERLY PEN NEEDLES 32 gauge x 5/32" Ndle USE TWICE DAILY 500 each 12    blood-glucose meter Misc Use ad directed (Accu Check Beverly smartview meter) 1 each 4    clopidogrel (PLAVIX) 75 mg tablet TAKE 1 TABLET ONE TIME DAILY 90 tablet 3    cyanocobalamin 1,000 mcg/mL injection INJECT 1 ML UNDER THE SKIN EVERY 2 WEEKS (Patient taking differently: INJECT 1 ML UNDER THE SKIN EVERY 3 WEEKS) 6 mL 10    insulin aspart (NOVOLOG FLEXPEN) 100 unit/mL InPn pen Inject 4/6/6 units into skin with correction scale 150-200 +1, 201-250 +2, 251-300 +3, 301-350 +4, >350 +5. 6 mL 4    LEVEMIR FLEXTOUCH 100 unit/mL (3 mL) InPn pen INJECT 35 UNITS INTO THE SKIN EVERY EVENING. 30 mL 4    losartan (COZAAR) 100 MG Tab TAKE 1 TABLET ONE TIME DAILY 90 tablet 90    multivitamin (CHEWABLE MULTI VITAMIN) Chew Take 2 tablets by mouth once daily.       naproxen (NAPROSYN) 500 MG tablet Take 1 tablet (500 mg total) by mouth 2 (two) times daily with meals. 11 tablet 0    NOVOFINE 32 32 gauge x 1/4" Ndle USE WITH LEVEMIR PEN EVERY EVENING 100 each 3    omeprazole (PRILOSEC) 40 MG capsule Take 40 mg by mouth once " daily at 6am.      pantoprazole (PROTONIX) 40 MG tablet Take 1 tablet (40 mg total) by mouth once daily. 30 tablet 3    prochlorperazine (COMPAZINE) 10 MG tablet Take 1 tablet (10 mg total) by mouth every 6 (six) hours as needed. 30 tablet 3    PROCTOZONE-HC 2.5 % rectal cream Place rectally 2 (two) times daily. 90 g 12    TRUEPLUS LANCETS 28 gauge Misc USE  TO CHECK BLOOD SUGAR THREE TIMES DAILY 300 each 0    UNABLE TO FIND Places uses Wellpatch daily      vitamin D 1000 units Tab Take 1 tablet (1,000 Units total) by mouth once daily.      VOLTAREN 1 % Gel       cycloSPORINE (RESTASIS) 0.05 % ophthalmic emulsion Place 0.4 mLs (1 drop total) into both eyes 2 (two) times daily. 180 vial 12    triamcinolone acetonide 0.1% (KENALOG) 0.1 % cream Apply topically 2 (two) times daily. 45 g 3     No current facility-administered medications on file prior to visit.      Review of patient's allergies indicates:   Allergen Reactions    Baclofen Nausea And Vomiting and Other (See Comments)     Dizziness    Benazepril      Other reaction(s): Rash,Swelling    Carisoprodol      Other reaction(s): Dysphoria    Desloratadine      Other reaction(s): Nausea  Other reaction(s): Nausea    Hydrocodone      Other reaction(s): disorientation    Methocarbamol      Other reaction(s): Nausea    Omeprazole Nausea And Vomiting    Oxybutynin      Other reaction(s): Hallucinations    Oxycodone Nausea And Vomiting    Tramadol      Other reaction(s): dizziness    Benazepril-hydrochlorothiazide Rash     Other reaction(s): Swelling    Metformin Diarrhea    Prednisone Rash     Past Surgical History:   Procedure Laterality Date    APPENDECTOMY      CATARACT EXTRACTION W/  INTRAOCULAR LENS IMPLANT Left 10/16/14    OS ()    CHOLECYSTECTOMY      COLECTOMY      COLONOSCOPY      EYE SURGERY Bilateral     cataracts    eyelid surgery      HYSTERECTOMY      PARTIAL GLOSSECTOMY  2010    SELECTIVE NECK DISSECTION  2010     tonsilectomy      TONSILLECTOMY       Family History   Problem Relation Age of Onset    Cataracts Mother     Macular degeneration Mother     Blindness Mother     No Known Problems Father     No Known Problems Son     No Known Problems Son     Coronary artery disease      COPD      No Known Problems Sister     No Known Problems Brother     No Known Problems Maternal Aunt     No Known Problems Maternal Uncle     No Known Problems Paternal Aunt     No Known Problems Paternal Uncle     No Known Problems Maternal Grandmother     No Known Problems Maternal Grandfather     No Known Problems Paternal Grandmother     No Known Problems Paternal Grandfather     Skin cancer Neg Hx     Melanoma Neg Hx     Amblyopia Neg Hx     Cancer Neg Hx     Diabetes Neg Hx     Glaucoma Neg Hx     Hypertension Neg Hx     Retinal detachment Neg Hx     Strabismus Neg Hx     Stroke Neg Hx     Thyroid disease Neg Hx     Colon cancer Neg Hx     Esophageal cancer Neg Hx      Social History     Social History    Marital status:      Spouse name: N/A    Number of children: N/A    Years of education: N/A     Occupational History    Not on file.     Social History Main Topics    Smoking status: Never Smoker    Smokeless tobacco: Never Used    Alcohol use No    Drug use: No    Sexual activity: No     Other Topics Concern    Are You Pregnant Or Think You May Be? No    Breast-Feeding No     Social History Narrative    She is . She lives on the [facility].     Review of Systems   Constitution: Negative for chills, fever and weakness.   Cardiovascular: Positive for leg swelling. Negative for claudication.   Respiratory: Positive for shortness of breath. Negative for cough.    Skin: Positive for dry skin and nail changes. Negative for itching and rash.   Musculoskeletal: Positive for arthritis, back pain, falls (december 2016, broke her hip), gout and joint pain. Negative for joint swelling and  "muscle weakness.   Gastrointestinal: Negative for diarrhea, nausea and vomiting.   Neurological: Positive for paresthesias. Negative for numbness and tremors.   Psychiatric/Behavioral: Negative for altered mental status and hallucinations.         Objective:       Vitals:    09/21/17 0949   BP: 132/60   Weight: 61.7 kg (136 lb)   Height: 4' 10" (1.473 m)   PainSc:   8   PainLoc: Back       Physical Exam   Constitutional:   General: Pt. is well-developed, well-nourished, appears stated age, in no acute distress, alert and oriented x 3. No evidence of depression, anxiety, or agitation. Calm, cooperative, and communicative. Appropriate interactions and affect.       Cardiovascular:   Pulses:       Dorsalis pedis pulses are 1+ on the right side, and 1+ on the left side.        Posterior tibial pulses are 1+ on the right side, and 1+ on the left side.   Dorsalis pedis and posterior tibial pulses are diminished Bilaterally. Toes are cool to touch. Feet are warm proximally.There is decreased digital hair. Skin is atrophic   Musculoskeletal:        Right ankle: She exhibits swelling. She exhibits normal range of motion. No tenderness. Achilles tendon exhibits no pain and no defect.        Left ankle: She exhibits swelling. She exhibits normal range of motion. No tenderness. Achilles tendon exhibits no pain and no defect.        Right foot: There is normal range of motion and no tenderness.        Left foot: There is normal range of motion and no tenderness.   Decreased stride, station of gait.  apropulsive toe off.  Increased angle and base of gait.    Patient has hammertoes of digits 2-5 bilateral partially reducible without symptom today.    Visible and palpable bunion without pain at dorsomedial 1st metatarsal head right and left.  Hallux abducted right and left partially reducible, tracks laterally without being track bound.  No ecchymosis, erythema, edema, or cardinal signs infection or signs of trauma same " foot.    Fat pad atrophy to heels and met heads bilateral       Neurological: No sensory deficit.   Parachute-Rodolfo 5.07 monofilament is intact bilateral feet. Sharp/dull sensation is also intact Bilateral feet.       Skin: Skin is warm, dry and intact. No abrasion, no ecchymosis, no lesion and no rash noted. She is not diaphoretic. No cyanosis or erythema. No pallor. Nails show no clubbing.   Skin is atrophic, mild plantar rubor.  No pallor.      No pedal hair noted    Toenails 1-5 bilaterally are elongated by 2-3 mm, thickened by 2-3 mm, discolored/yellowed, dystrophic, brittle with subungual debris.     granuloma noted to lateral right hallux nail border;  Tender to palpation     Interdigital Spaces clean, dry and without evidence of break in skin integrity   Psychiatric: She has a normal mood and affect. Her speech is normal.   Nursing note and vitals reviewed.        Assessment:       Encounter Diagnoses   Name Primary?    Type II diabetes mellitus with neurological manifestations Yes    Pernicious anemia     Fat pad atrophy of foot     Nail dermatophytosis          Plan:       Marina was seen today for diabetes mellitus, diabetic foot exam and nail care.    Diagnoses and all orders for this visit:    Type II diabetes mellitus with neurological manifestations    Pernicious anemia    Fat pad atrophy of foot    Nail dermatophytosis      I counseled the patient on her conditions, their implications and medical management.    - Shoe inspection. Diabetic Foot Education. Patient reminded of the importance of good nutrition and blood sugar control to help prevent podiatric complications of diabetes. Patient instructed on proper foot hygeine. We discussed wearing proper shoe gear, daily foot inspections, never walking without protective shoe gear, never putting sharp instruments to feet.     - With patient's permission, nails were aggressively reduced and debrided x 10 to their soft tissue attachment mechanically  and with electric , removing all offending nail and debris. Patient relates relief following the procedure.  She will continue to monitor the areas daily, inspect her feet, wear protective shoe gear when ambulatory, moisturizer to maintain skin integrity and follow in this office in approximately 3-4 months, sooner p.r.n.

## 2017-09-21 NOTE — TELEPHONE ENCOUNTER
Patient is wanting to schedule an EGD. Is it okay to hold Plavix 5 days prior to procedure? Please advise.

## 2017-09-21 NOTE — LETTER
September 21, 2017      Jose Jimenez MD  [address]  [place] [address]           [facility]  [address]  [place] [address]  Phone: [phone]  Fax: [phone]          Patient: Marina Trimble   MR Number: [id number]   YOB: 1935   Date of Visit: 9/21/2017       Dear Dr. Jose Jimenez:    Thank you for referring Marina Trimble to me for evaluation. Attached you will find relevant portions of my assessment and plan of care.    If you have questions, please do not hesitate to call me. I look forward to following Marina Trimble along with you.    Sincerely,    Terence Mccloud MD    Enclosure  CC:  No Recipients    If you would like to receive this communication electronically, please contact [email or web address] or [phone] to request more information on [employer] Link access.    For providers and/or their staff who would like to refer a patient to [facility], please contact us through our one-stop-shop provider referral line, [facility], at [phone].    If you feel you have received this communication in error or would no longer like to receive these types of communications, please e-mail [email or web address]

## 2017-09-21 NOTE — PATIENT INSTRUCTIONS
Recommend lotions: eucerin, aquaphor, A&D ointment, gold bond for diabetics, sween; urea 40 with aloe (found on amazon.com)    Shoe recommendations: (try 6pm.com, zappos.com , nordstromrack.com, or shoes.com for discounted prices) you can visit DSW shoes in [place] as well    Asics (GT 2000 or gel foundations), new balance, saucony (stabil c3),  Barnes (GTS or Beast or transcend), vionic, propet (tennis shoe)    sofft brand, clarks, crocs, aerosoles, naturalizers, SAS, ecco, ginette, sara geiger, rockports (dress shoes)    Vionic, burkenstocks, fitflops, propet (sandals)    Nike comfort thong sandals, crocs, propet (house shoes)    Nail Home remedy:  Vicks Vapor rub to nails for easier managability    Occasional soaks for 15-20 mins in luke warm water with 1 cup of listerine and 1 cup of apple cider vinegar are ok You may add several drops of oil of oregano or tea tree oil as well          Diabetes: Inspecting Your Feet  Diabetes increases your chances of developing foot problems. So inspect your feet every day. This helps you find small skin irritations before they become serious infections. If you have trouble seeing the bottoms of your feet, use a mirror or ask a family member or friend to help.     Pressure spots on the bottom of the foot are common areas where problems develop.   How to check your feet  Below are tips to help you look for foot problems. Try to check your feet at the same time each day, such as when you get out of bed in the morning:  · Check the top of each foot. The tops of toes, back of the heel, and outer edge of the foot can get a lot of rubbing from poor-fitting shoes.  · Check the bottom of each foot. Daily wear and tear often leads to problems at pressure spots.  · Check the toes and nails. Fungal infections often occur between toes. Toenail problems can also be a sign of fungal infections or lead to breaks in the skin.  · Check your shoes, too. Loose objects inside a shoe can injure the  foot. Use your hand to feel inside your shoes for things like mariano, loose stitching, or rough areas that could irritate your skin.  Warning signs  Look for any color changes in the foot. Redness with streaks can signal a severe infection, which needs immediate medical attention. Tell your doctor right away if you have any of these problems:  · Swelling, sometimes with color changes, may be a sign of poor blood flow or infection. Symptoms include tenderness and an increase in the size of your foot.  · Warm or hot areas on your feet may be signs of infection. A foot that is cold may not be getting enough blood.  · Sensations such as burning, tingling, or pins and needles can be signs of a problem. Also check for areas that may be numb.  · Hot spots are caused by friction or pressure. Look for hot spots in areas that get a lot of rubbing. Hot spots can turn into blisters, calluses, or sores.  · Cracks and sores are caused by dry or irritated skin. They are a sign that the skin is breaking down, which can lead to infection.  · Toenail problems to watch for include nails growing into the skin (ingrown toenail) and causing redness or pain. Thick, yellow, or discolored nails can signal a fungal infection.  · Drainage and odor can develop from untreated sores and ulcers. Call your doctor right away if you notice white or yellow drainage, bleeding, or unpleasant odor.   © 5467-2814 [employer]. [address] [place] [address], [place] [address]. All rights reserved. This information is not intended as a substitute for professional medical care. Always follow your healthcare professional's instructions.

## 2017-09-22 ENCOUNTER — TELEPHONE (OUTPATIENT)
Dept: ENDOSCOPY | Facility: HOSPITAL | Age: 82
End: 2017-09-22

## 2017-09-27 ENCOUNTER — TELEPHONE (OUTPATIENT)
Dept: ENDOSCOPY | Facility: HOSPITAL | Age: 82
End: 2017-09-27

## 2017-09-27 NOTE — TELEPHONE ENCOUNTER
Patient returned call to Endoscopy Scheduling to schedule EGD. Per Dr. Garcia Ehrensing patient can hold Plavix 5 days prior to procedure. EGD scheduled 10/10/17. Educated patient on procedure prep and holding time for blood thinner. Patient verbalized understanding. A set of prep instructions mailed to patient.

## 2017-09-29 ENCOUNTER — OFFICE VISIT (OUTPATIENT)
Dept: FAMILY MEDICINE | Facility: CLINIC | Age: 82
End: 2017-09-29
Payer: MEDICARE

## 2017-09-29 VITALS
HEART RATE: 97 BPM | OXYGEN SATURATION: 93 % | SYSTOLIC BLOOD PRESSURE: 128 MMHG | HEIGHT: 58 IN | BODY MASS INDEX: 27.39 KG/M2 | WEIGHT: 130.5 LBS | TEMPERATURE: 99 F | DIASTOLIC BLOOD PRESSURE: 80 MMHG

## 2017-09-29 DIAGNOSIS — N39.0 URINARY TRACT INFECTION WITHOUT HEMATURIA, SITE UNSPECIFIED: Primary | ICD-10-CM

## 2017-09-29 DIAGNOSIS — R60.9 EDEMA, UNSPECIFIED TYPE: ICD-10-CM

## 2017-09-29 DIAGNOSIS — R32 URINARY INCONTINENCE, UNSPECIFIED TYPE: ICD-10-CM

## 2017-09-29 PROCEDURE — 99214 OFFICE O/P EST MOD 30 MIN: CPT | Mod: 25,S$GLB,, | Performed by: INTERNAL MEDICINE

## 2017-09-29 PROCEDURE — G0008 ADMIN INFLUENZA VIRUS VAC: HCPCS | Mod: S$GLB,,, | Performed by: INTERNAL MEDICINE

## 2017-09-29 PROCEDURE — 1125F AMNT PAIN NOTED PAIN PRSNT: CPT | Mod: S$GLB,,, | Performed by: INTERNAL MEDICINE

## 2017-09-29 PROCEDURE — 3074F SYST BP LT 130 MM HG: CPT | Mod: S$GLB,,, | Performed by: INTERNAL MEDICINE

## 2017-09-29 PROCEDURE — 99499 UNLISTED E&M SERVICE: CPT | Mod: S$GLB,,, | Performed by: INTERNAL MEDICINE

## 2017-09-29 PROCEDURE — 99999 PR PBB SHADOW E&M-EST. PATIENT-LVL III: CPT | Mod: PBBFAC,,, | Performed by: INTERNAL MEDICINE

## 2017-09-29 PROCEDURE — 3079F DIAST BP 80-89 MM HG: CPT | Mod: S$GLB,,, | Performed by: INTERNAL MEDICINE

## 2017-09-29 PROCEDURE — 90662 IIV NO PRSV INCREASED AG IM: CPT | Mod: S$GLB,,, | Performed by: INTERNAL MEDICINE

## 2017-09-29 PROCEDURE — 1159F MED LIST DOCD IN RCRD: CPT | Mod: S$GLB,,, | Performed by: INTERNAL MEDICINE

## 2017-09-29 PROCEDURE — 3008F BODY MASS INDEX DOCD: CPT | Mod: S$GLB,,, | Performed by: INTERNAL MEDICINE

## 2017-09-29 RX ORDER — NITROFURANTOIN 25; 75 MG/1; MG/1
100 CAPSULE ORAL 2 TIMES DAILY
Qty: 10 CAPSULE | Refills: 0 | Status: SHIPPED | OUTPATIENT
Start: 2017-09-29 | End: 2017-10-04

## 2017-09-29 RX ORDER — TRIAMCINOLONE ACETONIDE 1 MG/G
CREAM TOPICAL
COMMUNITY
Start: 2017-09-06 | End: 2019-01-02

## 2017-09-29 NOTE — PROGRESS NOTES
Chief complaint:  possible bladder infection, and swelling    81-year-old white female with multiple medical problems last seen last mo 8/9/17 w UTI. She had been out of town  in [place].  Then  she developed dysuria, significant urinary frequency and incontinence, more yellow urine with increased odor.  Her typical for UTI.  History of complicated UTIs.  We  treated with Macrobid.  For 2 days now she's had yellow urine that is getting slightly cloudy and 2 days of dysuria with urine comes out.  She does have incontinence and wears a pad patient does get some urgency symptoms.  We discussed monitoring for recurrent UTIs and possibly refer to urology to evaluate for any mechanical problems and could be correctable.  Wraps or incontinence needs to be addressed.  GERD has a pre-existing dry mouth after her tongue surgery and so medications could be a follow-up.  We discussed watching for resolution of symptoms over the weekend and coming to the clinic should she worsen.  We will retreat with Macrobid discussing potential for resistance.  She is already using Monistat for some local irritation around the urethra and encouraged her to do so.  She also has had the return of some lower extremity edema bilaterally to the past 5 days.  She may well be back accidentally on the amlodipine/Norvasc and so I wrote both names on a piece of paper and advised her to reassess.  We had discontinued it last time for the edema and her blood pressure.  A be under good control.  We can always readjust other medications if indeed her blood pressure rises off of the amlodipine.  She will do leg elevation.  On exam today she does have 2+ edema of the lower extremities up to the knees but no cellulitis.  No pain in the soft tissues to suggest any DVT issue and so forth.  She is here with her .  Both were counseled about will be given her flu shots today.Total time over 25 minutes with over 50% counseling.         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 Shot monthly.  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  13) iron deficiency anemia. 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  Vertebral compression fracture treated with vertebral plasty   19.  Prevnar 13 2016  20.  Pelvic fracture December 2016  21.  GI bleed 2017, thickening of the distal esophagus on CT scan, EGD biopsy benign     Allergies: Prednisone, hydrocodone, Lotensin, oxybutynin     Social history: The patient is a nonsmoker who is retired. The patient is . She rarely drank alcohol.     Family history: There is a significant family history of cardiac, pulmonary, and endocrine disease. There is no family history of head and neck malignancy.        Vital signs as above, examined as above     general no distress, using a rolling walker,       Marina was seen today for urinary tract infection and joint swelling.    Diagnoses and all orders for this visit:    Urinary tract infection without hematuria, site unspecified,     Urinary incontinence, unspecified type    Edema, unspecified type    Other  orders  -     Influenza - High Dose (65+) (PF) (IM)  -     nitrofurantoin, macrocrystal-monohydrate, (MACROBID) 100 MG capsule; Take 1 capsule (100 mg total) by mouth 2 (two) times daily.

## 2017-10-05 DIAGNOSIS — E53.8 VITAMIN B12 DEFICIENCY: ICD-10-CM

## 2017-10-06 RX ORDER — CYANOCOBALAMIN 1000 UG/ML
INJECTION, SOLUTION INTRAMUSCULAR; SUBCUTANEOUS
Qty: 6 ML | Refills: 0 | Status: SHIPPED | OUTPATIENT
Start: 2017-10-06 | End: 2018-02-09 | Stop reason: SDUPTHER

## 2017-10-10 ENCOUNTER — HOSPITAL ENCOUNTER (OUTPATIENT)
Facility: HOSPITAL | Age: 82
Discharge: HOME | End: 2017-10-10
Attending: INTERNAL MEDICINE | Admitting: INTERNAL MEDICINE
Payer: MEDICARE

## 2017-10-10 ENCOUNTER — ANESTHESIA (OUTPATIENT)
Dept: ENDOSCOPY | Facility: HOSPITAL | Age: 82
End: 2017-10-10
Payer: MEDICARE

## 2017-10-10 ENCOUNTER — SURGERY (OUTPATIENT)
Age: 82
End: 2017-10-10

## 2017-10-10 ENCOUNTER — ANESTHESIA EVENT (OUTPATIENT)
Dept: ENDOSCOPY | Facility: HOSPITAL | Age: 82
End: 2017-10-10
Payer: MEDICARE

## 2017-10-10 VITALS
BODY MASS INDEX: 28.34 KG/M2 | DIASTOLIC BLOOD PRESSURE: 64 MMHG | OXYGEN SATURATION: 98 % | HEIGHT: 58 IN | WEIGHT: 135 LBS | HEART RATE: 68 BPM | TEMPERATURE: 98 F | SYSTOLIC BLOOD PRESSURE: 141 MMHG | RESPIRATION RATE: 16 BRPM

## 2017-10-10 VITALS — RESPIRATION RATE: 24 BRPM

## 2017-10-10 DIAGNOSIS — K31.5 DUODENAL STENOSIS: Primary | ICD-10-CM

## 2017-10-10 LAB — GLUCOSE SERPL-MCNC: 140 MG/DL (ref 70–110)

## 2017-10-10 PROCEDURE — 88342 IMHCHEM/IMCYTCHM 1ST ANTB: CPT | Mod: 26,,, | Performed by: PATHOLOGY

## 2017-10-10 PROCEDURE — 88313 SPECIAL STAINS GROUP 2: CPT | Mod: 26,,, | Performed by: PATHOLOGY

## 2017-10-10 PROCEDURE — 82962 GLUCOSE BLOOD TEST: CPT | Performed by: INTERNAL MEDICINE

## 2017-10-10 PROCEDURE — 88305 TISSUE EXAM BY PATHOLOGIST: CPT | Performed by: PATHOLOGY

## 2017-10-10 PROCEDURE — 63600175 PHARM REV CODE 636 W HCPCS: Performed by: NURSE ANESTHETIST, CERTIFIED REGISTERED

## 2017-10-10 PROCEDURE — D9220A PRA ANESTHESIA: Mod: ANES,,, | Performed by: ANESTHESIOLOGY

## 2017-10-10 PROCEDURE — 27201012 HC FORCEPS, HOT/COLD, DISP: Performed by: INTERNAL MEDICINE

## 2017-10-10 PROCEDURE — 25000003 PHARM REV CODE 250: Performed by: INTERNAL MEDICINE

## 2017-10-10 PROCEDURE — 43239 EGD BIOPSY SINGLE/MULTIPLE: CPT | Mod: GC,,, | Performed by: INTERNAL MEDICINE

## 2017-10-10 PROCEDURE — 88305 TISSUE EXAM BY PATHOLOGIST: CPT | Mod: 26,,, | Performed by: PATHOLOGY

## 2017-10-10 PROCEDURE — 37000008 HC ANESTHESIA 1ST 15 MINUTES: Performed by: INTERNAL MEDICINE

## 2017-10-10 PROCEDURE — 43239 EGD BIOPSY SINGLE/MULTIPLE: CPT | Performed by: INTERNAL MEDICINE

## 2017-10-10 PROCEDURE — D9220A PRA ANESTHESIA: Mod: CRNA,,, | Performed by: NURSE ANESTHETIST, CERTIFIED REGISTERED

## 2017-10-10 PROCEDURE — 25000003 PHARM REV CODE 250: Performed by: NURSE ANESTHETIST, CERTIFIED REGISTERED

## 2017-10-10 PROCEDURE — 37000009 HC ANESTHESIA EA ADD 15 MINS: Performed by: INTERNAL MEDICINE

## 2017-10-10 RX ORDER — PROPOFOL 10 MG/ML
VIAL (ML) INTRAVENOUS
Status: DISCONTINUED | OUTPATIENT
Start: 2017-10-10 | End: 2017-10-10

## 2017-10-10 RX ORDER — PROPOFOL 10 MG/ML
VIAL (ML) INTRAVENOUS CONTINUOUS PRN
Status: DISCONTINUED | OUTPATIENT
Start: 2017-10-10 | End: 2017-10-10

## 2017-10-10 RX ORDER — LIDOCAINE HCL/PF 100 MG/5ML
SYRINGE (ML) INTRAVENOUS
Status: DISCONTINUED | OUTPATIENT
Start: 2017-10-10 | End: 2017-10-10

## 2017-10-10 RX ORDER — GLYCOPYRROLATE 0.2 MG/ML
INJECTION INTRAMUSCULAR; INTRAVENOUS
Status: DISCONTINUED | OUTPATIENT
Start: 2017-10-10 | End: 2017-10-10

## 2017-10-10 RX ORDER — SODIUM CHLORIDE 9 MG/ML
INJECTION, SOLUTION INTRAVENOUS CONTINUOUS
Status: DISCONTINUED | OUTPATIENT
Start: 2017-10-10 | End: 2017-10-10 | Stop reason: HOSPADM

## 2017-10-10 RX ADMIN — SODIUM CHLORIDE: 0.9 INJECTION, SOLUTION INTRAVENOUS at 01:10

## 2017-10-10 RX ADMIN — PROPOFOL 50 MG: 10 INJECTION, EMULSION INTRAVENOUS at 02:10

## 2017-10-10 RX ADMIN — GLYCOPYRROLATE 0.2 MG: 0.2 INJECTION, SOLUTION INTRAMUSCULAR; INTRAVENOUS at 02:10

## 2017-10-10 RX ADMIN — PROPOFOL 100 MCG/KG/MIN: 10 INJECTION, EMULSION INTRAVENOUS at 02:10

## 2017-10-10 RX ADMIN — LIDOCAINE HYDROCHLORIDE 50 MG: 20 INJECTION, SOLUTION INTRAVENOUS at 02:10

## 2017-10-10 NOTE — INTERVAL H&P NOTE
The patient has been examined and the H&P has been reviewed:    There is no interval changes since last encounter.    EGD: Duodenal stenosis  Sedation: GA  ASA: Per anesthesia  Mallampati: Per anesthesia    Endoscopy risks, benefits and alternative options discussed and understood by patient/family.          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

## 2017-10-10 NOTE — ANESTHESIA POSTPROCEDURE EVALUATION
"Anesthesia Post Evaluation    Patient: Marina Trimble    Procedure(s) Performed: Procedure(s) (LRB):  ESOPHAGOGASTRODUODENOSCOPY (EGD) (N/A)    Final Anesthesia Type: general  Patient location during evaluation: PACU  Patient participation: Yes- Able to Participate  Level of consciousness: awake and alert and oriented  Post-procedure vital signs: reviewed and stable  Pain management: adequate  Airway patency: patent  PONV status at discharge: No PONV  Anesthetic complications: no      Cardiovascular status: hemodynamically stable  Respiratory status: unassisted  Hydration status: euvolemic  Follow-up not needed.        Visit Vitals  BP (!) 141/64   Pulse 68   Temp 36.8 °C (98.3 °F)   Resp 16   Ht 4' 10" (1.473 m)   Wt 61.2 kg (135 lb)   SpO2 98%   Breastfeeding? No   BMI 28.22 kg/m²       Pain/Fransico Score: Pain Assessment Performed: Yes (10/10/2017  1:50 PM)  Presence of Pain: denies (10/10/2017  2:55 PM)  Fransico Score: 10 (10/10/2017  2:55 PM)      "

## 2017-10-10 NOTE — H&P (VIEW-ONLY)
REASON FOR VISIT:  Indigestion.    HISTORY OF PRESENT ILLNESS:  Ms. Andrade had presented to the hospital on   07/05/2017 vomiting coffee-ground looking material and has been complaining of   indigestion.  Since the presentation, she underwent an upper endoscopy for   suspected upper GI bleed and vomiting and was found to have a sliding hiatal   hernia and luminal narrowing in the duodenum around the third portion suspicious   for malignancy; however, the biopsies were unremarkable and a CT scan that was   subsequently done at [facility] did not reveal any pancreatic   lesion.  She since discharge has not been having any more episodes of vomiting,   although she does have discomfort and sensation of nausea and indigestion.  It   is not clear if she is taking omeprazole or pantoprazole regularly.  She does   take aspirin and Plavix for coronary artery disease.  Today, we discussed about   relooking with an endoscope into the duodenum to reassess the stenosis and we   will probably repeat multiple biopsies to reevaluate.  No new complaints.    PAST MEDICAL, SURGICAL, SOCIAL AND FAMILY HISTORY:  Reviewed.    MEDICATIONS AND ALLERGIES:  Reviewed.    REVIEW OF SYSTEMS:  CONSTITUTIONAL:  No fever, no chills, decreased appetite and decrease in oral   intake.  EYES:  No visual changes.  ENT:  No odynophagia or hoarseness of voice.  CARDIOVASCULAR:  No angina or palpitation.  RESPIRATORY:  No shortness of breath or wheezing.  GASTROINTESTINAL:  See HPI.    PHYSICAL EXAMINATION:  VITAL SIGNS:  See EPIC.  GENERAL:  Awake, alert and oriented x3, in no acute distress.  NECK:  No carotid bruits.  No cervical adenopathy appreciated.  ABDOMEN:  Obese, soft, nontender, nondistended.  Examination limited because of   kyphoscoliosis.  Bowel sounds are normal.  No abdominal bruits heard.    IMPRESSION:  Recent episode of emesis with coffee-ground looking material with   concern for duodenal  stenosis.    RECOMMENDATIONS:  1.  Repeat endoscopy with biopsy of the duodenum.  2.  We may require a small bowel follow-through if the above investigations are   unrevealing.      ACT/HN  dd: 09/21/2017 14:08:24 (CDT)  td: 09/22/2017 03:02:16 (CDT)  Doc ID   #[id number]  Job ID #[id number]    CC:

## 2017-10-10 NOTE — TRANSFER OF CARE
"Anesthesia Transfer of Care Note    Patient: Marina Tirmble    Procedure(s) Performed: Procedure(s) (LRB):  ESOPHAGOGASTRODUODENOSCOPY (EGD) (N/A)    Patient location: PACU    Anesthesia Type: general    Transport from OR: Transported from OR on room air with adequate spontaneous ventilation    Post pain: adequate analgesia    Post assessment: no apparent anesthetic complications and tolerated procedure well    Post vital signs: stable    Level of consciousness: awake, alert and oriented    Nausea/Vomiting: no nausea/vomiting    Transfer of care protocol was followed      Last vitals:   Visit Vitals  BP (!) 133/59   Pulse 74   Temp 36.8 °C (98.3 °F)   Resp 16   Ht 4' 10" (1.473 m)   Wt 61.2 kg (135 lb)   SpO2 96%   Breastfeeding? No   BMI 28.22 kg/m²     "

## 2017-10-10 NOTE — PATIENT INSTRUCTIONS
Discharge Summary/Instructions after an Endoscopic Procedure  Patient Name: Marina Trimble  Patient MRN: [id number]  Patient YOB: 1935  Tuesday, October 10, 2017  Terence Mccloud MD  RESTRICTIONS:  During your procedure today, you received medications for sedation.  These   medications may affect your judgment, balance and coordination.  Therefore,   for 24 hours, you have the following restrictions:   - DO NOT drive a car, operate machinery, make legal/financial decisions,   sign important papers or drink alcohol.    ACTIVITY:  The following day: return to full activity including work, except no heavy   lifting, straining or running for 3 days if polyps were removed.  DIET:  Eat and drink normally unless instructed otherwise.  TREATMENT FOR COMMON SIDE EFFECTS:  - Mild abdominal pain, belching, bloating or excessive gas: rest, eat   lightly and use a heating pad.  - Sore Throat: treat with throat lozenges and/or gargle with warm salt   water.  SYMPTOMS TO WATCH FOR AND REPORT TO YOUR PHYSICIAN:  1. Abdominal pain or bloating, other than gas cramps.  2. Chest pain.  3. Back pain.  4. Chills or fever occurring within 24 hours after the procedure.  5. Rectal bleeding, which would show as bright red, maroon, or black stools.   (A tablespoon of blood from the rectum is not serious, especially if   hemorrhoids are present.)  6. Vomiting.  7. Weakness or dizziness.  8. Because air was used during the procedure, expelling large amounts of air   from your rectum or belching is normal.  9. If a bowel prep was taken, you may not have a bowel movement for 1-3   days.  This is normal.  GO DIRECTLY TO THE EMERGENCY ROOM IF YOU HAVE ANY OF THE FOLLOWING:   Difficulty breathing   Chills and/or fever over 101 F   Persistent vomiting and/or vomiting blood   Severe abdominal pain   Severe chest pain   Black, tarry stools   Bleeding- more than one tablespoon  Your doctor recommends these additional instructions:  If any  biopsies were taken, your doctors clinic will call you in 1 to 2   weeks with any results.  You have a contact number available for emergencies.  The signs and symptoms   of potential delayed complications were discussed with you.  You may return   to normal activities tomorrow.  Written discharge instructions were   provided to you.   You are being discharged to home.   Resume your previous diet.   Continue your present medications.   We are waiting for your pathology results.  For questions, problems or results please call your physician - Terence Mccloud MD at Work:  [phone].  [facility], EMERGENCY ROOM PHONE NUMBER: [phone]  IF A COMPLICATION OR EMERGENCY SITUATION ARISES AND YOU ARE UNABLE TO REACH   YOUR PHYSICIAN - GO DIRECTLY TO THE EMERGENCY ROOM.  Terence Mccloud MD  10/10/2017 2:46:46 PM  This report has been verified and signed electronically.

## 2017-10-10 NOTE — ANESTHESIA PREPROCEDURE EVALUATION
10/10/2017  Marina Trimble is a 82 y.o., female.  Pre-operative evaluation for ESOPHAGOGASTRODUODENOSCOPY (EGD) (N/A)    Chief Complaint: dudenal stenosis    PMH: DM, Hx of CVA, HTN, HLD, and small PFO   Vomits frequently-dysfunction of tongue s/p gloseectomy  Past Surgical History:   Procedure Laterality Date    APPENDECTOMY      CATARACT EXTRACTION W/  INTRAOCULAR LENS IMPLANT Left 10/16/14    OS ()    CHOLECYSTECTOMY      COLECTOMY      COLONOSCOPY      EYE SURGERY Bilateral     cataracts    eyelid surgery      HYSTERECTOMY      PARTIAL GLOSSECTOMY  2010    SELECTIVE NECK DISSECTION      tonsilectomy      TONSILLECTOMY           Vital Signs Range (Last 24H):  Temp:  [36.7 °C (98.1 °F)]   Pulse:  [85]   Resp:  [16]   BP: (156)/(67)   SpO2:  [96 %]       CBC:   No results for input(s): WBC, RBC, HGB, HCT, PLT, MCV, MCH, MCHC in the last 720 hours.    CMP: No results for input(s): NA, K, CL, CO2, BUN, CREATININE, GLU, MG, PHOS, CALCIUM, ALBUMIN, PROT, ALKPHOS, ALT, AST, BILITOT in the last 720 hours.    INR:  No results for input(s): INR, PROTIME, APTT in the last 720 hours.    Invalid input(s): PT      Diagnostic Studies:      EKD Echo:    1 - Trivial aortic regurgitation.     2 - Normal left ventricular function.     3 - Atrial septal aneurysm .     4 - Shunt across atrial septum consistent with very small PFO.     5 - Left atrial appendage is single lobed with no visualized thrombus.     6 - Biatrial enlargement.     7 - Grade 3 atheroma disease of aorta.     Anesthesia Evaluation         Review of Systems      Physical Exam  General:  Obesity    Airway/Jaw/Neck:  Airway Findings: Mouth Opening: Normal Tongue: Normal  General Airway Assessment: Possible difficult mask airway, Possible difficult intubation  Mallampati: III  Improves to III with phonation.  TM  Distance: Normal, at least 6 cm  Jaw/Neck Findings:     Neck ROM: Extension Decreased, Mod.      Dental:  Dental Findings: In tact   Chest/Lungs:  Chest/Lungs Findings: Clear to auscultation, Normal Respiratory Rate     Heart/Vascular:  Heart Findings: Rate: Normal  Rhythm: Regular Rhythm  Sounds: Normal        Mental Status:  Mental Status Findings:  Cooperative, Alert and Oriented         Anesthesia Plan  Type of Anesthesia, risks & benefits discussed:  Anesthesia Type:  general  Patient's Preference:   Intra-op Monitoring Plan:   Intra-op Monitoring Plan Comments:   Post Op Pain Control Plan:   Post Op Pain Control Plan Comments:   Induction:   IV  Beta Blocker:  Patient is not currently on a Beta-Blocker (No further documentation required).       Informed Consent: Patient understands risks and agrees with Anesthesia plan.  Questions answered. Anesthesia consent signed with patient.  ASA Score: 3     Day of Surgery Review of History & Physical:    H&P update referred to the surgeon.         Ready For Surgery From Anesthesia Perspective.

## 2017-10-17 ENCOUNTER — TELEPHONE (OUTPATIENT)
Dept: ENDOSCOPY | Facility: HOSPITAL | Age: 82
End: 2017-10-17

## 2017-10-23 ENCOUNTER — TELEPHONE (OUTPATIENT)
Dept: GASTROENTEROLOGY | Facility: CLINIC | Age: 82
End: 2017-10-23

## 2017-10-23 NOTE — TELEPHONE ENCOUNTER
----- Message from Terence Mccloud MD sent at 10/23/2017 12:55 PM CDT -----  Biopsies do not reveal any H.pylori. The small bowel biopsies are fine as well.

## 2017-10-27 RX ORDER — PEN NEEDLE, DIABETIC 32 GX 1/4"
NEEDLE, DISPOSABLE MISCELLANEOUS
Qty: 500 EACH | Refills: 12 | Status: SHIPPED | OUTPATIENT
Start: 2017-10-27 | End: 2018-11-05 | Stop reason: SDUPTHER

## 2017-11-09 ENCOUNTER — TELEPHONE (OUTPATIENT)
Dept: FAMILY MEDICINE | Facility: CLINIC | Age: 82
End: 2017-11-09

## 2017-11-09 ENCOUNTER — OFFICE VISIT (OUTPATIENT)
Dept: FAMILY MEDICINE | Facility: CLINIC | Age: 82
End: 2017-11-09
Payer: MEDICARE

## 2017-11-09 ENCOUNTER — HOSPITAL ENCOUNTER (OUTPATIENT)
Dept: RADIOLOGY | Facility: HOSPITAL | Age: 82
Discharge: HOME | End: 2017-11-09
Attending: INTERNAL MEDICINE
Payer: MEDICARE

## 2017-11-09 VITALS
BODY MASS INDEX: 26.48 KG/M2 | HEIGHT: 58 IN | DIASTOLIC BLOOD PRESSURE: 68 MMHG | TEMPERATURE: 99 F | WEIGHT: 126.13 LBS | OXYGEN SATURATION: 96 % | HEART RATE: 110 BPM | SYSTOLIC BLOOD PRESSURE: 122 MMHG

## 2017-11-09 DIAGNOSIS — N39.0 URINARY TRACT INFECTION WITHOUT HEMATURIA, SITE UNSPECIFIED: ICD-10-CM

## 2017-11-09 DIAGNOSIS — D64.9 ANEMIA, UNSPECIFIED TYPE: ICD-10-CM

## 2017-11-09 DIAGNOSIS — E78.5 HYPERLIPIDEMIA, UNSPECIFIED HYPERLIPIDEMIA TYPE: ICD-10-CM

## 2017-11-09 DIAGNOSIS — K31.5 DUODENAL STENOSIS: ICD-10-CM

## 2017-11-09 DIAGNOSIS — M54.50 ACUTE MIDLINE LOW BACK PAIN WITHOUT SCIATICA: ICD-10-CM

## 2017-11-09 DIAGNOSIS — I10 ESSENTIAL HYPERTENSION: ICD-10-CM

## 2017-11-09 DIAGNOSIS — Z86.73 HISTORY OF CVA (CEREBROVASCULAR ACCIDENT): ICD-10-CM

## 2017-11-09 DIAGNOSIS — E11.42 DIABETIC POLYNEUROPATHY ASSOCIATED WITH TYPE 2 DIABETES MELLITUS: ICD-10-CM

## 2017-11-09 DIAGNOSIS — Z00.00 ROUTINE MEDICAL EXAM: Primary | ICD-10-CM

## 2017-11-09 PROCEDURE — 99214 OFFICE O/P EST MOD 30 MIN: CPT | Mod: 25,S$GLB,, | Performed by: INTERNAL MEDICINE

## 2017-11-09 PROCEDURE — G0009 ADMIN PNEUMOCOCCAL VACCINE: HCPCS | Mod: S$GLB,,, | Performed by: INTERNAL MEDICINE

## 2017-11-09 PROCEDURE — 99999 PR PBB SHADOW E&M-EST. PATIENT-LVL III: CPT | Mod: PBBFAC,,, | Performed by: INTERNAL MEDICINE

## 2017-11-09 PROCEDURE — 90732 PPSV23 VACC 2 YRS+ SUBQ/IM: CPT | Mod: S$GLB,,, | Performed by: INTERNAL MEDICINE

## 2017-11-09 PROCEDURE — 72100 X-RAY EXAM L-S SPINE 2/3 VWS: CPT | Mod: TC,PO

## 2017-11-09 PROCEDURE — 72100 X-RAY EXAM L-S SPINE 2/3 VWS: CPT | Mod: 26,,, | Performed by: RADIOLOGY

## 2017-11-09 PROCEDURE — 99397 PER PM REEVAL EST PAT 65+ YR: CPT | Mod: S$GLB,,, | Performed by: INTERNAL MEDICINE

## 2017-11-09 PROCEDURE — 99499 UNLISTED E&M SERVICE: CPT | Mod: S$GLB,,, | Performed by: INTERNAL MEDICINE

## 2017-11-09 RX ORDER — NITROFURANTOIN 25; 75 MG/1; MG/1
100 CAPSULE ORAL 2 TIMES DAILY
Qty: 10 CAPSULE | Refills: 0 | Status: SHIPPED | OUTPATIENT
Start: 2017-11-09 | End: 2017-11-14

## 2017-11-09 RX ORDER — NITROFURANTOIN 25; 75 MG/1; MG/1
100 CAPSULE ORAL 2 TIMES DAILY
Qty: 10 CAPSULE | Refills: 0 | Status: SHIPPED | OUTPATIENT
Start: 2017-11-09 | End: 2017-11-09 | Stop reason: SDUPTHER

## 2017-11-09 NOTE — PROGRESS NOTES
Chief complaint:  physical    82-year-old white female with multiple medical problems .  She is active is much as she can be.  She is due for blood work.  She no longer needs any breast or colon cancer screening based upon her age         ROS:   CONST: weight stable. EYES: no vision change. ENT: no sore throat. CV: no chest pain w/ exertion. RESP: no shortness of breath. GI: no nausea, vomiting, diarrhea. No dysphagia. : Some recent urinary frequency and occasional dysuria. MUSCULOSKELETAL: no new myalgias or arthralgias other than a week and a half of increase lower lumbar pain. SKIN: no new changes. NEURO: no focal deficits. PSYCH: no new issues. ENDOCRINE: no polyuria. HEME: no lymph nodes. ALLERGY: no general pruritis.      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 Shot monthly.  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  13) iron deficiency anemia. 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  Vertebral compression fracture treated with vertebral plasty   19.  Prevnar 13 2016  20.  Pelvic fracture December 2016  21.  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  Eventual total Hyst    Allergies: Prednisone, hydrocodone, Lotensin, oxybutynin     Social history: The patient is a nonsmoker who is retired. The patient is . She rarely drank alcohol.     Family history: There is a significant family history of cardiac, pulmonary, and endocrine disease. There is no family history of head and neck malignancy.        Vital signs as above, Gen: no distress  EYES: conjunctiva clear, non-icteric, PERRL  ENT: nose clear, nasal mucosa normal, oropharynx clear and moist, teeth good  NECK:supple, thyroid non-palpable  RESP: effort is good, lungs clear  CV: heart RRR w/o murmur, gallops or rubs; no carotid bruits, no edema  GI: abdomen soft, non-distended, non-tender, no hepatosplenomegaly  MS: Kyphotic with walker, no clubbing or cyanosis of the digits, obvious scoliosis of the lumbar spine.  This no inflammatory changes to the skin although the spinous processes are little bit lumpy in the mid lower lumbar area there is some redness but I think it's from how she was sitting in the back of her spine pressing against the chair.  I can percuss the area fairly well without any significant pain  SKIN: no rashes, warm to touch    Marina was seen today for urinary tract infection and diabetes.    Diagnoses and all orders for this visit:    Routine medical exam, update blood work, no further colonoscopies or mammograms indicated                                      Additional evaluation and management issues: X    Additionally, patient has numerous other medical issues to address separate from her physical.  She has diabetes is generally chronically uncontrolled.  She was taking Levemir 25 units and it would be too much and she would have morning sugars in the 60s.  She would then hold the medication for a week and over week and gradually go back up into the 200 range.  We discussed that likely she needs a lower dose on a daily basis such as tender 15 units and I gave  him written instructions    She has had some increase in her lumbar pain for a week and a half.  She does not feel lump over the lumbar spine that was not there before she believes.  She does have a history of compression fractures but no recent falls.  She has had vertebral plasty in the past we will obtain x-ray today and discussed possibility of MRI or referral directly to pain management if necessary    Also with urinary frequency every 10 minutes or so and some occasional dysuria.  It's been over a week.  She did drop off a urine downstairs.  We discussed empiric treatment with Macrobid based upon symptoms.    She has hypertension which appears to be indicated control.  She has anemia which we need to reassess for deficiency.  She has duodenal stenosis and we reviewed the EGD and she has no trouble swallowing or vomiting.  She has history of stroke and hyperlipidemia.  She is not fasting but we will reassess LDL at least.  She may well had a Pneumovax in the past but we don't have record and so we'll provide him with a Pneumovax today.  All the separate issues reviewed and patient counseled and her evaluation and management will be based upon time counseling Total time over 25 minutes with over 50% counseling.        Assessment and plan:        Diabetes mellitus with neurological manifestations, uncontrolled, reassess, insulin adjustment as above  -     Hemoglobin A1c; Future  -     Microalbumin/creatinine urine ratio; Future  -     Urinalysis; Future    Diabetic polyneuropathy associated with type 2 diabetes mellitus    Essential hypertension, chronic and stable  -     Comprehensive metabolic panel; Future  -     TSH; Future    Anemia, unspecified type  -     Iron and TIBC; Future  -     Ferritin; Future  -     CBC auto differential; Future    Duodenal stenosis    History of CVA (cerebrovascular accident)    Hyperlipidemia, unspecified hyperlipidemia type  -     LDL Cholesterol, Direct Measurement;  Future    Urinary tract infection without hematuria, site unspecified    Acute midline low back pain without sciatica, rule out compression fracture  -     X-Ray Lumbar Spine AP And Lateral; Future    Other orders  -     Pneumococcal Polysaccharide Vaccine (23 Valent) (SQ/IM)  -     Discontinue: nitrofurantoin, macrocrystal-monohydrate, (MACROBID) 100 MG capsule; Take 1 capsule (100 mg total) by mouth 2 (two) times daily.  -     nitrofurantoin, macrocrystal-monohydrate, (MACROBID) 100 MG capsule; Take 1 capsule (100 mg total) by mouth 2 (two) times daily.

## 2017-11-09 NOTE — TELEPHONE ENCOUNTER
Patient had low back pain and was worried about a compression fracture since she had that before    Please call and advise that the most recent back x-ray does not show any change from previous, no new fractures

## 2017-11-10 RX ORDER — ESOMEPRAZOLE MAGNESIUM 40 MG/1
CAPSULE, DELAYED RELEASE ORAL
Qty: 90 CAPSULE | Refills: 1 | Status: CANCELLED | OUTPATIENT
Start: 2017-11-10 | End: 2018-11-10

## 2017-11-10 RX ORDER — ESOMEPRAZOLE MAGNESIUM 40 MG/1
CAPSULE, DELAYED RELEASE ORAL
Qty: 90 CAPSULE | Refills: 1 | COMMUNITY
Start: 2017-11-10 | End: 2018-01-26

## 2017-11-10 NOTE — TELEPHONE ENCOUNTER
Pt informed about back x-ray.  Pt needs refill of Nexium, she was given med in the hospital and has now ran out. Order in please sign. lov 11/9/17

## 2017-11-13 ENCOUNTER — TELEPHONE (OUTPATIENT)
Dept: FAMILY MEDICINE | Facility: CLINIC | Age: 82
End: 2017-11-13

## 2017-11-14 NOTE — TELEPHONE ENCOUNTER
Please call with results    The diabetes is much better with the A1c going down to 6.9.    The blood counts are back to normal and the iron levels are good so, if on iron, okay to discontinue    Kidney, liver, electrolytes and thyroid all normal

## 2017-12-17 RX ORDER — OMEPRAZOLE 40 MG/1
CAPSULE, DELAYED RELEASE ORAL
Qty: 90 CAPSULE | Refills: 90 | Status: SHIPPED | OUTPATIENT
Start: 2017-12-17 | End: 2017-12-20 | Stop reason: SDUPTHER

## 2017-12-20 RX ORDER — OMEPRAZOLE 40 MG/1
40 CAPSULE, DELAYED RELEASE ORAL EVERY MORNING
Qty: 90 CAPSULE | Refills: 90 | Status: SHIPPED | OUTPATIENT
Start: 2017-12-20 | End: 2018-02-26 | Stop reason: SDUPTHER

## 2017-12-20 NOTE — TELEPHONE ENCOUNTER
----- Message from Nanette Santacruz sent at 12/20/2017 11:18 AM CST -----  Contact: Self   Patient need a refill for her medication. Please call patient at [phone].      omeprazole (PRILOSEC) 40 MG capsule        Deloris Collins

## 2017-12-20 NOTE — TELEPHONE ENCOUNTER
Patient is requesting 30 day supply Omeprazole sent to [facility]'s. She states medication was sent to [facility] and that will be a problem because she is almost out.

## 2017-12-21 ENCOUNTER — OFFICE VISIT (OUTPATIENT)
Dept: PODIATRY | Facility: CLINIC | Age: 82
End: 2017-12-21
Payer: MEDICARE

## 2017-12-21 VITALS
BODY MASS INDEX: 26.45 KG/M2 | WEIGHT: 126 LBS | DIASTOLIC BLOOD PRESSURE: 66 MMHG | HEIGHT: 58 IN | SYSTOLIC BLOOD PRESSURE: 132 MMHG

## 2017-12-21 DIAGNOSIS — B35.1 NAIL DERMATOPHYTOSIS: ICD-10-CM

## 2017-12-21 DIAGNOSIS — L90.9 FAT PAD ATROPHY OF FOOT: ICD-10-CM

## 2017-12-21 DIAGNOSIS — E11.49 TYPE II DIABETES MELLITUS WITH NEUROLOGICAL MANIFESTATIONS: Primary | ICD-10-CM

## 2017-12-21 DIAGNOSIS — D51.0 PERNICIOUS ANEMIA: ICD-10-CM

## 2017-12-21 PROCEDURE — 11721 DEBRIDE NAIL 6 OR MORE: CPT | Mod: Q9,S$GLB,, | Performed by: PODIATRIST

## 2017-12-21 PROCEDURE — 99499 UNLISTED E&M SERVICE: CPT | Mod: S$GLB,,, | Performed by: PODIATRIST

## 2017-12-21 PROCEDURE — 99999 PR PBB SHADOW E&M-EST. PATIENT-LVL III: CPT | Mod: PBBFAC,,, | Performed by: PODIATRIST

## 2017-12-21 NOTE — PATIENT INSTRUCTIONS
Recommend lotions: eucerin, aquaphor, A&D ointment, gold bond for diabetics, sween; urea 40 with aloe (found on amazon.com)    Shoe recommendations: (try 6pm.com, zappos.com , nordstromrack.com, or shoes.com for discounted prices) you can visit DSW shoes in [place] as well    Asics (GT 2000 or gel foundations), new balance, saucony (stabil c3),  Barnes (GTS or Beast or transcend), vionic, propet (tennis shoe)    sofft brand, clarks, crocs, aerosoles, naturalizers, SAS, ecco, ginette, sara geiger, rockports (dress shoes)    Vionic, burkenstocks, fitflops, propet (sandals)    Nike comfort thong sandals, crocs, propet (house shoes)    Nail Home remedy:  Vicks Vapor rub to nails for easier managability    Occasional soaks for 15-20 mins in luke warm water with 1 cup of listerine and 1 cup of apple cider vinegar are ok You may add several drops of oil of oregano or tea tree oil as well      Diabetes: Inspecting Your Feet  Diabetes increases your chances of developing foot problems. So inspect your feet every day. This helps you find small skin irritations before they become serious infections. If you have trouble seeing the bottoms of your feet, use a mirror or ask a family member or friend to help.     Pressure spots on the bottom of the foot are common areas where problems develop.   How to check your feet  Below are tips to help you look for foot problems. Try to check your feet at the same time each day, such as when you get out of bed in the morning:  · Check the top of each foot. The tops of toes, back of the heel, and outer edge of the foot can get a lot of rubbing from poor-fitting shoes.  · Check the bottom of each foot. Daily wear and tear often leads to problems at pressure spots.  · Check the toes and nails. Fungal infections often occur between toes. Toenail problems can also be a sign of fungal infections or lead to breaks in the skin.  · Check your shoes, too. Loose objects inside a shoe can injure the foot.  Use your hand to feel inside your shoes for things like mariano, loose stitching, or rough areas that could irritate your skin.  Warning signs  Look for any color changes in the foot. Redness with streaks can signal a severe infection, which needs immediate medical attention. Tell your doctor right away if you have any of these problems:  · Swelling, sometimes with color changes, may be a sign of poor blood flow or infection. Symptoms include tenderness and an increase in the size of your foot.  · Warm or hot areas on your feet may be signs of infection. A foot that is cold may not be getting enough blood.  · Sensations such as burning, tingling, or pins and needles can be signs of a problem. Also check for areas that may be numb.  · Hot spots are caused by friction or pressure. Look for hot spots in areas that get a lot of rubbing. Hot spots can turn into blisters, calluses, or sores.  · Cracks and sores are caused by dry or irritated skin. They are a sign that the skin is breaking down, which can lead to infection.  · Toenail problems to watch for include nails growing into the skin (ingrown toenail) and causing redness or pain. Thick, yellow, or discolored nails can signal a fungal infection.  · Drainage and odor can develop from untreated sores and ulcers. Call your doctor right away if you notice white or yellow drainage, bleeding, or unpleasant odor.   © 1126-6021 [employer]. [address] [place] [address]. All rights reserved. This information is not intended as a substitute for professional medical care. Always follow your healthcare professional's instructions.        Step-by-Step:  Inspecting Your Feet (Diabetes)    Date Last Reviewed: 10/1/2016  © 2659-0865 [employer]. [address] [place] [address]. All rights reserved. This information is not intended as a substitute for professional medical care. Always follow your healthcare professional's  instructions.

## 2017-12-21 NOTE — PROGRESS NOTES
Subjective:      Patient ID: Marina Trimble is a 82 y.o. female.    Chief Complaint: Diabetes Mellitus (Pcp Dr. Jimenez 11/09/2017); Diabetic Foot Exam; and Nail Care    Marina is a 82 y.o. female who presents to the clinic for evaluation and treatment of high risk feet. 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 The patient's chief complaint is long, thick toenails. This patient has documented high risk feet requiring routine maintenance secondary to diabetes mellitis and those secondary complications of diabetes, as mentioned. She has the one type of anemia; pernious 281.0 that still qualifies for nail care.    PCP: Jose Jimenez MD    Date Last Seen by PCP:   Chief Complaint   Patient presents with    Diabetes Mellitus     Pcp Dr. Jimenez 11/09/2017    Diabetic Foot Exam    Nail Care       Current shoe gear:  SAS shoes    Hemoglobin A1C   Date Value Ref Range Status   11/09/2017 6.9 (H) 4.0 - 5.6 % Final     Comment:     According to ADA guidelines, hemoglobin A1c <7.0% represents  optimal control in non-pregnant diabetic patients. Different  metrics  may apply to specific patient populations.   Standards of Medical Care in Diabetes-2016.  For the purpose of screening for the presence of diabetes:  <5.7%     Consistent with the absence of diabetes  5.7-6.4%  Consistent with increasing risk for diabetes   (prediabetes)  >or=6.5%  Consistent with diabetes  Currently, no consensus exists for use of hemoglobin A1c  for diagnosis of diabetes for children.  This Hemoglobin A1c assay has significant interference with fetal   hemoglobin   (HbF). The results are invalid for patients with abnormal amounts of   HbF,   including those with known Hereditary Persistence   of Fetal Hemoglobin. Heterozygous hemoglobin variants (HbAS, HbAC,   HbAD, HbAE, HbA2) do not significantly interfere with this assay;   however, presence of multiple variants in a sample may impact the %   interference.     07/09/2017 8.1 (H) 4.0 - 5.6 % Final     Comment:     According to ADA guidelines, hemoglobin A1c <7.0% represents  optimal control in non-pregnant diabetic patients. Different  metrics may apply to specific patient populations.   Standards of Medical Care in Diabetes-2016.  For the purpose of screening for the presence of diabetes:  <5.7%     Consistent with the absence of diabetes  5.7-6.4%  Consistent with increasing risk for diabetes   (prediabetes)  >or=6.5%  Consistent with diabetes  Currently, no consensus exists for use of hemoglobin A1c  for diagnosis of diabetes for children.  This Hemoglobin A1c assay has significant interference with fetal   hemoglobin   (HbF). The results are invalid for patients with abnormal amounts of   HbF,   including those with known Hereditary Persistence   of Fetal Hemoglobin. Heterozygous hemoglobin variants (HbAS, HbAC,   HbAD, HbAE, HbA2) do not significantly interfere with this assay;   however, presence of multiple variants in a sample may impact the %   interference.     12/23/2016 9.2 (H) 4.5 - 6.2 % Final     Comment:     According to ADA  "guidelines, hemoglobin A1C <7.0% represents  optimal control in non-pregnant diabetic patients.  Different  metrics may apply to specific populations.   Standards of Medical Care in Diabetes - 2016.  For the purpose of screening for the presence of diabetes:  <5.7%     Consistent with the absence of diabetes  5.7-6.4%  Consistent with increasing risk for diabetes   (prediabetes)  >or=6.5%  Consistent with diabetes  Currently no consensus exists for use of hemoglobin A1C  for diagnosis of diabetes for children.       Current Outpatient Prescriptions on File Prior to Visit   Medication Sig Dispense Refill    ACCU-CHEK FASTCLIX Misc USE TO CHECK BLOOD GLUCOSE THREE TIMES DAILY 1000 each 11    ACCU-CHEK SMARTVIEW TEST STRIP Strp USE TO CHECK BLOOD GLUCOSE 3 TIMES DAILY 300 strip 11    acetaminophen (TYLENOL) 500 MG tablet Take 1 tablet (500 mg total) by mouth every 4 (four) hours as needed for Pain.  0    acetaminophen-codeine 300-60mg (TYLENOL #4) 300-60 mg Tab Take 1 tablet by mouth every 6 (six) hours as needed (back pain). 90 tablet 3    amitriptyline (ELAVIL) 10 MG tablet Take 10 mg by mouth once daily.       aspirin (ECOTRIN) 81 MG EC tablet Take 81 mg by mouth once daily.      atorvastatin (LIPITOR) 40 MG tablet TAKE 1 TABLET ONE TIME DAILY 90 tablet 3    BD ULTRA-FINE BEVERLY PEN NEEDLES 32 gauge x 5/32" Ndle USE TWICE DAILY 500 each 12    blood-glucose meter Misc Use ad directed (Accu Check Beverly smartview meter) 1 each 4    clopidogrel (PLAVIX) 75 mg tablet TAKE 1 TABLET ONE TIME DAILY 90 tablet 3    cyanocobalamin 1,000 mcg/mL injection INJECT 1 ML UNDER THE SKIN EVERY 2 WEEKS 6 mL 0    esomeprazole (NEXIUM) 40 MG capsule TAKE 1 CAPSULE EVERY DAY IN THE MORNING 90 capsule 1    insulin aspart (NOVOLOG FLEXPEN) 100 unit/mL InPn pen Inject 4/6/6 units into skin with correction scale 150-200 +1, 201-250 +2, 251-300 +3, 301-350 +4, >350 +5. 6 mL 4    LEVEMIR FLEXTOUCH 100 unit/mL (3 mL) InPn pen INJECT " "35 UNITS INTO THE SKIN EVERY EVENING. 30 mL 4    losartan (COZAAR) 100 MG Tab TAKE 1 TABLET ONE TIME DAILY 90 tablet 90    multivitamin (CHEWABLE MULTI VITAMIN) Chew Take 2 tablets by mouth once daily.       naproxen (NAPROSYN) 500 MG tablet Take 1 tablet (500 mg total) by mouth 2 (two) times daily with meals. 11 tablet 0    NOVOFINE 32 32 gauge x 1/4" Ndle USE WITH LEVEMIR PEN EVERY EVENING 500 each 12    omeprazole (PRILOSEC) 40 MG capsule Take 1 capsule (40 mg total) by mouth every morning. 90 capsule 90    pantoprazole (PROTONIX) 40 MG tablet Take 1 tablet (40 mg total) by mouth once daily. 30 tablet 3    prochlorperazine (COMPAZINE) 10 MG tablet Take 1 tablet (10 mg total) by mouth every 6 (six) hours as needed. 30 tablet 3    PROCTOZONE-HC 2.5 % rectal cream Place rectally 2 (two) times daily. 90 g 12    triamcinolone acetonide 0.1% (KENALOG) 0.1 % cream       TRUEPLUS LANCETS 28 gauge Misc USE  TO CHECK BLOOD SUGAR THREE TIMES DAILY 300 each 0    UNABLE TO FIND Places uses Well[place] daily      vitamin D 1000 units Tab Take 1 tablet (1,000 Units total) by mouth once daily.      VOLTAREN 1 % Gel       cycloSPORINE (RESTASIS) 0.05 % ophthalmic emulsion Place 0.4 mLs (1 drop total) into both eyes 2 (two) times daily. 180 vial 12     No current facility-administered medications on file prior to visit.      Review of patient's allergies indicates:   Allergen Reactions    Baclofen Nausea And Vomiting and Other (See Comments)     Dizziness    Benazepril      Other reaction(s): Rash,Swelling    Carisoprodol      Other reaction(s): Dysphoria    Desloratadine      Other reaction(s): Nausea  Other reaction(s): Nausea    Hydrocodone      Other reaction(s): disorientation    Methocarbamol      Other reaction(s): Nausea    Omeprazole Nausea And Vomiting    Oxybutynin      Other reaction(s): Hallucinations    Oxycodone Nausea And Vomiting    Tramadol      Other reaction(s): dizziness    " Benazepril-hydrochlorothiazide Rash     Other reaction(s): Swelling    Metformin Diarrhea    Prednisone Rash     Past Surgical History:   Procedure Laterality Date    APPENDECTOMY      CATARACT EXTRACTION W/  INTRAOCULAR LENS IMPLANT Left 10/16/14    OS ()    CHOLECYSTECTOMY      COLECTOMY      COLONOSCOPY      EYE SURGERY Bilateral     cataracts    eyelid surgery      HYSTERECTOMY      PARTIAL GLOSSECTOMY  2010    SELECTIVE NECK DISSECTION  2010    tonsilectomy      TONSILLECTOMY       Family History   Problem Relation Age of Onset    Cataracts Mother     Macular degeneration Mother     Blindness Mother     No Known Problems Father     No Known Problems Son     No Known Problems Son     Coronary artery disease      COPD      No Known Problems Sister     No Known Problems Brother     No Known Problems Maternal Aunt     No Known Problems Maternal Uncle     No Known Problems Paternal Aunt     No Known Problems Paternal Uncle     No Known Problems Maternal Grandmother     No Known Problems Maternal Grandfather     No Known Problems Paternal Grandmother     No Known Problems Paternal Grandfather     Skin cancer Neg Hx     Melanoma Neg Hx     Amblyopia Neg Hx     Cancer Neg Hx     Diabetes Neg Hx     Glaucoma Neg Hx     Hypertension Neg Hx     Retinal detachment Neg Hx     Strabismus Neg Hx     Stroke Neg Hx     Thyroid disease Neg Hx     Colon cancer Neg Hx     Esophageal cancer Neg Hx      Social History     Social History    Marital status:      Spouse name: N/A    Number of children: N/A    Years of education: N/A     Occupational History    Not on file.     Social History Main Topics    Smoking status: Never Smoker    Smokeless tobacco: Never Used    Alcohol use No    Drug use: No    Sexual activity: No     Other Topics Concern    Are You Pregnant Or Think You May Be? No    Breast-Feeding No     Social History Narrative    She is . [place]  "[place]     Review of Systems   Constitution: Negative for chills, fever and weakness.   Cardiovascular: Positive for leg swelling. Negative for claudication.   Respiratory: Positive for shortness of breath. Negative for cough.    Skin: Positive for dry skin and nail changes. Negative for itching and rash.   Musculoskeletal: Positive for arthritis, back pain, falls (december 2016, broke her hip), gout and joint pain. Negative for joint swelling and muscle weakness.   Gastrointestinal: Negative for diarrhea, nausea and vomiting.   Neurological: Positive for paresthesias. Negative for numbness and tremors.   Psychiatric/Behavioral: Negative for altered mental status and hallucinations.         Objective:       Vitals:    12/21/17 0927   BP: 132/66   Weight: 57.2 kg (126 lb)   Height: 4' 10" (1.473 m)   PainSc: 0-No pain       Physical Exam   Constitutional:   General: Pt. is well-developed, well-nourished, appears stated age, in no acute distress, alert and oriented x 3. No evidence of depression, anxiety, or agitation. Calm, cooperative, and communicative. Appropriate interactions and affect.       Cardiovascular:   Pulses:       Dorsalis pedis pulses are 1+ on the right side, and 1+ on the left side.        Posterior tibial pulses are 1+ on the right side, and 1+ on the left side.   Dorsalis pedis and posterior tibial pulses are diminished Bilaterally. Toes are cool to touch. Feet are warm proximally.There is decreased digital hair. Skin is atrophic   Musculoskeletal:        Right ankle: She exhibits swelling. She exhibits normal range of motion. No tenderness. Achilles tendon exhibits no pain and no defect.        Left ankle: She exhibits swelling. She exhibits normal range of motion. No tenderness. Achilles tendon exhibits no pain and no defect.        Right foot: There is normal range of motion and no tenderness.        Left foot: There is normal range of motion and no tenderness.   Decreased " stride, station of gait.  apropulsive toe off.  Increased angle and base of gait.    Patient has hammertoes of digits 2-5 bilateral partially reducible without symptom today.    Visible and palpable bunion without pain at dorsomedial 1st metatarsal head right and left.  Hallux abducted right and left partially reducible, tracks laterally without being track bound.  No ecchymosis, erythema, edema, or cardinal signs infection or signs of trauma same foot.    Fat pad atrophy to heels and met heads bilateral       Neurological: No sensory deficit.   Bennington-Rodolfo 5.07 monofilament is intact bilateral feet. Sharp/dull sensation is also intact Bilateral feet.       Skin: Skin is warm, dry and intact. No abrasion, no ecchymosis, no lesion and no rash noted. She is not diaphoretic. No cyanosis or erythema. No pallor. Nails show no clubbing.   Skin is atrophic, mild plantar rubor.  No pallor.      No pedal hair noted    Toenails 1-5 bilaterally are elongated by 2-3 mm, thickened by 2-3 mm, discolored/yellowed, dystrophic, brittle with subungual debris.     granuloma noted to lateral right hallux nail border;  Tender to palpation     Interdigital Spaces clean, dry and without evidence of break in skin integrity   Psychiatric: She has a normal mood and affect. Her speech is normal.   Nursing note and vitals reviewed.        Assessment:       Encounter Diagnoses   Name Primary?    Type II diabetes mellitus with neurological manifestations Yes    Pernicious anemia     Fat pad atrophy of foot     Nail dermatophytosis          Plan:       Marina was seen today for diabetes mellitus, diabetic foot exam and nail care.    Diagnoses and all orders for this visit:    Type II diabetes mellitus with neurological manifestations    Pernicious anemia    Fat pad atrophy of foot    Nail dermatophytosis      I counseled the patient on her conditions, their implications and medical management.    - Shoe inspection. Diabetic Foot  Education. Patient reminded of the importance of good nutrition and blood sugar control to help prevent podiatric complications of diabetes. Patient instructed on proper foot hygeine. We discussed wearing proper shoe gear, daily foot inspections, never walking without protective shoe gear, never putting sharp instruments to feet.     - With patient's permission, nails were aggressively reduced and debrided x 10 to their soft tissue attachment mechanically and with electric , removing all offending nail and debris. Patient relates relief following the procedure.  She will continue to monitor the areas daily, inspect her feet, wear protective shoe gear when ambulatory, moisturizer to maintain skin integrity and follow in this office in approximately 3-4 months, sooner p.r.n.

## 2018-01-12 ENCOUNTER — TELEPHONE (OUTPATIENT)
Dept: HEMATOLOGY/ONCOLOGY | Facility: CLINIC | Age: 83
End: 2018-01-12

## 2018-01-12 DIAGNOSIS — D68.9 COAGULATION DISORDER: Primary | ICD-10-CM

## 2018-01-12 NOTE — TELEPHONE ENCOUNTER
----- Message from Shana Thayer RN sent at 1/12/2018  2:04 PM CST -----  Contact: self      ----- Message -----  From: Josiah Alejandre  Sent: 1/12/2018   1:58 PM  To: Carroll DELGADO Jr Staff    Pt called in about wanting to schedule appt. Pt would like the nurse to give her a call back in regards to this matter      Pt can be reached at [phone]      TY

## 2018-01-24 ENCOUNTER — TELEPHONE (OUTPATIENT)
Dept: FAMILY MEDICINE | Facility: CLINIC | Age: 83
End: 2018-01-24

## 2018-01-24 ENCOUNTER — NURSE TRIAGE (OUTPATIENT)
Dept: ADMINISTRATIVE | Facility: CLINIC | Age: 83
End: 2018-01-24

## 2018-01-24 NOTE — TELEPHONE ENCOUNTER
Reason for Disposition   Intermittent chest pains persist > 3 days    Protocols used: ST CHEST PAIN-A-[place]    Mrs. Trimble has been experiencing intermittent chest pain for a few days. She states she took a nitroglycerin from a previous prescription yesterday, which improved the pain. Patient denies chest pain at this time she is requesting to follow up with Dr. Jimenez tomorrow.

## 2018-01-24 NOTE — TELEPHONE ENCOUNTER
Spoke to patient who states that she feels fine right now. Said she would like to see Dr. Jimenez for her intermittent chest pain. Told her that  Did not have an opening until Friday and offered her earlier appointment with NP or another provider. Patient refused and states that she will wait to see PCP. Instructed patient to call 911 if chest pain comes back. Verbalized understanding.

## 2018-01-24 NOTE — TELEPHONE ENCOUNTER
----- Message from Giana Childs sent at 1/24/2018  3:32 PM CST -----  Contact: self  Patient states she has been experiencing intermittent chest pain . She had chest pain yesterday and the day before. Today she has not had chest pain but would like to talk with doctor.    Patient states she left a message earlier today and had not heard from anyone. Patient can be reached at [phone].    thanks

## 2018-01-26 ENCOUNTER — OFFICE VISIT (OUTPATIENT)
Dept: FAMILY MEDICINE | Facility: CLINIC | Age: 83
End: 2018-01-26
Payer: MEDICARE

## 2018-01-26 VITALS
HEART RATE: 73 BPM | DIASTOLIC BLOOD PRESSURE: 70 MMHG | SYSTOLIC BLOOD PRESSURE: 132 MMHG | WEIGHT: 126 LBS | HEIGHT: 58 IN | TEMPERATURE: 98 F | OXYGEN SATURATION: 96 % | BODY MASS INDEX: 26.45 KG/M2

## 2018-01-26 DIAGNOSIS — I15.2 HYPERTENSION ASSOCIATED WITH DIABETES: ICD-10-CM

## 2018-01-26 DIAGNOSIS — I70.0 ATHEROSCLEROSIS OF AORTA: ICD-10-CM

## 2018-01-26 DIAGNOSIS — M47.816 LUMBAR FACET ARTHROPATHY: ICD-10-CM

## 2018-01-26 DIAGNOSIS — Z79.4 ENCOUNTER FOR LONG-TERM (CURRENT) USE OF INSULIN: ICD-10-CM

## 2018-01-26 DIAGNOSIS — E11.42 DIABETIC POLYNEUROPATHY ASSOCIATED WITH TYPE 2 DIABETES MELLITUS: ICD-10-CM

## 2018-01-26 DIAGNOSIS — D03.39: ICD-10-CM

## 2018-01-26 DIAGNOSIS — I69.354 HEMIPARESIS AFFECTING LEFT SIDE AS LATE EFFECT OF STROKE: ICD-10-CM

## 2018-01-26 DIAGNOSIS — R07.9 CHEST PAIN, UNSPECIFIED TYPE: Primary | ICD-10-CM

## 2018-01-26 DIAGNOSIS — C82.96 FOLLICULAR LYMPHOMA OF INTRAPELVIC LYMPH NODES, UNSPECIFIED FOLLICULAR LYMPHOMA TYPE: ICD-10-CM

## 2018-01-26 DIAGNOSIS — E11.59 HYPERTENSION ASSOCIATED WITH DIABETES: ICD-10-CM

## 2018-01-26 DIAGNOSIS — I10 ESSENTIAL HYPERTENSION: ICD-10-CM

## 2018-01-26 PROCEDURE — 99999 PR PBB SHADOW E&M-EST. PATIENT-LVL III: CPT | Mod: PBBFAC,,, | Performed by: INTERNAL MEDICINE

## 2018-01-26 PROCEDURE — 99215 OFFICE O/P EST HI 40 MIN: CPT | Mod: S$GLB,,, | Performed by: INTERNAL MEDICINE

## 2018-01-26 PROCEDURE — 99499 UNLISTED E&M SERVICE: CPT | Mod: S$GLB,,, | Performed by: INTERNAL MEDICINE

## 2018-01-26 RX ORDER — NITROGLYCERIN 0.4 MG/1
0.4 TABLET SUBLINGUAL EVERY 5 MIN PRN
Qty: 30 TABLET | Refills: 12 | Status: SHIPPED | OUTPATIENT
Start: 2018-01-26 | End: 2019-01-26

## 2018-01-26 NOTE — PROGRESS NOTES
Chief complaint:  Chest pain    82-year-old white female with multiple medical problems .  Patient somehow restarted her amlodipine 5 mg even though the bottle had stop written on it.  She developed some lower extremity edema as she has had before.  She noticed that she was posted taken and quit a couple days ago and it is getting better.  Also twice this week she awoke with central chest pain.  It was Sunday night and Tuesday night.  He was a central chest tightness that radiated to the back associated with a feeling that her breathing was tied up but not necessarily shortness of breath.  She had some old sublingual nitroglycerin ever given to her from an emergency room visit.  She only had 2 left.  She uses limited ease the pain.  Overall lasted 30-45 minutes.  She's had no chest pain during the day.  She is on omeprazole as her reflux medication.  She does confirm that she is on losartan for hypertension.  Her last PET stress test was unremarkable back in 2013 per GI no underlying known cardiac problems.         ROS:   CONST: weight stable. EYES: no vision change. ENT: no sore throat. CV: no chest pain w/ exertion. RESP: no shortness of breath. GI: no nausea, vomiting, diarrhea. No dysphagia. : Some recent urinary frequency and occasional dysuria. MUSCULOSKELETAL: no new myalgias or arthralgias other than a week and a half of increase lower lumbar pain. SKIN: no new changes. NEURO: no focal deficits. PSYCH: no new issues. ENDOCRINE: no polyuria. HEME: no lymph nodes. ALLERGY: no general pruritis.      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 Shot monthly.  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  13) iron deficiency anemia. 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  Vertebral compression fracture treated with vertebral plasty   19.  Prevnar 13 2016  20.  Pelvic fracture December 2016  21.  GI bleed 2017, thickening of the distal esophagus on CT scan, EGD biopsy benign - duodenal stenosis not obstructing  22.  Eventual total Hyst    Allergies: Prednisone, hydrocodone, Lotensin, oxybutynin     Social history: The patient is a nonsmoker who is retired. The patient is . She rarely drank alcohol.     Family history: There is a significant family history of cardiac, pulmonary, and endocrine disease. There is no family history of head and neck malignancy.        Vital signs as above, Gen: no distress  EYES: conjunctiva clear, non-icteric, PERRL  ENT: nose clear, nasal mucosa normal, oropharynx clear and moist, teeth good  NECK:supple, thyroid non-palpable  RESP: effort is good, lungs clear  CV: heart RRR w/o murmur, gallops or rubs; no carotid bruits, trace ankle edema  GI: abdomen soft, non-distended, non-tender, no hepatosplenomegaly  MS: Kyphotic with walker, no clubbing or cyanosis of the digits, obvious scoliosis of the lumbar spine.    SKIN: no rashes, warm to touch      Labs, prior cardiac testing and x-rays reviewed.  Her EKG today personally interpreted by me is normal sinus rhythm with no acute ST segment or T-wave changes    Marina was seen today for chest pain.    Diagnoses and all orders for this visit:    Chest pain,  unspecified type, new problem, suspicious enough for angina to warrant assessment of ischemia.  Also could be typical for reflux and esophageal spasm although she is on PPI therapy.  I will prescribe her some sublingual nitroglycerin to have on hand with instruction that should she have a recurrence that is more severe or fails to respond to nitroglycerin and/or recurs after nitroglycerin she should go to the emergency room.  We will attempt do a pharmacological nuclear stress test otherwise.  -     NM Myocardial Perfusion Spect Multi Pharmacologic; Future  -     NM Multi Pharm Stress Cardiac Component; Future    Diabetes mellitus with neurological manifestations, uncontrolled, labs reviewed    Hemiparesis affecting left side as late effect of stroke, history noted    Atherosclerosis of aorta, noted in the records    Follicular lymphoma of intrapelvic lymph nodes, unspecified follicular lymphoma type, history noted    Hypertension associated with diabetes, chronic and stable    Essential hypertension    Diabetic polyneuropathy associated with type 2 diabetes mellitus    Lumbar facet arthropathy    Encounter for long-term (current) use of insulin, patient does note that her sugars are normal in the morning and uses no rapid insulin but then in the evening it is elevated and she had questions and it would be indicative of using some rapid insulin with the meals and high sugars during the other portions of the day.  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

## 2018-01-29 ENCOUNTER — TELEPHONE (OUTPATIENT)
Dept: FAMILY MEDICINE | Facility: CLINIC | Age: 83
End: 2018-01-29

## 2018-01-30 NOTE — TELEPHONE ENCOUNTER
----- Message from Angelica Gunderson sent at 1/29/2018  8:48 AM CST -----  Regarding: EKG ORDER  Please put in EKG order, thanks. Angelica [phone]

## 2018-01-30 NOTE — TELEPHONE ENCOUNTER
Need more info, prob best to clarify w pt. She was just seen and Nuc stress ordered. I wonder if she is calling to check on setting it up. May not have heard and this could have been sent to referrals to check on. Please call pt, thanks

## 2018-01-30 NOTE — TELEPHONE ENCOUNTER
Attempted to contact patient regarding her request for an EKG order. No answer,  Left VM instructing patient to call the clinic at her earliest.

## 2018-02-07 ENCOUNTER — TELEPHONE (OUTPATIENT)
Dept: FAMILY MEDICINE | Facility: CLINIC | Age: 83
End: 2018-02-07

## 2018-02-07 ENCOUNTER — HOSPITAL ENCOUNTER (OUTPATIENT)
Dept: RADIOLOGY | Facility: HOSPITAL | Age: 83
Discharge: HOME | End: 2018-02-07
Attending: INTERNAL MEDICINE
Payer: MEDICARE

## 2018-02-07 ENCOUNTER — HOSPITAL ENCOUNTER (OUTPATIENT)
Dept: CARDIOLOGY | Facility: HOSPITAL | Age: 83
Discharge: HOME | End: 2018-02-07
Attending: INTERNAL MEDICINE
Payer: MEDICARE

## 2018-02-07 DIAGNOSIS — R07.9 CHEST PAIN, UNSPECIFIED TYPE: ICD-10-CM

## 2018-02-07 LAB — DIASTOLIC DYSFUNCTION: NO

## 2018-02-07 PROCEDURE — 93018 CV STRESS TEST I&R ONLY: CPT | Mod: ,,, | Performed by: INTERNAL MEDICINE

## 2018-02-07 PROCEDURE — 93017 CV STRESS TEST TRACING ONLY: CPT

## 2018-02-07 PROCEDURE — A9502 TC99M TETROFOSMIN: HCPCS

## 2018-02-07 PROCEDURE — 93016 CV STRESS TEST SUPVJ ONLY: CPT | Mod: ,,, | Performed by: INTERNAL MEDICINE

## 2018-02-07 PROCEDURE — 78452 HT MUSCLE IMAGE SPECT MULT: CPT | Mod: 26,,, | Performed by: INTERNAL MEDICINE

## 2018-02-07 PROCEDURE — 63600175 PHARM REV CODE 636 W HCPCS

## 2018-02-07 RX ORDER — REGADENOSON 0.08 MG/ML
INJECTION, SOLUTION INTRAVENOUS
Status: DISPENSED
Start: 2018-02-07 | End: 2018-02-07

## 2018-02-07 NOTE — TELEPHONE ENCOUNTER
----- Message from Butch Hodges sent at 2/6/2018 10:31 AM CST -----  Contact: -Smith  Called to receive instructions on what pt should & should not do/ eat before procedure.  can be reached @ [phone].

## 2018-02-08 NOTE — TELEPHONE ENCOUNTER
Patient informed of Nuclear Stress Test Results. Patient verbalized understanding. No further questions or concerns at this time.

## 2018-02-08 NOTE — TELEPHONE ENCOUNTER
Patient had nuclear stress test and will be anxious about result    Dr. MITCHELL says the recent nuclear stress test came out perfect, all normal

## 2018-02-09 DIAGNOSIS — E53.8 VITAMIN B12 DEFICIENCY: ICD-10-CM

## 2018-02-09 RX ORDER — CYANOCOBALAMIN 1000 UG/ML
INJECTION, SOLUTION INTRAMUSCULAR; SUBCUTANEOUS
Qty: 6 ML | Refills: 0 | Status: SHIPPED | OUTPATIENT
Start: 2018-02-09 | End: 2018-07-11 | Stop reason: SDUPTHER

## 2018-02-27 RX ORDER — OMEPRAZOLE 40 MG/1
CAPSULE, DELAYED RELEASE ORAL
Qty: 90 CAPSULE | Refills: 90 | Status: SHIPPED | OUTPATIENT
Start: 2018-02-27

## 2018-03-05 ENCOUNTER — LAB VISIT (OUTPATIENT)
Dept: LAB | Facility: HOSPITAL | Age: 83
End: 2018-03-05
Attending: INTERNAL MEDICINE
Payer: MEDICARE

## 2018-03-05 DIAGNOSIS — D47.2 MGUS (MONOCLONAL GAMMOPATHY OF UNKNOWN SIGNIFICANCE): ICD-10-CM

## 2018-03-05 DIAGNOSIS — D64.9 ANEMIA: ICD-10-CM

## 2018-03-05 DIAGNOSIS — D50.0 ANEMIA DUE TO CHRONIC BLOOD LOSS: ICD-10-CM

## 2018-03-05 DIAGNOSIS — E56.1 VITAMIN K DEFICIENCY: ICD-10-CM

## 2018-03-05 DIAGNOSIS — E53.8 VITAMIN B12 DEFICIENCY: ICD-10-CM

## 2018-03-05 LAB
ALBUMIN SERPL BCP-MCNC: 4 G/DL
ALP SERPL-CCNC: 70 U/L
ALT SERPL W/O P-5'-P-CCNC: 14 U/L
ANION GAP SERPL CALC-SCNC: 10 MMOL/L
APTT BLDCRRT: 23.1 SEC
AST SERPL-CCNC: 24 U/L
BILIRUB SERPL-MCNC: 0.6 MG/DL
BUN SERPL-MCNC: 11 MG/DL
CALCIUM SERPL-MCNC: 10 MG/DL
CHLORIDE SERPL-SCNC: 102 MMOL/L
CO2 SERPL-SCNC: 32 MMOL/L
CREAT SERPL-MCNC: 0.8 MG/DL
ERYTHROCYTE [DISTWIDTH] IN BLOOD BY AUTOMATED COUNT: 12.9 %
EST. GFR  (AFRICAN AMERICAN): >60 ML/MIN/1.73 M^2
EST. GFR  (NON AFRICAN AMERICAN): >60 ML/MIN/1.73 M^2
FERRITIN SERPL-MCNC: 309 NG/ML
GLUCOSE SERPL-MCNC: 66 MG/DL
HCT VFR BLD AUTO: 42.4 %
HGB BLD-MCNC: 14.2 G/DL
MCH RBC QN AUTO: 30.7 PG
MCHC RBC AUTO-ENTMCNC: 33.5 G/DL
MCV RBC AUTO: 92 FL
NEUTROPHILS # BLD AUTO: 5.3 K/UL
PLATELET # BLD AUTO: 249 K/UL
PMV BLD AUTO: 10.6 FL
POTASSIUM SERPL-SCNC: 3.6 MMOL/L
PROT SERPL-MCNC: 7.5 G/DL
RBC # BLD AUTO: 4.63 M/UL
SODIUM SERPL-SCNC: 144 MMOL/L
VIT B12 SERPL-MCNC: 1327 PG/ML
WBC # BLD AUTO: 7.4 K/UL

## 2018-03-05 PROCEDURE — 84165 PROTEIN E-PHORESIS SERUM: CPT

## 2018-03-05 PROCEDURE — 85730 THROMBOPLASTIN TIME PARTIAL: CPT

## 2018-03-05 PROCEDURE — 82607 VITAMIN B-12: CPT

## 2018-03-05 PROCEDURE — 84165 PROTEIN E-PHORESIS SERUM: CPT | Mod: 26,,, | Performed by: PATHOLOGY

## 2018-03-05 PROCEDURE — 80053 COMPREHEN METABOLIC PANEL: CPT

## 2018-03-05 PROCEDURE — 85027 COMPLETE CBC AUTOMATED: CPT | Mod: PO

## 2018-03-05 PROCEDURE — 82728 ASSAY OF FERRITIN: CPT

## 2018-03-06 ENCOUNTER — OFFICE VISIT (OUTPATIENT)
Dept: HEMATOLOGY/ONCOLOGY | Facility: CLINIC | Age: 83
End: 2018-03-06
Payer: MEDICARE

## 2018-03-06 VITALS
SYSTOLIC BLOOD PRESSURE: 124 MMHG | WEIGHT: 127.63 LBS | HEART RATE: 72 BPM | HEIGHT: 58 IN | BODY MASS INDEX: 26.79 KG/M2 | DIASTOLIC BLOOD PRESSURE: 75 MMHG

## 2018-03-06 DIAGNOSIS — D50.0 IRON DEFICIENCY ANEMIA DUE TO CHRONIC BLOOD LOSS: ICD-10-CM

## 2018-03-06 DIAGNOSIS — C82.96 FOLLICULAR LYMPHOMA OF INTRAPELVIC LYMPH NODES, UNSPECIFIED FOLLICULAR LYMPHOMA TYPE: Primary | ICD-10-CM

## 2018-03-06 DIAGNOSIS — D47.2 MGUS (MONOCLONAL GAMMOPATHY OF UNKNOWN SIGNIFICANCE): ICD-10-CM

## 2018-03-06 DIAGNOSIS — E53.8 VITAMIN B12 DEFICIENCY: ICD-10-CM

## 2018-03-06 LAB
ALBUMIN SERPL ELPH-MCNC: 3.87 G/DL
ALPHA1 GLOB SERPL ELPH-MCNC: 0.34 G/DL
ALPHA2 GLOB SERPL ELPH-MCNC: 1.01 G/DL
B-GLOBULIN SERPL ELPH-MCNC: 0.74 G/DL
GAMMA GLOB SERPL ELPH-MCNC: 1.04 G/DL
PATHOLOGIST INTERPRETATION SPE: NORMAL
PROT SERPL-MCNC: 7 G/DL

## 2018-03-06 PROCEDURE — 99499 UNLISTED E&M SERVICE: CPT | Mod: S$GLB,,, | Performed by: INTERNAL MEDICINE

## 2018-03-06 PROCEDURE — 99999 PR PBB SHADOW E&M-EST. PATIENT-LVL III: CPT | Mod: PBBFAC,,, | Performed by: INTERNAL MEDICINE

## 2018-03-06 PROCEDURE — 3074F SYST BP LT 130 MM HG: CPT | Mod: S$GLB,,, | Performed by: INTERNAL MEDICINE

## 2018-03-06 PROCEDURE — 99214 OFFICE O/P EST MOD 30 MIN: CPT | Mod: S$GLB,,, | Performed by: INTERNAL MEDICINE

## 2018-03-06 PROCEDURE — 3078F DIAST BP <80 MM HG: CPT | Mod: S$GLB,,, | Performed by: INTERNAL MEDICINE

## 2018-03-06 NOTE — PROGRESS NOTES
"HISTORY OF PRESENT ILLNESS:  Ms. Andrade is an 82-year-old woman who was   treated for lymphoma in 1997.  Biopsy of retroperitoneal lymph node was   interpreted as "malignant lymphoma, follicular center cell type, small cleaved   cell predominant, diffuse sclerotic growth pattern."  She received six courses   of CHOP chemotherapy followed by radiation to the periaortic lymph nodes in the   region of L1 to L5.  All therapy was completed in March 1998.    Since that time, she has not had evidence of lymphoma recurrence.  She is not   having fevers or sweats.  She has had no substantial weight loss.  She has not   noted any enlarged lymph nodes.  She has had no recent infections.    In 2004, she developed iron deficiency anemia.  A video capsule study showed a   small intestinal lesion that was probably an AVM.  She required several   infusions of intravenous iron including one in November 2009.  She was   periodically followed with blood counts and ferritin levels after that and in   September 2017, the anemia recurred with hemoglobin 9.2 and ferritin 9.  She was   treated with Injectafer intravenously as two infusions and her anemia had   resolved by 11/09/2017.  She was not aware of any bleeding.  She has a duodenal   stricture.    In 2007, a benign colonic stricture was surgically removed. Wound healing was   extremely slow.  In August 2007, her B12 level was low and she began to take 1   mg of parenteral B12 IM every three weeks and continues with it.  She once had prolongation   of the partial thromboplastin time.  No explanation for this was found.  She   was given an empiric trial of vitamin K and the PTT returned to normal and   remained normal.  She has not taken vitamin K for many years.    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  At that time, she has had a faint IgG lambda " paraprotein found   in her serum.    She continues to have back pain, which has bothered her for many years.  She   also has diabetes mellitus, diastolic dysfunction and some left-sided weakness   from a prior CVA.  She recently had a study for coronary artery disease and no   evidence of that was found.    Her  has been treated for lung cancer, but he appears to have been cured   by the treatment.  He has not had any additional treatment for several years.    ADDITIONAL PAST HISTORY, SYSTEM REVIEW, SOCIAL HISTORY AND FAMILY HISTORY:  Have   been reviewed and updated in the electronic record.    PHYSICAL EXAMINATION:  GENERAL APPEARANCE:  Well-developed, well-nourished woman who requires minimal   assistance to climb on to an examination table.  EYES:  No jaundice or pallor.  EARS:  Clear canals and membranes.  NOSE:  Clear nares.  SINUSES:  No tenderness.  MOUTH AND THROAT:  Partial left glossectomy.  No signs of tumor recurrence.    Adequate dentition.  NECK:  No masses or bruits.  No thyroid abnormalities.  LYMPH NODES:  No enlarged cervical, axillary or inguinal nodes.  CHEST AND LUNGS:  Normal respiratory effort.  Clear to auscultation and   percussion.  HEART:  Regular rate and rhythm without murmur or gallop.  ABDOMEN:  Soft without masses or tenderness.  No hepatosplenomegaly.  EXTREMITIES:  No edema or cyanosis.  PERIPHERAL VASCULATURE:  Adequate pulses in the feet and ankles.  NEUROLOGIC:  Some weakness of the left arm and leg.  Memory is good.  She is   fully oriented.  SKIN:  No suspicious lesions in the areas examined.    LABORATORY STUDIES:  Blood counts include hemoglobin 14.2, WBC 7400 and   platelets 249,000.  Ferritin is normal at 309 and B12 is good at 1327.  PTT is   normal at 21.3.  Comprehensive metabolic profile is normal with the exception of   glucose 66.  A serum protein electrophoresis is pending.    IMPRESSION:  1.  History of retroperitoneal lymphoma, without evidence of  recurrence.  2.  History of intermittent gastrointestinal bleeding, probably from   arteriovenous malformations.  3.  Vitamin B12 deficiency.  4.  Malabsorption syndrome, probably a consequence of prior radiation and   surgery.  5.  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    RECOMMENDATIONS:  1.  Continue B12 1 mg subcutaneously every three weeks.  2.  In six months, blood at [facility] for CBC and ferritin.  3.  Return visit in one year with CBC, CMP, vitamin B12 level, ferritin, partial   thromboplastin time and serum protein electrophoresis.    Ms. Andrade had a number of questions about her prognosis related to lymphoma   (it is now excellent), the bleeding issue, the paraprotein and the vitamin B12.    Most of her 30-minute visit today was devoted to counseling her about those   matters.      SINTIA/CALIN  dd: 03/06/2018 11:30:21 (CST)  td: 03/07/2018 10:37:57 (CST)  Doc ID   #[id number]  Job ID #[id number]    CC:

## 2018-04-04 ENCOUNTER — OFFICE VISIT (OUTPATIENT)
Dept: FAMILY MEDICINE | Facility: CLINIC | Age: 83
End: 2018-04-04
Payer: MEDICARE

## 2018-04-04 VITALS
TEMPERATURE: 99 F | BODY MASS INDEX: 26.77 KG/M2 | OXYGEN SATURATION: 98 % | WEIGHT: 128.06 LBS | SYSTOLIC BLOOD PRESSURE: 150 MMHG | DIASTOLIC BLOOD PRESSURE: 68 MMHG | HEART RATE: 73 BPM

## 2018-04-04 DIAGNOSIS — M51.36 DDD (DEGENERATIVE DISC DISEASE), LUMBAR: ICD-10-CM

## 2018-04-04 DIAGNOSIS — M48.061 SPINAL STENOSIS OF LUMBAR REGION, UNSPECIFIED WHETHER NEUROGENIC CLAUDICATION PRESENT: ICD-10-CM

## 2018-04-04 DIAGNOSIS — M46.1 SACROILIITIS: Primary | ICD-10-CM

## 2018-04-04 PROCEDURE — 99999 PR PBB SHADOW E&M-EST. PATIENT-LVL V: CPT | Mod: PBBFAC,,, | Performed by: NURSE PRACTITIONER

## 2018-04-04 PROCEDURE — 3078F DIAST BP <80 MM HG: CPT | Mod: CPTII,S$GLB,, | Performed by: NURSE PRACTITIONER

## 2018-04-04 PROCEDURE — 99499 UNLISTED E&M SERVICE: CPT | Mod: S$GLB,,, | Performed by: NURSE PRACTITIONER

## 2018-04-04 PROCEDURE — 3077F SYST BP >= 140 MM HG: CPT | Mod: CPTII,S$GLB,, | Performed by: NURSE PRACTITIONER

## 2018-04-04 PROCEDURE — 99213 OFFICE O/P EST LOW 20 MIN: CPT | Mod: S$GLB,,, | Performed by: NURSE PRACTITIONER

## 2018-04-04 RX ORDER — ACETAMINOPHEN AND CODEINE PHOSPHATE 300; 60 MG/1; MG/1
1 TABLET ORAL EVERY 8 HOURS PRN
Qty: 60 TABLET | Refills: 3 | Status: SHIPPED | OUTPATIENT
Start: 2018-04-04 | End: 2018-06-27 | Stop reason: SDUPTHER

## 2018-04-04 NOTE — PROGRESS NOTES
This dictation has been generated using Dragon Dictation some phonetic errors may occur.     Problem List Items Addressed This Visit     DDD (degenerative disc disease), lumbar    Sacroiliitis - Primary    Lumbar spinal stenosis          Orders Placed This Encounter    acetaminophen-codeine 300-60mg (TYLENOL #4) 300-60 mg Tab     Degenerative disc disease, sacroiliitis, lumbar spinal stenosis.  Refill ×1 pain med as above.  Decrease quantity.  Follow-up with primary care physician for ongoing refills.    Follow-up if symptoms worsen or fail to improve.    ________________________________________________________________  ________________________________________________________________      Chief Complaint   Patient presents with    Back Pain     History of present illness  This 82 y.o. presents today for complaint of back pain.  Patient has degenerative disc disease, sacroiliitis, and lumbar spinal stenosis.  She has had injections in her back without improvement.  She has had history of injury.  She's here for refill of medicine.  She needs to follow-up with primary care physician for 3 months supply.  Review of systems  No change in bowel or bladder habits  No lumbar radiculopathy        Past Medical History:   Diagnosis Date    Anemia     Aneurysm of aorta     Back pain     Cataract     Diabetes mellitus     sees Endocrine yearly ??   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    Melanoma 2011    melanoma  in situ of R nasal tip, excised with FTSG and treated with Aldara    MGUS (monoclonal gammopathy of unknown significance)     Dr Hodges    Osteopenia     Senile osteoporosis     Skin cancer     Skin neoplasm     Stroke     left weak -     Tongue cancer 2010    [id number]    Trouble in sleeping     Urinary incontinence     Vitamin B 12 deficiency     Vitamin B12 deficiency     Vitamin K deficiency        Past Surgical History:   Procedure Laterality Date    APPENDECTOMY      CATARACT EXTRACTION W/  INTRAOCULAR LENS IMPLANT Left 10/16/14    OS ()    CHOLECYSTECTOMY      COLECTOMY      COLONOSCOPY      EYE SURGERY Bilateral     cataracts    eyelid surgery      HYSTERECTOMY      PARTIAL GLOSSECTOMY  2010    SELECTIVE NECK DISSECTION  2010    tonsilectomy      TONSILLECTOMY         Family History   Problem Relation Age of Onset    Cataracts Mother     Macular degeneration Mother     Blindness Mother     No Known Problems Father     No Known Problems Son     No Known Problems Son     Coronary artery disease      COPD      No Known Problems Sister     No Known Problems Brother     No Known Problems Maternal Aunt     No Known Problems Maternal Uncle     No Known Problems Paternal Aunt     No Known Problems Paternal Uncle     No Known Problems Maternal Grandmother     No Known Problems Maternal Grandfather     No Known Problems Paternal Grandmother     No Known Problems Paternal Grandfather     Skin cancer Neg Hx     Melanoma Neg Hx     Amblyopia Neg Hx     Cancer Neg Hx     Diabetes Neg Hx     Glaucoma Neg Hx     Hypertension Neg Hx     Retinal detachment Neg Hx     Strabismus Neg Hx     Stroke Neg Hx     Thyroid disease Neg Hx     Colon cancer Neg Hx     Esophageal cancer Neg Hx        Social History     Social History    Marital status:      Spouse name: N/A    Number of children: N/A    Years of education: N/A     Social History Main Topics     "Smoking status: Never Smoker    Smokeless tobacco: Never Used    Alcohol use No    Drug use: No    Sexual activity: No     Other Topics Concern    Are You Pregnant Or Think You May Be? No    Breast-Feeding No     Social History Narrative    She is . She lives on the [facility].       Current Outpatient Prescriptions   Medication Sig Dispense Refill    ACCU-CHEK FASTCLIX Misc USE TO CHECK BLOOD GLUCOSE THREE TIMES DAILY 1000 each 11    ACCU-CHEK SMARTVIEW TEST STRIP Strp USE TO CHECK BLOOD GLUCOSE 3 TIMES DAILY 300 strip 11    acetaminophen (TYLENOL) 500 MG tablet Take 1 tablet (500 mg total) by mouth every 4 (four) hours as needed for Pain.  0    acetaminophen-codeine 300-60mg (TYLENOL #4) 300-60 mg Tab Take 1 tablet by mouth every 8 (eight) hours as needed (back pain). 60 tablet 3    amitriptyline (ELAVIL) 10 MG tablet Take 10 mg by mouth once daily.       aspirin (ECOTRIN) 81 MG EC tablet Take 81 mg by mouth once daily.      atorvastatin (LIPITOR) 40 MG tablet TAKE 1 TABLET ONE TIME DAILY 90 tablet 3    BD ULTRA-FINE BEVERLY PEN NEEDLES 32 gauge x 5/32" Ndle USE TWICE DAILY 500 each 12    blood-glucose meter Misc Use ad directed (Accu Check Beverly smartview meter) 1 each 4    clopidogrel (PLAVIX) 75 mg tablet TAKE 1 TABLET ONE TIME DAILY 90 tablet 3    cyanocobalamin 1,000 mcg/mL injection INJECT 1 ML UNDER THE SKIN EVERY 2 WEEKS 6 mL 0    insulin aspart (NOVOLOG FLEXPEN) 100 unit/mL InPn pen Inject 4/6/6 units into skin with correction scale 150-200 +1, 201-250 +2, 251-300 +3, 301-350 +4, >350 +5. 6 mL 4    LEVEMIR FLEXTOUCH 100 unit/mL (3 mL) InPn pen INJECT 35 UNITS INTO THE SKIN EVERY EVENING. 30 mL 4    losartan (COZAAR) 100 MG Tab TAKE 1 TABLET ONE TIME DAILY 90 tablet 90    multivitamin (CHEWABLE MULTI VITAMIN) Chew Take 2 tablets by mouth once daily.       naproxen (NAPROSYN) 500 MG tablet Take 1 tablet (500 mg total) by mouth 2 (two) times daily with meals. 11 tablet 0    " "nitroGLYCERIN (NITROSTAT) 0.4 MG SL tablet Place 1 tablet (0.4 mg total) under the tongue every 5 (five) minutes as needed for Chest pain. 30 tablet 12    NOVOFINE 32 32 gauge x 1/4" Ndle USE WITH LEVEMIR PEN EVERY EVENING 500 each 12    omeprazole (PRILOSEC) 40 MG capsule TAKE 1 CAPSULE EVERY MORNING 90 capsule 90    prochlorperazine (COMPAZINE) 10 MG tablet Take 1 tablet (10 mg total) by mouth every 6 (six) hours as needed. 30 tablet 3    PROCTOZONE-HC 2.5 % rectal cream Place rectally 2 (two) times daily. 90 g 12    triamcinolone acetonide 0.1% (KENALOG) 0.1 % cream       TRUEPLUS LANCETS 28 gauge Misc USE  TO CHECK BLOOD SUGAR THREE TIMES DAILY 300 each 0    UNABLE TO FIND Places uses [place] daily      vitamin D 1000 units Tab Take 1 tablet (1,000 Units total) by mouth once daily.      VOLTAREN 1 % Gel       cycloSPORINE (RESTASIS) 0.05 % ophthalmic emulsion Place 0.4 mLs (1 drop total) into both eyes 2 (two) times daily. 180 vial 12     No current facility-administered medications for this visit.        Review of patient's allergies indicates:   Allergen Reactions    Baclofen Nausea And Vomiting and Other (See Comments)     Dizziness    Benazepril      Other reaction(s): Rash,Swelling    Carisoprodol      Other reaction(s): Dysphoria    Desloratadine      Other reaction(s): Nausea  Other reaction(s): Nausea    Hydrocodone      Other reaction(s): disorientation    Methocarbamol      Other reaction(s): Nausea    Omeprazole Nausea And Vomiting    Oxybutynin      Other reaction(s): Hallucinations    Oxycodone Nausea And Vomiting    Tramadol      Other reaction(s): dizziness    Benazepril-hydrochlorothiazide Rash     Other reaction(s): Swelling    Metformin Diarrhea    Prednisone Rash       Physical examination  Vitals Reviewed  Gen. Well-dressed well-nourished    Skin warm dry and intact.  No rashes noted.  Neuro. Awake alert oriented x4.  Normal judgment and cognition noted.  Extremities " no clubbing cyanosis or edema noted.     Call or return to clinic prn if these symptoms worsen or fail to improve as anticipated.

## 2018-04-26 DIAGNOSIS — E11.69 COMBINED HYPERLIPIDEMIA ASSOCIATED WITH TYPE 2 DIABETES MELLITUS: ICD-10-CM

## 2018-04-26 DIAGNOSIS — E78.2 COMBINED HYPERLIPIDEMIA ASSOCIATED WITH TYPE 2 DIABETES MELLITUS: ICD-10-CM

## 2018-04-27 RX ORDER — INSULIN DETEMIR 100 [IU]/ML
INJECTION, SOLUTION SUBCUTANEOUS
Qty: 30 ML | Refills: 4 | Status: SHIPPED | OUTPATIENT
Start: 2018-04-27

## 2018-04-27 RX ORDER — ATORVASTATIN CALCIUM 40 MG/1
TABLET, FILM COATED ORAL
Qty: 90 TABLET | Refills: 3 | Status: SHIPPED | OUTPATIENT
Start: 2018-04-27 | End: 2019-04-02 | Stop reason: SDUPTHER

## 2018-04-27 RX ORDER — PEN NEEDLE, DIABETIC 31 GX5/16"
NEEDLE, DISPOSABLE MISCELLANEOUS
Qty: 500 EACH | Refills: 12 | Status: SHIPPED | OUTPATIENT
Start: 2018-04-27

## 2018-04-27 RX ORDER — CLOPIDOGREL BISULFATE 75 MG/1
TABLET ORAL
Qty: 90 TABLET | Refills: 3 | Status: SHIPPED | OUTPATIENT
Start: 2018-04-27

## 2018-05-11 ENCOUNTER — TELEPHONE (OUTPATIENT)
Dept: FAMILY MEDICINE | Facility: CLINIC | Age: 83
End: 2018-05-11

## 2018-05-11 NOTE — TELEPHONE ENCOUNTER
Patient states no longer needs appointment.  States filled Tylenol #4 and now able to rest and sleep since back pain resolved medication.  Reports was unable to sleep and rest x 2 days due to back pain.

## 2018-05-11 NOTE — TELEPHONE ENCOUNTER
----- Message from Shana Sesay sent at 5/11/2018 10:29 AM CDT -----  Contact: self  Pt calling to state that she can not sleep and would like to see if she can be seen today. Please call [phone].

## 2018-05-28 ENCOUNTER — OFFICE VISIT (OUTPATIENT)
Dept: PODIATRY | Facility: CLINIC | Age: 83
End: 2018-05-28
Payer: MEDICARE

## 2018-05-28 VITALS
WEIGHT: 128.06 LBS | HEIGHT: 58 IN | DIASTOLIC BLOOD PRESSURE: 60 MMHG | SYSTOLIC BLOOD PRESSURE: 142 MMHG | BODY MASS INDEX: 26.88 KG/M2

## 2018-05-28 DIAGNOSIS — L90.9 FAT PAD ATROPHY OF FOOT: ICD-10-CM

## 2018-05-28 DIAGNOSIS — E11.49 TYPE II DIABETES MELLITUS WITH NEUROLOGICAL MANIFESTATIONS: Primary | ICD-10-CM

## 2018-05-28 DIAGNOSIS — D51.0 PERNICIOUS ANEMIA: ICD-10-CM

## 2018-05-28 DIAGNOSIS — B35.1 NAIL DERMATOPHYTOSIS: ICD-10-CM

## 2018-05-28 PROCEDURE — 99499 UNLISTED E&M SERVICE: CPT | Mod: S$GLB,,, | Performed by: PODIATRIST

## 2018-05-28 PROCEDURE — 11721 DEBRIDE NAIL 6 OR MORE: CPT | Mod: Q9,S$GLB,, | Performed by: PODIATRIST

## 2018-05-28 PROCEDURE — 99999 PR PBB SHADOW E&M-EST. PATIENT-LVL III: CPT | Mod: PBBFAC,,, | Performed by: PODIATRIST

## 2018-05-28 PROCEDURE — 99499 UNLISTED E&M SERVICE: CPT | Mod: S$PBB,,, | Performed by: PODIATRIST

## 2018-05-29 ENCOUNTER — TELEPHONE (OUTPATIENT)
Dept: FAMILY MEDICINE | Facility: CLINIC | Age: 83
End: 2018-05-29

## 2018-05-29 DIAGNOSIS — Z12.31 BREAST CANCER SCREENING BY MAMMOGRAM: Primary | ICD-10-CM

## 2018-05-29 NOTE — TELEPHONE ENCOUNTER
----- Message from Augusta Cortes sent at 5/29/2018  2:32 PM CDT -----  Contact: self  Patient requesting a mammogram. Please contact her at [phone].    Thanks!

## 2018-06-07 ENCOUNTER — OFFICE VISIT (OUTPATIENT)
Dept: FAMILY MEDICINE | Facility: CLINIC | Age: 83
End: 2018-06-07
Payer: MEDICARE

## 2018-06-07 VITALS
HEIGHT: 58 IN | OXYGEN SATURATION: 97 % | SYSTOLIC BLOOD PRESSURE: 124 MMHG | WEIGHT: 133.81 LBS | HEART RATE: 67 BPM | BODY MASS INDEX: 28.09 KG/M2 | DIASTOLIC BLOOD PRESSURE: 76 MMHG

## 2018-06-07 DIAGNOSIS — D03.39: ICD-10-CM

## 2018-06-07 DIAGNOSIS — I70.0 ATHEROSCLEROSIS OF AORTA: ICD-10-CM

## 2018-06-07 DIAGNOSIS — I69.354 HEMIPARESIS AFFECTING LEFT SIDE AS LATE EFFECT OF STROKE: ICD-10-CM

## 2018-06-07 DIAGNOSIS — C82.96 FOLLICULAR LYMPHOMA OF INTRAPELVIC LYMPH NODES, UNSPECIFIED FOLLICULAR LYMPHOMA TYPE: ICD-10-CM

## 2018-06-07 DIAGNOSIS — Z00.00 ENCOUNTER FOR PREVENTIVE HEALTH EXAMINATION: Primary | ICD-10-CM

## 2018-06-07 DIAGNOSIS — E11.42 DIABETIC POLYNEUROPATHY ASSOCIATED WITH TYPE 2 DIABETES MELLITUS: ICD-10-CM

## 2018-06-07 DIAGNOSIS — M47.816 LUMBAR FACET ARTHROPATHY: ICD-10-CM

## 2018-06-07 DIAGNOSIS — Z79.4 ENCOUNTER FOR LONG-TERM (CURRENT) USE OF INSULIN: ICD-10-CM

## 2018-06-07 DIAGNOSIS — R53.81 DEBILITY: ICD-10-CM

## 2018-06-07 DIAGNOSIS — E11.69 COMBINED HYPERLIPIDEMIA ASSOCIATED WITH TYPE 2 DIABETES MELLITUS: ICD-10-CM

## 2018-06-07 DIAGNOSIS — M46.1 SACROILIITIS: ICD-10-CM

## 2018-06-07 DIAGNOSIS — E78.2 COMBINED HYPERLIPIDEMIA ASSOCIATED WITH TYPE 2 DIABETES MELLITUS: ICD-10-CM

## 2018-06-07 DIAGNOSIS — Z23 NEED FOR VACCINATION: ICD-10-CM

## 2018-06-07 PROCEDURE — 3074F SYST BP LT 130 MM HG: CPT | Mod: CPTII,S$GLB,, | Performed by: NURSE PRACTITIONER

## 2018-06-07 PROCEDURE — 99499 UNLISTED E&M SERVICE: CPT | Mod: S$PBB,,, | Performed by: NURSE PRACTITIONER

## 2018-06-07 PROCEDURE — G0439 PPPS, SUBSEQ VISIT: HCPCS | Mod: S$GLB,,, | Performed by: NURSE PRACTITIONER

## 2018-06-07 PROCEDURE — 3078F DIAST BP <80 MM HG: CPT | Mod: CPTII,S$GLB,, | Performed by: NURSE PRACTITIONER

## 2018-06-07 PROCEDURE — 99999 PR PBB SHADOW E&M-EST. PATIENT-LVL V: CPT | Mod: PBBFAC,,, | Performed by: NURSE PRACTITIONER

## 2018-06-07 NOTE — PROGRESS NOTES
"Marina Trimble presented for a  Medicare AWV and comprehensive Health Risk Assessment today. The following components were reviewed and updated:    · Medical history  · Family History  · Social history  · Allergies and Current Medications  · Health Risk Assessment  · Health Maintenance  · Care Team     ** See Completed Assessments for Annual Wellness Visit within the encounter summary.**       The following assessments were completed:  · Living Situation  · CAGE  · Depression Screening  · Timed Get Up and Go  · Whisper Test  · Cognitive Function Screening  · Nutrition Screening  · ADL Screening  · PAQ Screening    Vitals:    06/07/18 0920   BP: 124/76   BP Location: Left arm   Patient Position: Sitting   BP Method: Large (Manual)   Pulse: 67   SpO2: 97%   Weight: 60.7 kg (133 lb 13.1 oz)   Height: 4' 10" (1.473 m)     Body mass index is 27.97 kg/m².  Physical Exam   Constitutional: She is oriented to person, place, and time.   Cardiovascular: Normal rate, regular rhythm and normal heart sounds.    Pulmonary/Chest: Effort normal and breath sounds normal.   Musculoskeletal: Normal range of motion. She exhibits edema (bilateral +1 pretibial edema ).   Neurological: She is alert and oriented to person, place, and time.   Skin: Skin is warm. Capillary refill takes less than 2 seconds.   Psychiatric: She has a normal mood and affect. Her behavior is normal. Thought content normal.   Vitals reviewed.        Diagnoses and health risks identified today and associated recommendations/orders:    1. Encounter for preventive health examination  Education provided about preventive health examinations and procedures; addressed and discussed patient's health concerns. Additionally, reviewed medical record for risk factors and documented the results during this encounter.    2. Combined hyperlipidemia associated with type 2 diabetes mellitus  Education provided about diabetes, management of blood glucose with diet and activities, " monitoring for worsening effects of diabetes.  Reviewed most recent Ha1c and informed patient of complications associated with uncontrolled diabetes.   - Hemoglobin A1c; Future    3. Diabetes mellitus with neurological manifestations, uncontrolled  Education provided about diabetes, management of blood glucose with diet and activities, monitoring for worsening effects of diabetes.  Reviewed most recent Ha1c and informed patient of complications associated with uncontrolled diabetes.     4. Diabetic polyneuropathy associated with type 2 diabetes mellitus  Education provided about diabetes, management of blood glucose with diet and activities, monitoring for worsening effects of diabetes.  Reviewed most recent Ha1c and informed patient of complications associated with uncontrolled diabetes.   Stable, patient evaluated/monitored by [facility]; continue as advised.     5. Encounter for long-term (current) use of insulin  We discussed medication, adverse effects, monitoring for efficacy.      6. Atherosclerosis of aorta  Stable, asymptomatic; monitor.     7. Follicular lymphoma of intrapelvic lymph nodes, unspecified follicular lymphoma type  Stable, patient evaluated/monitored by [facility] hematology/oncology dept; continue as advised.     8. Hemiparesis affecting left side as late effect of stroke  Stable, patient evaluated/monitored by [facility]; she has a rollator for ambulatory assistance. continue as advised.      9. Sacroiliitis  This is a chronic medical condition that is stable under the current regimen.    10. Lumbar facet arthropathy  This is a chronic medical condition that is stable under the current regimen.    11. Lentigo maligna in situ of nose  Stable, patient evaluated/monitored by [facility] hematology/oncology dept; continue as advised.     12. Debility  Stable, patient evaluated/monitored by [facility]; she has a rollator for ambulatory assistance.  continue as advised.     13. Need for vaccination  Patient aware of recommendation for tetanus vaccine, she declined at today's visit.     Reviewed health maintenance with patient, educated about recommended examinations, procedures (labs & images), and immunizations.     Provided Marina with a 5-10 year written screening schedule and personal prevention plan. Recommendations were developed using the USPSTF age appropriate recommendations. Education, counseling, and referrals were provided as needed. After Visit Summary printed and given to patient which includes a list of additional screenings\tests needed.    Follow-up in about 1 year (around 6/7/2019) for assessment .    Aguila Shaffer Jr, NP

## 2018-06-07 NOTE — PATIENT INSTRUCTIONS
Counseling and Referral of Other Preventative  (Italic type indicates deductible and co-insurance are waived)    Patient Name: Marina Trimble  Today's Date: 6/7/2018    Health Maintenance       Date Due Completion Date    TETANUS VACCINE 10/02/1953     Hemoglobin A1c 05/09/2018 11/9/2017, Patient aware of recommendation for hemoglobin A1c     Influenza Vaccine 08/01/2018 9/29/2017    Eye Exam 08/17/2018 8/17/2017    Override on 8/17/2017: Done    Lipid Panel 11/09/2018 11/9/2017    Foot Exam 12/21/2018 12/21/2017 (Done)    Override on 12/21/2017: Done    Override on 9/21/2017: Done    Override on 6/15/2017: Done    Override on 4/21/2015: Done (Dr. Woodruff)    DEXA SCAN 10/17/2019 10/17/2016        No orders of the defined types were placed in this encounter.    The following information is provided to all patients.  This information is to help you find resources for any of the problems found today that may be affecting your health:                Living healthy guide: www.[facility].[place].gov      Understanding Diabetes: www.diabetes.org      Eating healthy: www.cdc.gov/healthyweight      CDC home safety checklist: www.cdc.gov/steadi/patient.html      Agency on Aging: www.goea.[place].gov      Alcoholics anonymous (AA): www.aa.org      Physical Activity: www.kelin.nih.gov/dl0kmqt      Tobacco use: www.quitwithusla.org

## 2018-06-12 ENCOUNTER — HOSPITAL ENCOUNTER (OUTPATIENT)
Dept: RADIOLOGY | Facility: HOSPITAL | Age: 83
Discharge: HOME | End: 2018-06-12
Attending: INTERNAL MEDICINE
Payer: MEDICARE

## 2018-06-12 VITALS — BODY MASS INDEX: 27.92 KG/M2 | HEIGHT: 58 IN | WEIGHT: 133 LBS

## 2018-06-12 DIAGNOSIS — Z12.31 BREAST CANCER SCREENING BY MAMMOGRAM: ICD-10-CM

## 2018-06-12 PROCEDURE — 77067 SCR MAMMO BI INCL CAD: CPT | Mod: 26,,, | Performed by: RADIOLOGY

## 2018-06-12 PROCEDURE — 77063 BREAST TOMOSYNTHESIS BI: CPT | Mod: 26,,, | Performed by: RADIOLOGY

## 2018-06-12 PROCEDURE — 77067 SCR MAMMO BI INCL CAD: CPT | Mod: TC,PO

## 2018-06-13 ENCOUNTER — TELEPHONE (OUTPATIENT)
Dept: FAMILY MEDICINE | Facility: CLINIC | Age: 83
End: 2018-06-13

## 2018-06-13 NOTE — TELEPHONE ENCOUNTER
Patient had A1c next per call with throughout    Dr. MITCHELL says latest A1c really increased up to 8.0  His last appointment with Dr. MITCHELL was back in January sometime for an appointment in the near future.  Bring all your meds

## 2018-06-27 ENCOUNTER — OFFICE VISIT (OUTPATIENT)
Dept: FAMILY MEDICINE | Facility: CLINIC | Age: 83
End: 2018-06-27
Payer: MEDICARE

## 2018-06-27 VITALS
SYSTOLIC BLOOD PRESSURE: 122 MMHG | WEIGHT: 131.63 LBS | BODY MASS INDEX: 27.63 KG/M2 | HEIGHT: 58 IN | TEMPERATURE: 99 F | HEART RATE: 90 BPM | OXYGEN SATURATION: 97 % | DIASTOLIC BLOOD PRESSURE: 70 MMHG

## 2018-06-27 DIAGNOSIS — M54.50 SPINE PAIN, LUMBOSACRAL: ICD-10-CM

## 2018-06-27 DIAGNOSIS — E11.42 DIABETIC POLYNEUROPATHY ASSOCIATED WITH TYPE 2 DIABETES MELLITUS: ICD-10-CM

## 2018-06-27 DIAGNOSIS — I10 ESSENTIAL HYPERTENSION: ICD-10-CM

## 2018-06-27 DIAGNOSIS — F11.20 OPIATE DEPENDENCE, CONTINUOUS: ICD-10-CM

## 2018-06-27 PROCEDURE — 3074F SYST BP LT 130 MM HG: CPT | Mod: CPTII,S$GLB,, | Performed by: INTERNAL MEDICINE

## 2018-06-27 PROCEDURE — 99499 UNLISTED E&M SERVICE: CPT | Mod: S$GLB,,, | Performed by: INTERNAL MEDICINE

## 2018-06-27 PROCEDURE — 99999 PR PBB SHADOW E&M-EST. PATIENT-LVL III: CPT | Mod: PBBFAC,,, | Performed by: INTERNAL MEDICINE

## 2018-06-27 PROCEDURE — 3078F DIAST BP <80 MM HG: CPT | Mod: CPTII,S$GLB,, | Performed by: INTERNAL MEDICINE

## 2018-06-27 PROCEDURE — 99214 OFFICE O/P EST MOD 30 MIN: CPT | Mod: S$GLB,,, | Performed by: INTERNAL MEDICINE

## 2018-06-27 RX ORDER — ACETAMINOPHEN AND CODEINE PHOSPHATE 300; 60 MG/1; MG/1
1 TABLET ORAL EVERY 8 HOURS PRN
Qty: 60 TABLET | Refills: 3 | Status: SHIPPED | OUTPATIENT
Start: 2018-06-27 | End: 2018-07-12 | Stop reason: SDUPTHER

## 2018-06-27 NOTE — PROGRESS NOTES
Chief complaint:  Follow-up on diabetes    82-year-old white female with multiple medical problems .  Diabetes uncontrolled with A1c going from 6.9 and 8.0.  She is here with her cousin who is a diabetic.  Reviewed all the possible reasons for her A1c to have risen.  Really no change in diet and so forth but it turns out she is not taking the Levemir every night.  She varies the dose between 20 and 30 units.  Sometimes not taking it if her sugars are normal.  She does continue to take NovoLog during the daytime if her sugars are high.  We discussed and gave her written instruction to take 25 units every day and we will actually switch it to the morning so she will not be tempted to skip it if her sugars are normal in the evening.  She'll follow-up in 3 months and I sent a reminder since her A1c last visit was 6 months ago.  She does need a refill of her Tylenol No. 4.  She takes it to allow her to function.  She'll he takes it twice a day.  Her  was concerned about her getting hooked on it and I reassured her that her condition is expected to be ongoing and not resolve and so her knee for the pain medication is not expected to resolve in adult to be any addiction problems with the low potency medication that she is on with the minimal amount.  Counseled at length regarding all these issuesTotal time over 25 minutes with over 50% counseling.         ROS:   CONST: weight stable. EYES: no vision change. ENT: no sore throat. CV: no chest pain w/ exertion. RESP: no shortness of breath. GI: no nausea, vomiting, diarrhea. No dysphagia. : Some recent urinary frequency and occasional dysuria. MUSCULOSKELETAL: no new myalgias or arthralgias other than a week and a half of increase lower lumbar pain. SKIN: no new changes. NEURO: no focal deficits. PSYCH: no new issues. ENDOCRINE: no polyuria. HEME: no lymph nodes. ALLERGY: no general pruritis.      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 Shot monthly.  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  13) iron deficiency anemia. 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  Vertebral compression fracture treated with vertebral plasty   19.  Prevnar 13 2016  20.  Pelvic fracture December 2016  21.  GI bleed 2017, thickening of the distal esophagus on CT scan, EGD biopsy benign - duodenal stenosis not obstructing  22.  Eventual total Hyst    Allergies: Prednisone, hydrocodone, Lotensin, oxybutynin     Social history: The patient is a nonsmoker who is retired. The patient is . She rarely drank alcohol.     Family history: There is a significant family history of cardiac, pulmonary, and endocrine disease. There is no family history of head and neck malignancy.        Vital signs as above, Gen: no distress      Labs, prior cardiac testing and x-rays reviewed.  Her EKG today personally interpreted by me is normal sinus rhythm with no acute ST segment or T-wave changes    Marina was seen today for results.    Diagnoses and all orders for this visit:    Diabetes mellitus with neurological manifestations, uncontrolled, Levemir issues as above discussed and counseled    Diabetic polyneuropathy associated with  type 2 diabetes mellitus    Essential hypertension, chronic and stable on losartan    Spine pain, lumbosacral, ongoing source of pain, her use of medication is minimal, controlled some his management and monitoring and counseling done    Opiate dependence, continuous    Other orders  -     acetaminophen-codeine 300-60mg (TYLENOL #4) 300-60 mg Tab; Take 1 tablet by mouth every 8 (eight) hours as needed (back pain).

## 2018-07-11 DIAGNOSIS — E53.8 VITAMIN B12 DEFICIENCY: ICD-10-CM

## 2018-07-11 RX ORDER — CYANOCOBALAMIN 1000 UG/ML
1000 INJECTION, SOLUTION INTRAMUSCULAR; SUBCUTANEOUS
Qty: 6 ML | Refills: 11 | Status: SHIPPED | OUTPATIENT
Start: 2018-07-11

## 2018-07-12 ENCOUNTER — TELEPHONE (OUTPATIENT)
Dept: HEMATOLOGY/ONCOLOGY | Facility: CLINIC | Age: 83
End: 2018-07-12

## 2018-07-12 NOTE — TELEPHONE ENCOUNTER
----- Message from Loni Aponte sent at 7/12/2018  1:09 PM CDT -----  Contact: Pt  Pt calling stating that her injection medication still has not been sent to pharmacy. This is pt second time calling requesting a refill.        Pt call back number [phone]    Patient calling to get a refill for Tylenot #4. Patient instructed to call her PCP.

## 2018-07-13 RX ORDER — ACETAMINOPHEN AND CODEINE PHOSPHATE 300; 60 MG/1; MG/1
1 TABLET ORAL EVERY 8 HOURS PRN
Qty: 60 TABLET | Refills: 3 | Status: SHIPPED | OUTPATIENT
Start: 2018-07-13 | End: 2019-01-02

## 2018-08-02 ENCOUNTER — TELEPHONE (OUTPATIENT)
Dept: OPHTHALMOLOGY | Facility: CLINIC | Age: 83
End: 2018-08-02

## 2018-08-02 NOTE — TELEPHONE ENCOUNTER
Faxed ok for 3 refills w/90 day supply for restasis to [facility] pharmacy, [phone]

## 2018-08-30 ENCOUNTER — OFFICE VISIT (OUTPATIENT)
Dept: PODIATRY | Facility: CLINIC | Age: 83
End: 2018-08-30
Payer: MEDICARE

## 2018-08-30 VITALS — BODY MASS INDEX: 27.5 KG/M2 | WEIGHT: 131 LBS | HEIGHT: 58 IN

## 2018-08-30 DIAGNOSIS — L90.9 FAT PAD ATROPHY OF FOOT: ICD-10-CM

## 2018-08-30 DIAGNOSIS — D51.0 PERNICIOUS ANEMIA: ICD-10-CM

## 2018-08-30 DIAGNOSIS — E11.49 TYPE II DIABETES MELLITUS WITH NEUROLOGICAL MANIFESTATIONS: Primary | ICD-10-CM

## 2018-08-30 DIAGNOSIS — B35.1 NAIL DERMATOPHYTOSIS: ICD-10-CM

## 2018-08-30 PROCEDURE — 99499 UNLISTED E&M SERVICE: CPT | Mod: S$GLB,,, | Performed by: PODIATRIST

## 2018-08-30 PROCEDURE — 99999 PR PBB SHADOW E&M-EST. PATIENT-LVL III: CPT | Mod: PBBFAC,,, | Performed by: PODIATRIST

## 2018-08-30 PROCEDURE — 11721 DEBRIDE NAIL 6 OR MORE: CPT | Mod: Q9,S$GLB,, | Performed by: PODIATRIST

## 2018-08-30 NOTE — PROGRESS NOTES
Subjective:      Patient ID: Marina Trimble is a 82 y.o. female.    Chief Complaint: Diabetes Mellitus (Pcp Dr. Jimenez ); Diabetic Foot Exam; and Nail Care    Marina is a 82 y.o. female who presents to the clinic for evaluation and treatment of high risk feet. 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 The patient's chief complaint is long, thick toenails. This patient has documented high risk feet requiring routine maintenance secondary to diabetes mellitis and those secondary complications of diabetes, as mentioned. She has the one type of anemia; pernious 281.0 that still qualifies for nail care.    PCP: Jose Jimenez MD    Date Last Seen by PCP:   Chief Complaint   Patient presents with    Diabetes Mellitus     Pcp Dr. Jimenez     Diabetic Foot Exam    Nail Care       Current shoe gear:  SAS shoes    Hemoglobin A1C   Date Value Ref Range Status   06/12/2018 8.0 (H) 4.0 - 5.6 % Final     Comment:     ADA Screening Guidelines:  5.7-6.4%  Consistent with prediabetes  >or=6.5%  Consistent with diabetes  High levels of fetal hemoglobin interfere with the  HbA1C  assay. Heterozygous hemoglobin variants (HbS, HgC, etc)do  not significantly interfere with this assay.   However, presence of multiple variants may affect accuracy.     11/09/2017 6.9 (H) 4.0 - 5.6 % Final     Comment:     According to ADA guidelines, hemoglobin A1c <7.0% represents  optimal control in non-pregnant diabetic patients. Different  metrics may apply to specific patient populations.   Standards of Medical Care in Diabetes-2016.  For the purpose of screening for the presence of diabetes:  <5.7%     Consistent with the absence of diabetes  5.7-6.4%  Consistent with increasing risk for diabetes   (prediabetes)  >or=6.5%  Consistent with diabetes  Currently, no consensus exists for use of hemoglobin A1c  for diagnosis of diabetes for children.  This Hemoglobin A1c assay has significant interference with fetal   hemoglobin   (HbF). The results are invalid for patients with abnormal amounts of   HbF,   including those with known Hereditary Persistence   of Fetal Hemoglobin. Heterozygous hemoglobin variants (HbAS, HbAC,   HbAD, HbAE, HbA2) do not significantly interfere with this assay;   however, presence of multiple variants in a sample may impact the %   interference.     07/09/2017 8.1 (H) 4.0 - 5.6 % Final     Comment:     According to ADA guidelines, hemoglobin A1c <7.0% represents  optimal control in non-pregnant diabetic patients. Different  metrics may apply to specific patient populations.   Standards of Medical Care in Diabetes-2016.  For the purpose of screening for the presence of diabetes:  <5.7%     Consistent with the absence of diabetes  5.7-6.4%  Consistent with increasing risk for diabetes   (prediabetes)  >or=6.5%  Consistent with diabetes  Currently, no consensus exists for use of hemoglobin A1c  for diagnosis of diabetes for children.  This Hemoglobin A1c assay has significant interference with fetal   hemoglobin   (HbF). The results are invalid for patients with abnormal amounts  "of   HbF,   including those with known Hereditary Persistence   of Fetal Hemoglobin. Heterozygous hemoglobin variants (HbAS, HbAC,   HbAD, HbAE, HbA2) do not significantly interfere with this assay;   however, presence of multiple variants in a sample may impact the %   interference.       Current Outpatient Medications on File Prior to Visit   Medication Sig Dispense Refill    ACCU-CHEK FASTCLIX Misc USE TO CHECK BLOOD GLUCOSE THREE TIMES DAILY 1000 each 11    ACCU-CHEK SMARTVIEW TEST STRIP Strp USE TO CHECK BLOOD GLUCOSE 3 TIMES DAILY 300 strip 11    acetaminophen (TYLENOL) 500 MG tablet Take 1 tablet (500 mg total) by mouth every 4 (four) hours as needed for Pain.  0    acetaminophen-codeine 300-60mg (TYLENOL #4) 300-60 mg Tab Take 1 tablet by mouth every 8 (eight) hours as needed (back pain). 60 tablet 3    amitriptyline (ELAVIL) 10 MG tablet Take 10 mg by mouth once daily.       aspirin (ECOTRIN) 81 MG EC tablet Take 81 mg by mouth once daily.      atorvastatin (LIPITOR) 40 MG tablet TAKE 1 TABLET EVERY DAY 90 tablet 3    BD ULTRA-FINE BEVERLY PEN NEEDLE 32 gauge x 5/32" Ndle USE TWICE DAILY 500 each 12    blood-glucose meter Misc Use ad directed (Accu Check Beverly smartview meter) 1 each 4    clopidogrel (PLAVIX) 75 mg tablet TAKE 1 TABLET EVERY DAY 90 tablet 3    cyanocobalamin 1,000 mcg/mL injection Inject 1 mL (1,000 mcg total) into the muscle every 21 days. 6 mL 11    insulin aspart (NOVOLOG FLEXPEN) 100 unit/mL InPn pen Inject 4/6/6 units into skin with correction scale 150-200 +1, 201-250 +2, 251-300 +3, 301-350 +4, >350 +5. 6 mL 4    LEVEMIR FLEXTOUCH U-100 INSULN 100 unit/mL (3 mL) InPn pen INJECT  35 UNITS SUBCUTANEOUSLY EVERY EVENING 30 mL 4    losartan (COZAAR) 100 MG Tab TAKE 1 TABLET ONE TIME DAILY 90 tablet 90    multivitamin (CHEWABLE MULTI VITAMIN) Chew Take 2 tablets by mouth once daily.       naproxen (NAPROSYN) 500 MG tablet Take 1 tablet (500 mg total) by mouth 2 (two) times " "daily with meals. 11 tablet 0    nitroGLYCERIN (NITROSTAT) 0.4 MG SL tablet Place 1 tablet (0.4 mg total) under the tongue every 5 (five) minutes as needed for Chest pain. 30 tablet 12    NOVOFINE 32 32 gauge x 1/4" Ndle USE WITH LEVEMIR PEN EVERY EVENING 500 each 12    omeprazole (PRILOSEC) 40 MG capsule TAKE 1 CAPSULE EVERY MORNING 90 capsule 90    prochlorperazine (COMPAZINE) 10 MG tablet Take 1 tablet (10 mg total) by mouth every 6 (six) hours as needed. 30 tablet 3    PROCTOZONE-HC 2.5 % rectal cream Place rectally 2 (two) times daily. 90 g 12    triamcinolone acetonide 0.1% (KENALOG) 0.1 % cream       TRUEPLUS LANCETS 28 gauge Misc USE  TO CHECK BLOOD SUGAR THREE TIMES DAILY 300 each 0    UNABLE TO FIND Places uses Wellpatch daily      vitamin D 1000 units Tab Take 1 tablet (1,000 Units total) by mouth once daily.      VOLTAREN 1 % Gel       cycloSPORINE (RESTASIS) 0.05 % ophthalmic emulsion Place 0.4 mLs (1 drop total) into both eyes 2 (two) times daily. 180 vial 12     No current facility-administered medications on file prior to visit.      Review of patient's allergies indicates:   Allergen Reactions    Baclofen Nausea And Vomiting and Other (See Comments)     Dizziness    Benazepril      Other reaction(s): Rash,Swelling    Carisoprodol      Other reaction(s): Dysphoria    Desloratadine      Other reaction(s): Nausea  Other reaction(s): Nausea    Hydrocodone      Other reaction(s): disorientation    Methocarbamol      Other reaction(s): Nausea    Omeprazole Nausea And Vomiting    Oxybutynin      Other reaction(s): Hallucinations    Oxycodone Nausea And Vomiting    Tramadol      Other reaction(s): dizziness    Benazepril-hydrochlorothiazide Rash     Other reaction(s): Swelling    Metformin Diarrhea    Prednisone Rash     Past Surgical History:   Procedure Laterality Date    APPENDECTOMY      CATARACT EXTRACTION W/  INTRAOCULAR LENS IMPLANT Left 10/16/14    OS ()    " CHOLECYSTECTOMY      COLECTOMY      COLONOSCOPY      EYE SURGERY Bilateral     cataracts    eyelid surgery      HYSTERECTOMY      OOPHORECTOMY      PARTIAL GLOSSECTOMY  2010    SELECTIVE NECK DISSECTION  2010    tonsilectomy      TONSILLECTOMY       Family History   Problem Relation Age of Onset    Cataracts Mother     Macular degeneration Mother     Blindness Mother     No Known Problems Father     No Known Problems Son     No Known Problems Son     Coronary artery disease Unknown     COPD Unknown     No Known Problems Sister     No Known Problems Brother     No Known Problems Maternal Aunt     No Known Problems Maternal Uncle     No Known Problems Paternal Aunt     No Known Problems Paternal Uncle     No Known Problems Maternal Grandmother     No Known Problems Maternal Grandfather     No Known Problems Paternal Grandmother     No Known Problems Paternal Grandfather     Skin cancer Neg Hx     Melanoma Neg Hx     Amblyopia Neg Hx     Cancer Neg Hx     Diabetes Neg Hx     Glaucoma Neg Hx     Hypertension Neg Hx     Retinal detachment Neg Hx     Strabismus Neg Hx     Stroke Neg Hx     Thyroid disease Neg Hx     Colon cancer Neg Hx     Esophageal cancer Neg Hx      Social History     Socioeconomic History    Marital status:      Spouse name: Not on file    Number of children: Not on file    Years of education: Not on file    Highest education level: Not on file   Social Needs    Financial resource strain: Not on file    Food insecurity - worry: Not on file    Food insecurity - inability: Not on file    Transportation needs - medical: Not on file    Transportation needs - non-medical: Not on file   Occupational History    Not on file   Tobacco Use    Smoking status: Never Smoker    Smokeless tobacco: Never Used   Substance and Sexual Activity    Alcohol use: No     Alcohol/week: 0.0 oz    Drug use: No    Sexual activity: No   Other Topics Concern    Are  "you pregnant or think you may be? No    Breast-feeding No   Social History Narrative    She is . She [place] [facility] [place]     Review of Systems   Constitution: Negative for chills, fever and weakness.   Cardiovascular: Positive for leg swelling. Negative for claudication.   Respiratory: Positive for shortness of breath. Negative for cough.    Skin: Positive for dry skin and nail changes. Negative for itching and rash.   Musculoskeletal: Positive for arthritis, back pain, falls (december 2016, broke her hip), gout and joint pain. Negative for joint swelling and muscle weakness.   Gastrointestinal: Negative for diarrhea, nausea and vomiting.   Neurological: Positive for paresthesias. Negative for numbness and tremors.   Psychiatric/Behavioral: Negative for altered mental status and hallucinations.         Objective:       Vitals:    08/30/18 0954   Weight: 59.4 kg (131 lb)   Height: 4' 10" (1.473 m)   PainSc: 10-Worst pain ever   PainLoc: Back       Physical Exam   Constitutional:   General: Pt. is well-developed, well-nourished, appears stated age, in no acute distress, alert and oriented x 3. No evidence of depression, anxiety, or agitation. Calm, cooperative, and communicative. Appropriate interactions and affect.       Cardiovascular:   Pulses:       Dorsalis pedis pulses are 1+ on the right side, and 1+ on the left side.        Posterior tibial pulses are 1+ on the right side, and 1+ on the left side.   Dorsalis pedis and posterior tibial pulses are diminished Bilaterally. Toes are cool to touch. Feet are warm proximally.There is decreased digital hair. Skin is atrophic   Musculoskeletal:        Right ankle: She exhibits swelling. She exhibits normal range of motion. No tenderness. Achilles tendon exhibits no pain and no defect.        Left ankle: She exhibits swelling. She exhibits normal range of motion. No tenderness. Achilles tendon exhibits no pain and no defect.        Right foot: There is normal " range of motion and no tenderness.        Left foot: There is normal range of motion and no tenderness.   Decreased stride, station of gait.  apropulsive toe off.  Increased angle and base of gait.    Patient has hammertoes of digits 2-5 bilateral partially reducible without symptom today.    Visible and palpable bunion without pain at dorsomedial 1st metatarsal head right and left.  Hallux abducted right and left partially reducible, tracks laterally without being track bound.  No ecchymosis, erythema, edema, or cardinal signs infection or signs of trauma same foot.    Fat pad atrophy to heels and met heads bilateral       Neurological: No sensory deficit.   Covington-Rodolfo 5.07 monofilament is intact bilateral feet. Sharp/dull sensation is also intact Bilateral feet.       Skin: Skin is warm, dry and intact. No abrasion, no ecchymosis, no lesion and no rash noted. She is not diaphoretic. No cyanosis or erythema. No pallor. Nails show no clubbing.   Skin is atrophic, mild plantar rubor.  No pallor.      No pedal hair noted    Toenails 1-5 bilaterally are elongated by 2-3 mm, thickened by 2-3 mm, discolored/yellowed, dystrophic, brittle with subungual debris.     granuloma noted to lateral right hallux nail border;  Tender to palpation     Interdigital Spaces clean, dry and without evidence of break in skin integrity   Psychiatric: She has a normal mood and affect. Her speech is normal.   Nursing note and vitals reviewed.        Assessment:       Encounter Diagnoses   Name Primary?    Type II diabetes mellitus with neurological manifestations Yes    Pernicious anemia     Fat pad atrophy of foot     Nail dermatophytosis          Plan:       Marina was seen today for diabetes mellitus, diabetic foot exam and nail care.    Diagnoses and all orders for this visit:    Type II diabetes mellitus with neurological manifestations    Pernicious anemia    Fat pad atrophy of foot    Nail dermatophytosis      I counseled  the patient on her conditions, their implications and medical management.    - Shoe inspection. Diabetic Foot Education. Patient reminded of the importance of good nutrition and blood sugar control to help prevent podiatric complications of diabetes. Patient instructed on proper foot hygeine. We discussed wearing proper shoe gear, daily foot inspections, never walking without protective shoe gear, never putting sharp instruments to feet.     - With patient's permission, nails were aggressively reduced and debrided x 10 to their soft tissue attachment mechanically and with electric , removing all offending nail and debris. Patient relates relief following the procedure.  She will continue to monitor the areas daily, inspect her feet, wear protective shoe gear when ambulatory, moisturizer to maintain skin integrity and follow in this office in approximately 3-4 months, sooner p.r.n.

## 2018-08-31 ENCOUNTER — TELEPHONE (OUTPATIENT)
Dept: FAMILY MEDICINE | Facility: CLINIC | Age: 83
End: 2018-08-31

## 2018-08-31 NOTE — TELEPHONE ENCOUNTER
----- Message from Shana Sesay sent at 8/31/2018 12:10 PM CDT -----  Contact: self  Please call pt to discuss getting labs. Please link labs to 9/7/18.  Please call [phone].

## 2018-09-02 ENCOUNTER — HOSPITAL ENCOUNTER (EMERGENCY)
Facility: HOSPITAL | Age: 83
Discharge: HOME | End: 2018-09-02
Attending: INTERNAL MEDICINE
Payer: MEDICARE

## 2018-09-02 VITALS
RESPIRATION RATE: 16 BRPM | HEIGHT: 59 IN | DIASTOLIC BLOOD PRESSURE: 69 MMHG | TEMPERATURE: 98 F | HEART RATE: 91 BPM | WEIGHT: 158 LBS | BODY MASS INDEX: 31.85 KG/M2 | SYSTOLIC BLOOD PRESSURE: 150 MMHG | OXYGEN SATURATION: 96 %

## 2018-09-02 LAB — POCT GLUCOSE: 224 MG/DL (ref 70–110)

## 2018-09-02 PROCEDURE — 99283 EMERGENCY DEPT VISIT LOW MDM: CPT

## 2018-09-02 RX ORDER — INSULIN ASPART 100 [IU]/ML
INJECTION, SOLUTION INTRAVENOUS; SUBCUTANEOUS
Qty: 6 ML | Refills: 4 | Status: SHIPPED | OUTPATIENT
Start: 2018-09-02 | End: 2018-09-02 | Stop reason: SDUPTHER

## 2018-09-02 RX ORDER — INSULIN ASPART 100 [IU]/ML
INJECTION, SOLUTION INTRAVENOUS; SUBCUTANEOUS
Qty: 6 ML | Refills: 4 | Status: SHIPPED | OUTPATIENT
Start: 2018-09-02

## 2018-09-03 NOTE — ED PROVIDER NOTES
Encounter Date: 9/2/2018       History     Chief Complaint   Patient presents with    needs insulin     pt reports did not realized she was low on her insulin and here for rx refill      [age]-year-old female presents to the emergency department after running out of her NovoLog FlexPen insulin today.  She denies any complaints.      The history is provided by the patient. No  was used.     Review of patient's allergies indicates:   Allergen Reactions    Baclofen Nausea And Vomiting and Other (See Comments)     Dizziness    Benazepril      Other reaction(s): Rash,Swelling    Carisoprodol      Other reaction(s): Dysphoria    Desloratadine      Other reaction(s): Nausea  Other reaction(s): Nausea    Hydrocodone      Other reaction(s): disorientation    Methocarbamol      Other reaction(s): Nausea    Omeprazole Nausea And Vomiting    Oxybutynin      Other reaction(s): Hallucinations    Oxycodone Nausea And Vomiting    Tramadol      Other reaction(s): dizziness    Benazepril-hydrochlorothiazide Rash     Other reaction(s): Swelling    Metformin Diarrhea    Prednisone Rash     Past Medical History:   Diagnosis Date    Anemia     Aneurysm of aorta     Back pain     Cataract     Diabetes mellitus     sees Endocrine yearly ??   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    Melanoma 2011    melanoma in situ of R nasal tip, excised with FTSG and treated with Aldara    MGUS  (monoclonal gammopathy of unknown significance)     Dr Hodges    Osteopenia     Senile osteoporosis     Skin cancer     Skin neoplasm     Stroke     left weak -     Tongue cancer 2010    [id number]    Trouble in sleeping     Urinary incontinence     Vitamin B 12 deficiency     Vitamin B12 deficiency     Vitamin K deficiency      Past Surgical History:   Procedure Laterality Date    APPENDECTOMY      CATARACT EXTRACTION W/  INTRAOCULAR LENS IMPLANT Left 10/16/14    OS ()    CHOLECYSTECTOMY      COLECTOMY      COLONOSCOPY      EYE SURGERY Bilateral     cataracts    eyelid surgery      HYSTERECTOMY      OOPHORECTOMY      PARTIAL GLOSSECTOMY  2010    SELECTIVE NECK DISSECTION  2010    tonsilectomy      TONSILLECTOMY       Family History   Problem Relation Age of Onset    Cataracts Mother     Macular degeneration Mother     Blindness Mother     No Known Problems Father     No Known Problems Son     No Known Problems Son     Coronary artery disease Unknown     COPD Unknown     No Known Problems Sister     No Known Problems Brother     No Known Problems Maternal Aunt     No Known Problems Maternal Uncle     No Known Problems Paternal Aunt     No Known Problems Paternal Uncle     No Known Problems Maternal Grandmother     No Known Problems Maternal Grandfather     No Known Problems Paternal Grandmother     No Known Problems Paternal Grandfather     Skin cancer Neg Hx     Melanoma Neg Hx     Amblyopia Neg Hx     Cancer Neg Hx     Diabetes Neg Hx     Glaucoma Neg Hx     Hypertension Neg Hx     Retinal detachment Neg Hx     Strabismus Neg Hx     Stroke Neg Hx     Thyroid disease Neg Hx     Colon cancer Neg Hx     Esophageal cancer Neg Hx      Social History     Tobacco Use    Smoking status: Never Smoker    Smokeless tobacco: Never Used   Substance Use Topics    Alcohol use: No     Alcohol/week: 0.0 oz    Drug use: No     Review of Systems   All other systems  reviewed and are negative.      Physical Exam     Initial Vitals   BP Pulse Resp Temp SpO2   -- -- -- -- --      MAP       --         Physical Exam    Nursing note and vitals reviewed.  Constitutional: She appears well-developed and well-nourished.   HENT:   Head: Normocephalic and atraumatic.   Right Ear: External ear normal.   Left Ear: External ear normal.   Eyes: EOM are normal.   Neck: Normal range of motion.   Cardiovascular: Normal rate and regular rhythm.   Pulmonary/Chest: Breath sounds normal. No respiratory distress.   Abdominal: Soft. Bowel sounds are normal.   Musculoskeletal: Normal range of motion.   Neurological: She is alert and oriented to person, place, and time. She has normal strength.   Skin: Skin is warm.         ED Course   Procedures  Labs Reviewed   POCT GLUCOSE          Imaging Results    None          Medical Decision Making:   Initial Assessment:   [age]-year-old female presents to the emergency department after running out of her NovoLog FlexPen insulin today.  She denies any complaints.                        Clinical Impression:   The encounter diagnosis was Diabetes mellitus with neurological manifestations, uncontrolled.      Disposition:   Disposition: Discharged  Condition: Stable                        Carlos Alberto Hodges MD  09/02/18 [id number]

## 2018-09-03 NOTE — ED NOTES
Pt aaox3, rr even unlabored skin wdp cap refill <2 sec. Pt reports she did not realized she was low insulin pen until she went to take tonights dose, reports went to two pharmachy's enroute to facility to get filled and were closed. Pt reports cbg at home was 154. Denies any complaints. Pt informed of 24 hour pharmacy location at near by pharmacy. Denies any other needs or request. Pt here with sig other. Verbalizes and understands importance of insulin medication. Nad.

## 2018-09-04 DIAGNOSIS — I15.2 HYPERTENSION ASSOCIATED WITH DIABETES: ICD-10-CM

## 2018-09-04 DIAGNOSIS — E11.59 HYPERTENSION ASSOCIATED WITH DIABETES: ICD-10-CM

## 2018-09-06 RX ORDER — LOSARTAN POTASSIUM 100 MG/1
TABLET ORAL
Qty: 90 TABLET | Refills: 90 | Status: SHIPPED | OUTPATIENT
Start: 2018-09-06

## 2018-09-06 RX ORDER — LANCETS
EACH MISCELLANEOUS
Qty: 500 EACH | Refills: 11 | Status: SHIPPED | OUTPATIENT
Start: 2018-09-06 | End: 2018-10-30 | Stop reason: SDUPTHER

## 2018-09-06 RX ORDER — BLOOD SUGAR DIAGNOSTIC
STRIP MISCELLANEOUS
Qty: 300 STRIP | Refills: 11 | Status: SHIPPED | OUTPATIENT
Start: 2018-09-06

## 2018-09-07 ENCOUNTER — LAB VISIT (OUTPATIENT)
Dept: LAB | Facility: HOSPITAL | Age: 83
End: 2018-09-07
Attending: INTERNAL MEDICINE
Payer: MEDICARE

## 2018-09-07 DIAGNOSIS — D50.0 ANEMIA DUE TO CHRONIC BLOOD LOSS: ICD-10-CM

## 2018-09-07 DIAGNOSIS — D64.9 ANEMIA: ICD-10-CM

## 2018-09-07 LAB
ERYTHROCYTE [DISTWIDTH] IN BLOOD BY AUTOMATED COUNT: 12.8 %
ESTIMATED AVG GLUCOSE: 154 MG/DL
FERRITIN SERPL-MCNC: 257 NG/ML
HBA1C MFR BLD HPLC: 7 %
HCT VFR BLD AUTO: 39.8 %
HGB BLD-MCNC: 13.1 G/DL
MCH RBC QN AUTO: 30.7 PG
MCHC RBC AUTO-ENTMCNC: 32.9 G/DL
MCV RBC AUTO: 93 FL
NEUTROPHILS # BLD AUTO: 2.7 K/UL
PLATELET # BLD AUTO: 237 K/UL
PMV BLD AUTO: 10.7 FL
RBC # BLD AUTO: 4.27 M/UL
WBC # BLD AUTO: 5.49 K/UL

## 2018-09-07 PROCEDURE — 85027 COMPLETE CBC AUTOMATED: CPT

## 2018-09-07 PROCEDURE — 82728 ASSAY OF FERRITIN: CPT

## 2018-09-07 PROCEDURE — 36415 COLL VENOUS BLD VENIPUNCTURE: CPT | Mod: PO

## 2018-09-07 PROCEDURE — 83036 HEMOGLOBIN GLYCOSYLATED A1C: CPT

## 2018-09-20 RX ORDER — INSULIN ASPART 100 [IU]/ML
INJECTION, SOLUTION INTRAVENOUS; SUBCUTANEOUS
Qty: 100 ML | Refills: 12 | Status: SHIPPED | OUTPATIENT
Start: 2018-09-20 | End: 2018-10-04 | Stop reason: SDUPTHER

## 2018-09-20 RX ORDER — INSULIN ASPART 100 [IU]/ML
INJECTION, SOLUTION INTRAVENOUS; SUBCUTANEOUS
Qty: 100 ML | Refills: 12 | Status: SHIPPED | OUTPATIENT
Start: 2018-09-20 | End: 2018-09-20 | Stop reason: SDUPTHER

## 2018-10-01 ENCOUNTER — TELEPHONE (OUTPATIENT)
Dept: FAMILY MEDICINE | Facility: CLINIC | Age: 83
End: 2018-10-01

## 2018-10-04 ENCOUNTER — TELEPHONE (OUTPATIENT)
Dept: FAMILY MEDICINE | Facility: CLINIC | Age: 83
End: 2018-10-04

## 2018-10-04 NOTE — TELEPHONE ENCOUNTER
----- Message from Nanette Santacruz sent at 10/4/2018 10:38 AM CDT -----  Contact: Self   Patient says she need a refill on her insulin. Please call patient at [phone]      insulin aspart U-100 (NOVOLOG FLEXPEN U-100 INSULIN) 100 unit/mL InPn pen      [facility] - [place], [place] - [address]

## 2018-10-04 NOTE — TELEPHONE ENCOUNTER
----- Message from Angela Mcknight sent at 10/4/2018 12:59 PM CDT -----  Contact: self   Refill : insulin aspart U-100 (NOVOLOG FLEXPEN U-100 INSULIN) 100 unit/mL InPn pen    Pt requesting enough pills till her other refill get delivered to her home.         [facility] [id number] - [address] AT [facility] [address]  [address] [place] [id number]  Phone: [phone] Fax: [phone]

## 2018-10-04 NOTE — TELEPHONE ENCOUNTER
Information given to pharmacy again. Instructed that they must include sliding scale on medication.

## 2018-10-05 ENCOUNTER — CLINICAL SUPPORT (OUTPATIENT)
Dept: FAMILY MEDICINE | Facility: CLINIC | Age: 83
End: 2018-10-05
Payer: MEDICARE

## 2018-10-05 DIAGNOSIS — Z23 NEED FOR PROPHYLACTIC VACCINATION AND INOCULATION AGAINST INFLUENZA: Primary | ICD-10-CM

## 2018-10-05 PROCEDURE — 90662 IIV NO PRSV INCREASED AG IM: CPT | Mod: PBBFAC,PO | Performed by: INTERNAL MEDICINE

## 2018-10-05 PROCEDURE — 99499 UNLISTED E&M SERVICE: CPT | Mod: S$PBB,,, | Performed by: INTERNAL MEDICINE

## 2018-10-05 RX ORDER — INSULIN ASPART 100 [IU]/ML
INJECTION, SOLUTION INTRAVENOUS; SUBCUTANEOUS
Qty: 100 ML | Refills: 0 | Status: SHIPPED | OUTPATIENT
Start: 2018-10-05 | End: 2019-03-26 | Stop reason: SDUPTHER

## 2018-10-05 NOTE — TELEPHONE ENCOUNTER
Inject 4/6/6 units into skin with correction scale 150-200 +1, 201-250 +2, 251-300 +3, 301-350 +4, >350 +5.

## 2018-10-05 NOTE — TELEPHONE ENCOUNTER
----- Message from Nelda Olivas sent at 10/5/2018  2:48 PM CDT -----  Contact: [facility]/[phone]  Jojo called to clarify the direction for patient's prescription:  insulin aspart U-100 (NOVOLOG FLEXPEN U-100 INSULIN) 100 unit/mL InPn pen. Thank you.

## 2018-10-31 RX ORDER — LANCETS
EACH MISCELLANEOUS
Qty: 30 EACH | Refills: 11 | Status: SHIPPED | OUTPATIENT
Start: 2018-10-31

## 2018-11-06 RX ORDER — PEN NEEDLE, DIABETIC 32 GX 1/4"
NEEDLE, DISPOSABLE MISCELLANEOUS
Qty: 5000 EACH | Refills: 12 | Status: SHIPPED | OUTPATIENT
Start: 2018-11-06

## 2018-11-15 ENCOUNTER — OFFICE VISIT (OUTPATIENT)
Dept: FAMILY MEDICINE | Facility: CLINIC | Age: 83
End: 2018-11-15
Payer: MEDICARE

## 2018-11-15 VITALS
HEART RATE: 89 BPM | BODY MASS INDEX: 26.62 KG/M2 | DIASTOLIC BLOOD PRESSURE: 72 MMHG | WEIGHT: 132.06 LBS | HEIGHT: 59 IN | SYSTOLIC BLOOD PRESSURE: 150 MMHG | TEMPERATURE: 98 F | OXYGEN SATURATION: 99 %

## 2018-11-15 DIAGNOSIS — F11.20 OPIATE DEPENDENCE, CONTINUOUS: ICD-10-CM

## 2018-11-15 DIAGNOSIS — E53.8 B12 DEFICIENCY: ICD-10-CM

## 2018-11-15 DIAGNOSIS — M47.816 LUMBAR FACET ARTHROPATHY: ICD-10-CM

## 2018-11-15 DIAGNOSIS — E11.42 DIABETIC POLYNEUROPATHY ASSOCIATED WITH TYPE 2 DIABETES MELLITUS: ICD-10-CM

## 2018-11-15 DIAGNOSIS — I10 ESSENTIAL HYPERTENSION: ICD-10-CM

## 2018-11-15 PROCEDURE — 1101F PT FALLS ASSESS-DOCD LE1/YR: CPT | Mod: CPTII,HCNC,S$GLB, | Performed by: INTERNAL MEDICINE

## 2018-11-15 PROCEDURE — 3077F SYST BP >= 140 MM HG: CPT | Mod: CPTII,HCNC,S$GLB, | Performed by: INTERNAL MEDICINE

## 2018-11-15 PROCEDURE — 3078F DIAST BP <80 MM HG: CPT | Mod: CPTII,HCNC,S$GLB, | Performed by: INTERNAL MEDICINE

## 2018-11-15 PROCEDURE — 99214 OFFICE O/P EST MOD 30 MIN: CPT | Mod: HCNC,S$GLB,, | Performed by: INTERNAL MEDICINE

## 2018-11-15 PROCEDURE — 99999 PR PBB SHADOW E&M-EST. PATIENT-LVL III: CPT | Mod: PBBFAC,HCNC,, | Performed by: INTERNAL MEDICINE

## 2018-11-15 NOTE — PROGRESS NOTES
Chief complaint:  Follow-up on diabetes    83-year-old white female with multiple medical problems .  Diabetes uncontrolled with A1c going from 6.9 and 8.0 to 7.0 in Sept.  .  Reviewed all the possible reasons for her A1c to have risen.  Really no change in diet and so forth but it turns out she is not taking the Levemir every night.  She varies the dose between 20 and 30 units.  Sometimes not taking it if her sugars are normal.  She does continue to take NovoLog during the daytime if her sugars are high.  We discussed and gave her written instruction to take 25 units every day and we will actually switch it to the morning so she will not be tempted to skip it if her sugars are normal in the evening.  She'll follow-up in 3 months .      She does have a relative that she talks to on the phone who is a type 1 diabetic.  She did see a new medication on TV which is a weekly injection which we discussed probably would not be indicated her covered.    She questions about taking a B12 shot every 3 weeks.  I did review that back in 2007 she did have positive antibodies.  Her levels have been great.  We discussed possibly doing the B12 shot every 6 weeks and 3 months weak and she check her B12 A1c and so forth.  The B12 shots do hurt.  We also discussed insulin injections which sometimes tender and we discussed and reassured that she can use other sites on her abdomen.  Counseled at length regarding all these issuesTotal time over 25 minutes with over 50% counseling.         ROS:   CONST: weight stable. EYES: no vision change. ENT: no sore throat. CV: no chest pain w/ exertion. RESP: no shortness of breath. GI: no nausea, vomiting, diarrhea. No dysphagia. : Some recent urinary frequency and occasional dysuria. MUSCULOSKELETAL: no new myalgias or arthralgias other than a week and a half of increase lower lumbar pain. SKIN: no new changes. NEURO: no focal deficits. PSYCH: no new issues. ENDOCRINE: no polyuria. HEME: no lymph  nodes. ALLERGY: no general pruritis.      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 Shot monthly.  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  13) iron deficiency anemia. 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  Vertebral compression fracture treated with vertebral plasty   19.  Prevnar 13 2016  20.  Pelvic fracture December 2016  21.  GI bleed 2017, thickening of the distal esophagus on CT scan, EGD biopsy benign - duodenal stenosis not obstructing  22.  Eventual total Hyst    Allergies: Prednisone, hydrocodone, Lotensin, oxybutynin     Social history: The patient is a nonsmoker who is retired. The patient is . She rarely drank alcohol.     Family history: There is a significant family history of cardiac, pulmonary, and endocrine disease. There is no family history of head and neck malignancy.        Vital signs as above, Gen: no distress, abdomen appears normal with no signs of infection induration or bruising      Labs, prior cardiac testing and x-rays reviewed.  Her EKG today personally interpreted by me is normal sinus rhythm with no acute ST segment or T-wave changes    Marina was seen today for diabetes.    Diagnoses and  all orders for this visit:    Diabetes mellitus with neurological manifestations, uncontrolled, plan as above, also will try to get her a monitor to make glucose checks more easy  -     Hemoglobin A1c; Future  -     Lipid panel; Future    Diabetic polyneuropathy associated with type 2 diabetes mellitus    Essential hypertension ,chronic condition and stable.      Lumbar facet arthropathy, chronic pain stable    B12 deficiency, will have them do B12 shots every 6 weeks    Other orders  -     flash glucose scanning reader ([employer] KHOI 10 DAY READER) Misc; 1 Device by Misc.(Non-Drug; Combo Route) route 3 (three) times daily. Disp 1 sensor for every 10 days

## 2018-12-03 ENCOUNTER — OFFICE VISIT (OUTPATIENT)
Dept: PODIATRY | Facility: CLINIC | Age: 83
End: 2018-12-03
Payer: MEDICARE

## 2018-12-03 VITALS
HEIGHT: 59 IN | WEIGHT: 132 LBS | SYSTOLIC BLOOD PRESSURE: 120 MMHG | BODY MASS INDEX: 26.61 KG/M2 | DIASTOLIC BLOOD PRESSURE: 70 MMHG

## 2018-12-03 DIAGNOSIS — D51.0 PERNICIOUS ANEMIA: ICD-10-CM

## 2018-12-03 DIAGNOSIS — L90.9 FAT PAD ATROPHY OF FOOT: ICD-10-CM

## 2018-12-03 DIAGNOSIS — B35.1 NAIL DERMATOPHYTOSIS: ICD-10-CM

## 2018-12-03 DIAGNOSIS — E11.49 TYPE II DIABETES MELLITUS WITH NEUROLOGICAL MANIFESTATIONS: Primary | ICD-10-CM

## 2018-12-03 PROCEDURE — 99499 UNLISTED E&M SERVICE: CPT | Mod: HCNC,S$GLB,, | Performed by: PODIATRIST

## 2018-12-03 PROCEDURE — 99999 PR PBB SHADOW E&M-EST. PATIENT-LVL III: CPT | Mod: PBBFAC,HCNC,, | Performed by: PODIATRIST

## 2018-12-03 PROCEDURE — 11721 DEBRIDE NAIL 6 OR MORE: CPT | Mod: Q9,HCNC,S$GLB, | Performed by: PODIATRIST

## 2018-12-03 NOTE — PROGRESS NOTES
Subjective:      Patient ID: Marina Trimble is a 83 y.o. female.    Chief Complaint: Diabetic Foot Exam (PCP Dr. Jimenez 11/15/18); Diabetes Mellitus; and Nail Care    Marina is a 83 y.o. female who presents to the clinic for evaluation and treatment of high risk feet. 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 The patient's chief complaint is long, thick toenails. This patient has documented high risk feet requiring routine maintenance secondary to diabetes mellitis and those secondary complications of diabetes, as mentioned. She has the one type of anemia; pernious 281.0 that still qualifies for nail care.    PCP: Jose Jimenez MD    Date Last Seen by PCP:   Chief Complaint   Patient presents with    Diabetic Foot Exam     PCP Dr. Jimenez 11/15/18    Diabetes Mellitus    Nail Care       Current shoe gear:  SAS shoes, worn    Hemoglobin A1C   Date Value Ref Range Status   09/07/2018 7.0 (H) 4.0 - 5.6 % Final     Comment:     ADA Screening Guidelines:  5.7-6.4%  Consistent with prediabetes  >or=6.5%  Consistent with diabetes  High levels of fetal  hemoglobin interfere with the HbA1C  assay. Heterozygous hemoglobin variants (HbS, HgC, etc)do  not significantly interfere with this assay.   However, presence of multiple variants may affect accuracy.     06/12/2018 8.0 (H) 4.0 - 5.6 % Final     Comment:     ADA Screening Guidelines:  5.7-6.4%  Consistent with prediabetes  >or=6.5%  Consistent with diabetes  High levels of fetal hemoglobin interfere with the HbA1C  assay. Heterozygous hemoglobin variants (HbS, HgC, etc)do  not significantly interfere with this assay.   However, presence of multiple variants may affect accuracy.     11/09/2017 6.9 (H) 4.0 - 5.6 % Final     Comment:     According to ADA guidelines, hemoglobin A1c <7.0% represents  optimal control in non-pregnant diabetic patients. Different  metrics may apply to specific patient populations.   Standards of Medical Care in Diabetes-2016.  For the purpose of screening for the presence of diabetes:  <5.7%     Consistent with the absence of diabetes  5.7-6.4%  Consistent with increasing risk for diabetes   (prediabetes)  >or=6.5%  Consistent with diabetes  Currently, no consensus exists for use of hemoglobin A1c  for diagnosis of diabetes for children.  This Hemoglobin A1c assay has significant interference with fetal   hemoglobin   (HbF). The results are invalid for patients with abnormal amounts of   HbF,   including those with known Hereditary Persistence   of Fetal Hemoglobin. Heterozygous hemoglobin variants (HbAS, HbAC,   HbAD, HbAE, HbA2) do not significantly interfere with this assay;   however, presence of multiple variants in a sample may impact the %   interference.       Current Outpatient Medications on File Prior to Visit   Medication Sig Dispense Refill    ACCU-CHEK FASTCLIX LANCET DRUM Misc USE TO CHECK BLOOD GLUCOSE THREE TIMES DAILY 30 each 11    ACCU-CHEK SMARTVIEW TEST STRIP Strp CHECK BLOOD GLUCOSE THREE TIMES DAILY 300 strip 11    acetaminophen (TYLENOL) 500 MG tablet Take 1  "tablet (500 mg total) by mouth every 4 (four) hours as needed for Pain.  0    acetaminophen-codeine 300-60mg (TYLENOL #4) 300-60 mg Tab Take 1 tablet by mouth every 8 (eight) hours as needed (back pain). 60 tablet 3    amitriptyline (ELAVIL) 10 MG tablet Take 10 mg by mouth once daily.       aspirin (ECOTRIN) 81 MG EC tablet Take 81 mg by mouth once daily.      atorvastatin (LIPITOR) 40 MG tablet TAKE 1 TABLET EVERY DAY 90 tablet 3    BD ULTRA-FINE BEVERLY PEN NEEDLE 32 gauge x 5/32" Ndle USE TWICE DAILY 500 each 12    blood-glucose meter Misc Use ad directed (Accu Check Beverly smartview meter) 1 each 4    clopidogrel (PLAVIX) 75 mg tablet TAKE 1 TABLET EVERY DAY 90 tablet 3    cyanocobalamin 1,000 mcg/mL injection Inject 1 mL (1,000 mcg total) into the muscle every 21 days. 6 mL 11    flash glucose scanning reader (Neuronex KHOI 10 DAY READER) Misc 1 Device by Misc.(Non-Drug; Combo Route) route 3 (three) times daily. Disp 1 sensor for every 10 days 3 each 12    insulin aspart U-100 (NOVOLOG FLEXPEN U-100 INSULIN) 100 unit/mL InPn pen Inject 4/6/6 units into skin with correction scale 150-200 +1, 201-250 +2, 251-300 +3, 301-350 +4, >350 +5. 6 mL 4    insulin aspart U-100 (NOVOLOG FLEXPEN U-100 INSULIN) 100 unit/mL InPn pen Inject 4/6/6 units into skin with correction scale 150-200 +1, 201-250 +2, 251-300 +3, 301-350 +4, >350 +5. 100 mL 0    LEVEMIR FLEXTOUCH U-100 INSULN 100 unit/mL (3 mL) InPn pen INJECT  35 UNITS SUBCUTANEOUSLY EVERY EVENING 30 mL 4    losartan (COZAAR) 100 MG tablet TAKE 1 TABLET EVERY DAY 90 tablet 90    multivitamin (CHEWABLE MULTI VITAMIN) Chew Take 2 tablets by mouth once daily.       naproxen (NAPROSYN) 500 MG tablet Take 1 tablet (500 mg total) by mouth 2 (two) times daily with meals. 11 tablet 0    nitroGLYCERIN (NITROSTAT) 0.4 MG SL tablet Place 1 tablet (0.4 mg total) under the tongue every 5 (five) minutes as needed for Chest pain. 30 tablet 12    NOVOFINE 32 32 gauge x " "1/4" Ndle USE WITH LEVEMIR PEN EVERY EVENING 5000 each 12    omeprazole (PRILOSEC) 40 MG capsule TAKE 1 CAPSULE EVERY MORNING 90 capsule 90    prochlorperazine (COMPAZINE) 10 MG tablet Take 1 tablet (10 mg total) by mouth every 6 (six) hours as needed. 30 tablet 3    PROCTOZONE-HC 2.5 % rectal cream Place rectally 2 (two) times daily. 90 g 12    triamcinolone acetonide 0.1% (KENALOG) 0.1 % cream       TRUEPLUS LANCETS 28 gauge Misc USE  TO CHECK BLOOD SUGAR THREE TIMES DAILY 300 each 0    UNABLE TO FIND Places uses Wellpatch daily      vitamin D 1000 units Tab Take 1 tablet (1,000 Units total) by mouth once daily.      VOLTAREN 1 % Gel       cycloSPORINE (RESTASIS) 0.05 % ophthalmic emulsion Place 0.4 mLs (1 drop total) into both eyes 2 (two) times daily. 180 vial 12     No current facility-administered medications on file prior to visit.      Review of patient's allergies indicates:   Allergen Reactions    Baclofen Nausea And Vomiting and Other (See Comments)     Dizziness    Benazepril      Other reaction(s): Rash,Swelling    Carisoprodol      Other reaction(s): Dysphoria    Desloratadine      Other reaction(s): Nausea  Other reaction(s): Nausea    Hydrocodone      Other reaction(s): disorientation    Methocarbamol      Other reaction(s): Nausea    Omeprazole Nausea And Vomiting    Oxybutynin      Other reaction(s): Hallucinations    Oxycodone Nausea And Vomiting    Tramadol      Other reaction(s): dizziness    Benazepril-hydrochlorothiazide Rash     Other reaction(s): Swelling    Metformin Diarrhea    Prednisone Rash     Past Surgical History:   Procedure Laterality Date    APPENDECTOMY      BLOCK-FACET-LUMBAR Bilateral 4/7/2016    [facility] at [facility] PA[facility] MGT    BLOCK-NERVE-MEDIAL BRANCH-LUMBAR Bilateral 10/21/2015    Performed by Rhiannon Kirkpatrick MD at [facility] PAIN MGT    BLOCK-NERVE-MEDIAL BRANCH-LUMBAR Bilateral 10/20/2015    Performed by Rhiannon Kirkpatrick MD at [facility] " PAIN MGT    BLOCK-NERVE-MEDIAL BRANCH-LUMBAR Bilateral 7/13/2015    [facility] at [facility]    CATARACT EXTRACTION W/  INTRAOCULAR LENS IMPLANT Left 10/16/14    OS ()    CHOLECYSTECTOMY      COLECTOMY      COLONOSCOPY      COLONOSCOPY N/A 2/7/2013    Performed by [facility] Hall [facility] at [facility] ENDO (4TH FLR)    ESOPHAGOGASTRODUODENOSCOPY (EGD) N/A 10/10/2017    Performed by Terence Mccloud MD at [facility] ENDO (4TH FLR)    ESOPHAGOGASTRODUODENOSCOPY (EGD) N/A 7/11/2017    [facility] (2[facility]    EYE SURGERY Bilateral     cataracts    eyelid surgery      HYSTERECTOMY      INJECTION-JOINT Bilateral 9/16/2015    [facility] at [facility]    INSERTION-INTRAOCULAR LENS (IOL) Left 10/16/2014    Performed by Tez [facility]ginnis [facility] at [facility] OR 1ST FLR    OOPHORECTOMY      PARTIAL GLOSSECTOMY  2010    PHACOEMULSIFICATION-ASPIRATION-CATARACT Left 10/16/2014    Performed by [facility]innis [facility] at [facility] OR 1ST FLR    RADIOFREQUENCY THERMOCOAGULATION (RFTC)-NERVE-MEDIAN BRANCH-LUMBAR Right 2/18/2016    [facility] at [facility]    RADIOFREQUENCY THERMOCOAGULATION (RFTC)-NERVE-MEDIAN BRANCH-LUMBAR Left 2/4/2016    [facility] at [facility]    RADIOFREQUENCY THERMOCOAGULATION (RFTC)-NERVE-MEDIAN BRANCH-LUMBAR Bilateral 7/14/2015    Per[facility] by [facility]patrick [facility] at [facility]    SELECTIVE NECK DISSECTION  2010    tonsilectomy      TONSILLECTOMY       Family History   Problem Relation Age of Onset    Cataracts Mother     Macular degeneration Mother     Blindness Mother     No Known Problems Father     No Known Problems Son     No Known Problems Son     Coronary artery disease Unknown     COPD Unknown     No Known Problems Sister     No Known Problems Brother     No Known Problems Maternal Aunt     No Known Problems Maternal Uncle     No Known Problems  Paternal Aunt     No Known Problems Paternal Uncle     No Known Problems Maternal Grandmother     No Known Problems Maternal Grandfather     No Known Problems Paternal Grandmother     No Known Problems Paternal Grandfather     Skin cancer Neg Hx     Melanoma Neg Hx     Amblyopia Neg Hx     Cancer Neg Hx     Diabetes Neg Hx     Glaucoma Neg Hx     Hypertension Neg Hx     Retinal detachment Neg Hx     Strabismus Neg Hx     Stroke Neg Hx     Thyroid disease Neg Hx     Colon cancer Neg Hx     Esophageal cancer Neg Hx      Social History     Socioeconomic History    Marital status:      Spouse name: Not on file    Number of children: Not on file    Years of education: Not on file    Highest education level: Not on file   Social Needs    Financial resource strain: Not on file    Food insecurity - worry: Not on file    Food insecurity - inability: Not on file    Transportation needs - medical: Not on file    Transportation needs - non-medical: Not on file   Occupational History    Not on file   Tobacco Use    Smoking status: Never Smoker    Smokeless tobacco: Never Used   Substance and Sexual Activity    Alcohol use: No     Alcohol/week: 0.0 oz    Drug use: No    Sexual activity: No   Other Topics Concern    Are you pregnant or think you may be? No    Breast-feeding No   Social History Narrative    She is . She lives on the [facility].     Review of Systems   Constitution: Negative for chills, fever and weakness.   Cardiovascular: Positive for leg swelling. Negative for claudication.   Respiratory: Positive for shortness of breath. Negative for cough.    Skin: Positive for dry skin and nail changes. Negative for itching and rash.   Musculoskeletal: Positive for arthritis, back pain, falls (december 2016, broke her hip), gout and joint pain. Negative for joint swelling and muscle weakness.   Gastrointestinal: Negative for diarrhea, nausea and vomiting.   Neurological: Positive  "for paresthesias. Negative for numbness and tremors.   Psychiatric/Behavioral: Negative for altered mental status and hallucinations.         Objective:       Vitals:    12/03/18 1000   BP: 120/70   Weight: 59.9 kg (132 lb)   Height: 4' 11" (1.499 m)   PainSc: 0-No pain       Physical Exam   Constitutional:   General: Pt. is well-developed, well-nourished, appears stated age, in no acute distress, alert and oriented x 3. No evidence of depression, anxiety, or agitation. Calm, cooperative, and communicative. Appropriate interactions and affect.       Cardiovascular:   Pulses:       Dorsalis pedis pulses are 1+ on the right side, and 1+ on the left side.        Posterior tibial pulses are 1+ on the right side, and 1+ on the left side.   Dorsalis pedis and posterior tibial pulses are diminished Bilaterally. Toes are cool to touch. Feet are warm proximally.There is decreased digital hair. Skin is atrophic   Musculoskeletal:        Right ankle: She exhibits swelling. She exhibits normal range of motion. No tenderness. Achilles tendon exhibits no pain and no defect.        Left ankle: She exhibits swelling. She exhibits normal range of motion. No tenderness. Achilles tendon exhibits no pain and no defect.        Right foot: There is normal range of motion and no tenderness.        Left foot: There is normal range of motion and no tenderness.   Decreased stride, station of gait.  apropulsive toe off.  Increased angle and base of gait.    Patient has hammertoes of digits 2-5 bilateral partially reducible without symptom today.    Visible and palpable bunion without pain at dorsomedial 1st metatarsal head right and left.  Hallux abducted right and left partially reducible, tracks laterally without being track bound.  No ecchymosis, erythema, edema, or cardinal signs infection or signs of trauma same foot.    Fat pad atrophy to heels and met heads bilateral       Neurological: No sensory deficit.   Stonewall-Rodolfo 5.07 " monofilament is intact bilateral feet. Sharp/dull sensation is also intact Bilateral feet.       Skin: Skin is warm, dry and intact. No abrasion, no ecchymosis, no lesion and no rash noted. She is not diaphoretic. No cyanosis or erythema. No pallor. Nails show no clubbing.   Skin is atrophic, mild plantar rubor.  No pallor.      No pedal hair noted    Toenails 1-5 bilaterally are elongated by 2-3 mm, thickened by 2-3 mm, discolored/yellowed, dystrophic, brittle with subungual debris.     granuloma noted to lateral right hallux nail border;  Tender to palpation     Interdigital Spaces clean, dry and without evidence of break in skin integrity   Psychiatric: She has a normal mood and affect. Her speech is normal.   Nursing note and vitals reviewed.        Assessment:       Encounter Diagnoses   Name Primary?    Type II diabetes mellitus with neurological manifestations Yes    Pernicious anemia     Fat pad atrophy of foot     Nail dermatophytosis          Plan:       Marina was seen today for diabetic foot exam, diabetes mellitus and nail care.    Diagnoses and all orders for this visit:    Type II diabetes mellitus with neurological manifestations    Pernicious anemia    Fat pad atrophy of foot    Nail dermatophytosis      I counseled the patient on her conditions, their implications and medical management.    - Shoe inspection. Diabetic Foot Education. Patient reminded of the importance of good nutrition and blood sugar control to help prevent podiatric complications of diabetes. Patient instructed on proper foot hygeine. We discussed wearing proper shoe gear, daily foot inspections, never walking without protective shoe gear, never putting sharp instruments to feet.     - With patient's permission, nails were aggressively reduced and debrided x 10 to their soft tissue attachment mechanically and with electric , removing all offending nail and debris. Patient relates relief following the procedure.  She  will continue to monitor the areas daily, inspect her feet, wear protective shoe gear when ambulatory, moisturizer to maintain skin integrity and follow in this office in approximately 3-4 months, sooner p.r.n.

## 2018-12-04 ENCOUNTER — TELEPHONE (OUTPATIENT)
Dept: HEMATOLOGY/ONCOLOGY | Facility: CLINIC | Age: 83
End: 2018-12-04

## 2018-12-04 NOTE — TELEPHONE ENCOUNTER
----- Message from Yaima Pratt sent at 12/4/2018 11:59 AM CST -----  Contact: Pt  Pt called to speak with nurse Shana have some questions about Medication  Callback#[phone]  Thank You  RAMOS Pratt    Called patient to her home and cell phone. No answered.

## 2018-12-18 ENCOUNTER — TELEPHONE (OUTPATIENT)
Dept: FAMILY MEDICINE | Facility: CLINIC | Age: 83
End: 2018-12-18

## 2018-12-18 DIAGNOSIS — Z79.899 POLYPHARMACY: ICD-10-CM

## 2018-12-18 DIAGNOSIS — I10 ESSENTIAL HYPERTENSION: ICD-10-CM

## 2018-12-18 DIAGNOSIS — Z91.89 AT RISK FOR POLYPHARMACY: ICD-10-CM

## 2018-12-18 NOTE — TELEPHONE ENCOUNTER
More argumantative w  and  concerned she may not be managing her own meds properly. She will not allow him to help. May need home eval, assess if she would allow HH to check meds etc

## 2018-12-20 ENCOUNTER — OUTPATIENT CASE MANAGEMENT (OUTPATIENT)
Dept: ADMINISTRATIVE | Facility: OTHER | Age: 83
End: 2018-12-20

## 2018-12-20 ENCOUNTER — TELEPHONE (OUTPATIENT)
Dept: FAMILY MEDICINE | Facility: CLINIC | Age: 83
End: 2018-12-20

## 2018-12-20 NOTE — TELEPHONE ENCOUNTER
----- Message from Gabby Lane sent at 12/20/2018 11:00 AM CST -----  Thank you for the referral.  Patient has been assigned to Lottie Mack LMSW for low risk screening for Outpatient Case Management.      Reason for referral: Diabetes mellitus with neurological manifestations, uncontrolled  Essential hypertension  At risk for polypharmacy     Please contact hospitals at [phone] with any questions.     Thank you,     Gabby Lane, [facility]  Outpatient Care Mgmt.  [phone]

## 2018-12-20 NOTE — PROGRESS NOTES
Thank you for the referral.  Patient has been assigned to Lottie Mack LMSW for low risk screening for Outpatient Case Management.     Reason for referral: Diabetes mellitus with neurological manifestations, uncontrolled  Essential hypertension  At risk for polypharmacy    Please contact [facility] at ext. [phone] with any questions.    Thank you,    Gabby Lane, [facility] [place]  Outpatient Care Mgmt.  [phone]

## 2018-12-24 ENCOUNTER — OUTPATIENT CASE MANAGEMENT (OUTPATIENT)
Dept: ADMINISTRATIVE | Facility: OTHER | Age: 83
End: 2018-12-24

## 2018-12-24 NOTE — PROGRESS NOTES
Attempt #:  1  This LMSW attempted to reach patient to provide resource. Patient reports its not a great time. Patient ask that this LMSW contact her later.

## 2018-12-27 ENCOUNTER — OUTPATIENT CASE MANAGEMENT (OUTPATIENT)
Dept: ADMINISTRATIVE | Facility: OTHER | Age: 83
End: 2018-12-27

## 2018-12-27 NOTE — PROGRESS NOTES
2nd attempt     This LMSW attempted to reach patient/caregiver Smith   to provide resource. Smith reports he is hard of hearing and reports it may be better to complete this assessment  In person. LMSW will advised PCP of caregiver request and will ask PCP to please access for HH evaluation as patient is low risk and a home visit will not be avilable at this time.

## 2018-12-28 ENCOUNTER — TELEPHONE (OUTPATIENT)
Dept: FAMILY MEDICINE | Facility: CLINIC | Age: 83
End: 2018-12-28

## 2018-12-28 DIAGNOSIS — Z86.73 HISTORY OF CVA (CEREBROVASCULAR ACCIDENT): Primary | ICD-10-CM

## 2018-12-28 DIAGNOSIS — E11.69 COMBINED HYPERLIPIDEMIA ASSOCIATED WITH TYPE 2 DIABETES MELLITUS: ICD-10-CM

## 2018-12-28 DIAGNOSIS — I10 ESSENTIAL HYPERTENSION: ICD-10-CM

## 2018-12-28 DIAGNOSIS — R53.81 DEBILITY: ICD-10-CM

## 2018-12-28 DIAGNOSIS — E78.2 COMBINED HYPERLIPIDEMIA ASSOCIATED WITH TYPE 2 DIABETES MELLITUS: ICD-10-CM

## 2018-12-28 DIAGNOSIS — I69.354 HEMIPARESIS AFFECTING LEFT SIDE AS LATE EFFECT OF STROKE: ICD-10-CM

## 2018-12-28 NOTE — TELEPHONE ENCOUNTER
----- Message from Lottie Mack LMSW sent at 12/27/2018  3:39 PM CST -----  This SW received a referral on the above patient. YARITZA spoke with patient spouse Smith. Smith reports he is hard of hearing and reports it may be better to complete this assessment  In person. Dr. Jimenez Please evaluate for possible HH evaluation. as patient is  low risk and a home visit will not be avilable at this time.    Thank you for the referral,    Lottie Mack LMSW

## 2019-01-02 ENCOUNTER — OFFICE VISIT (OUTPATIENT)
Dept: FAMILY MEDICINE | Facility: CLINIC | Age: 84
End: 2019-01-02
Payer: MEDICARE

## 2019-01-02 VITALS
WEIGHT: 132 LBS | HEIGHT: 59 IN | BODY MASS INDEX: 26.61 KG/M2 | DIASTOLIC BLOOD PRESSURE: 74 MMHG | SYSTOLIC BLOOD PRESSURE: 128 MMHG | HEART RATE: 89 BPM | OXYGEN SATURATION: 95 % | TEMPERATURE: 99 F

## 2019-01-02 DIAGNOSIS — K59.1 FUNCTIONAL DIARRHEA: Primary | ICD-10-CM

## 2019-01-02 DIAGNOSIS — I10 ESSENTIAL HYPERTENSION: ICD-10-CM

## 2019-01-02 DIAGNOSIS — I70.0 ATHEROSCLEROSIS OF AORTA: ICD-10-CM

## 2019-01-02 DIAGNOSIS — E78.5 HYPERLIPIDEMIA, UNSPECIFIED HYPERLIPIDEMIA TYPE: ICD-10-CM

## 2019-01-02 DIAGNOSIS — K31.5 DUODENAL STENOSIS: ICD-10-CM

## 2019-01-02 PROCEDURE — 99214 OFFICE O/P EST MOD 30 MIN: CPT | Mod: HCNC,S$GLB,, | Performed by: NURSE PRACTITIONER

## 2019-01-02 PROCEDURE — 1101F PR PT FALLS ASSESS DOC 0-1 FALLS W/OUT INJ PAST YR: ICD-10-PCS | Mod: CPTII,HCNC,S$GLB, | Performed by: NURSE PRACTITIONER

## 2019-01-02 PROCEDURE — 3078F DIAST BP <80 MM HG: CPT | Mod: CPTII,HCNC,S$GLB, | Performed by: NURSE PRACTITIONER

## 2019-01-02 PROCEDURE — 99214 PR OFFICE/OUTPT VISIT, EST, LEVL IV, 30-39 MIN: ICD-10-PCS | Mod: HCNC,S$GLB,, | Performed by: NURSE PRACTITIONER

## 2019-01-02 PROCEDURE — 99999 PR PBB SHADOW E&M-EST. PATIENT-LVL V: ICD-10-PCS | Mod: PBBFAC,HCNC,, | Performed by: NURSE PRACTITIONER

## 2019-01-02 PROCEDURE — 3078F PR MOST RECENT DIASTOLIC BLOOD PRESSURE < 80 MM HG: ICD-10-PCS | Mod: CPTII,HCNC,S$GLB, | Performed by: NURSE PRACTITIONER

## 2019-01-02 PROCEDURE — 3074F SYST BP LT 130 MM HG: CPT | Mod: CPTII,HCNC,S$GLB, | Performed by: NURSE PRACTITIONER

## 2019-01-02 PROCEDURE — 3074F PR MOST RECENT SYSTOLIC BLOOD PRESSURE < 130 MM HG: ICD-10-PCS | Mod: CPTII,HCNC,S$GLB, | Performed by: NURSE PRACTITIONER

## 2019-01-02 PROCEDURE — 1101F PT FALLS ASSESS-DOCD LE1/YR: CPT | Mod: CPTII,HCNC,S$GLB, | Performed by: NURSE PRACTITIONER

## 2019-01-02 PROCEDURE — 99999 PR PBB SHADOW E&M-EST. PATIENT-LVL V: CPT | Mod: PBBFAC,HCNC,, | Performed by: NURSE PRACTITIONER

## 2019-01-02 RX ORDER — DICYCLOMINE HYDROCHLORIDE 10 MG/1
10 CAPSULE ORAL 3 TIMES DAILY
Qty: 30 CAPSULE | Refills: 2 | Status: SHIPPED | OUTPATIENT
Start: 2019-01-02 | End: 2019-01-12

## 2019-01-03 NOTE — PROGRESS NOTES
History of Present Illness   Marina Trimble is a 83 y.o. woman with medical history as listed below who presents today for evaluation of diarrhea. She reports most recent flare began about five days ago. Diarrhea is intermittent and occurs roughly every other day. She reports about three episodes per day when symptoms do occur. There is associated cramping just prior to bowel movement. She states her stool is loose and sometimes there is bright red blood on the toilet tissue- she believes this may be coming from her hemorrhoids. She denies watery stool or black tarry stool. She has no persistent abdominal pain, nausea, vomiting, fevers, or change in appetite. She reports that has been a recurring problem for years. She is taking Imodium which is sometimes effective in minimizing her symptoms. She has no additional complaints and is otherwise healthy on today's visit.      Past Medical History:   Diagnosis Date    Anemia     Aneurysm of aorta     Back pain     Cataract     Diabetes mellitus     sees Endocrine yearly ??   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    Melanoma 2011    melanoma in situ of R nasal tip, excised with FTSG and treated with Aldara    MGUS (monoclonal gammopathy of unknown significance)     Dr Hodges    Osteopenia     Senile osteoporosis     Skin cancer     Skin neoplasm     Stroke     left weak -     Tongue cancer 2010    [id number]    Trouble in sleeping     Urinary  incontinence     Vitamin B 12 deficiency     Vitamin B12 deficiency     Vitamin K deficiency        Past Surgical History:   Procedure Laterality Date    APPENDECTOMY      BLOCK-FACET-LUMBAR Bilateral 4/7/2016    Performed [facility] at [facility]    BLOCK-NERVE-MEDIAL BRANCH-LUMBAR Bilateral 10/21/2015    Performed by Rhiannon Kirkpatrick [facility] at [facility]    BLOCK-NERVE-MEDIAL BRANCH-LUMBAR Bilateral 10/20/2015    Performed by [facility] at [facility]    BLOCK-NERVE-MEDIAL BRANCH-LUMBAR Bilateral 7/13/2015    Performed by [facility]patrick [facility] at [facility]    CATARACT EXTRACTION W/  INTRAOCULAR LENS IMPLANT Left 10/16/14    OS ()    CHOLECYSTECTOMY      COLECTOMY      COLONOSCOPY      COLONOSCOPY N/A 2/7/2013    Performed by Johanna Hall [facility] at [facility] (4TH FLR)    ESOPHAGOGASTRODUODENOSCOPY (EGD) N/A 10/10/2017    Performed by Te[facility] Mccloud [facility] at [facility] ENDO (4TH FLR)    ESOPHAGOGASTRODUODENOSCOPY (EGD) N/A 7/11/2017    Performed by Claus Ram MD at [facility] (2ND FLR)    EYE SURGERY Bilateral     cataracts    eyelid surgery      HYSTERECTOMY      INJECTION-JOINT Bilateral 9/16/2015    Performed by [facility]patrick [facility] at [facility]    INSERTION-INTRAOCULAR LENS (IOL) Left 10/16/2014    Performed by Tez [facility]ginnis [facility] at [facility] OR 1ST FLR    OOPHORECTOMY      PARTIAL GLOSSECTOMY  2010    PHACOEMULSIFICATION-ASPIRATION-CATARACT Left 10/16/2014    Performed by [facility]innis [facility] at [facility] OR 1ST FLR    RADIOFREQUENCY THERMOCOAGULATION (RFTC)-NERVE-MEDIAN BRANCH-LUMBAR Right 2/18/2016    Performed by Rud[facility]hena [facility] at [facility]    RADIOFREQUENCY THERMOCOAGULATION (RFTC)-NERVE-MEDIAN BRANCH-LUMBAR Left 2/4/2016    Performed by Rud[facility]a [facility] at [facility]    RADIOFREQUENCY THERMOCOAGULATION (RFTC)-NERVE-MEDIAN BRANCH-LUMBAR Bilateral 7/14/2015    Performed by [facility]non Kirkpatrick [facility] at [facility]    SELECTIVE NECK  DISSECTION  2010    tonsilectomy      TONSILLECTOMY         Social History     Socioeconomic History    Marital status:      Spouse name: None    Number of children: None    Years of education: None    Highest education level: None   Social Needs    Financial resource strain: None    Food insecurity - worry: None    Food insecurity - inability: None    Transportation needs - medical: None    Transportation needs - non-medical: None   Occupational History    None   Tobacco Use    Smoking status: Never Smoker    Smokeless tobacco: Never Used   Substance and Sexual Activity    Alcohol use: No     Alcohol/week: 0.0 oz    Drug use: No    Sexual activity: No   Other Topics Concern    Are you pregnant or think you may be? No    Breast-feeding No   Social History Narrative    She is . She lives on the [facility].       Family History   Problem Relation Age of Onset    Cataracts Mother     Macular degeneration Mother     Blindness Mother     No Known Problems Father     No Known Problems Son     No Known Problems Son     Coronary artery disease Unknown     COPD Unknown     No Known Problems Sister     No Known Problems Brother     No Known Problems Maternal Aunt     No Known Problems Maternal Uncle     No Known Problems Paternal Aunt     No Known Problems Paternal Uncle     No Known Problems Maternal Grandmother     No Known Problems Maternal Grandfather     No Known Problems Paternal Grandmother     No Known Problems Paternal Grandfather     Skin cancer Neg Hx     Melanoma Neg Hx     Amblyopia Neg Hx     Cancer Neg Hx     Diabetes Neg Hx     Glaucoma Neg Hx     Hypertension Neg Hx     Retinal detachment Neg Hx     Strabismus Neg Hx     Stroke Neg Hx     Thyroid disease Neg Hx     Colon cancer Neg Hx     Esophageal cancer Neg Hx        Review of Systems  Review of Systems   Constitutional: Negative for chills and fever.   Gastrointestinal: Positive for abdominal  "pain (cramping) and diarrhea. Negative for blood in stool, constipation, nausea and vomiting.   Genitourinary: Negative for flank pain and hematuria.     A complete review of systems was otherwise negative.    Physical Exam  /74 (BP Location: Right arm, Patient Position: Sitting, BP Method: Medium (Manual))   Pulse 89   Temp 98.9 °F (37.2 °C) (Oral)   Ht 4' 11" (1.499 m)   Wt 59.9 kg (132 lb)   SpO2 95%   BMI 26.66 kg/m²   General appearance: alert, appears stated age, cooperative and no distress  Back: symmetric, no curvature. ROM normal. No CVA tenderness.  Lungs: clear to auscultation bilaterally  Heart: regular rate and rhythm, S1, S2 normal, no murmur, click, rub or gallop  Abdomen: soft, non-tender; bowel sounds normal; no masses,  no organomegaly and no rebound tenderness, guarding, or rigidty  Extremities: extremities normal, atraumatic, no cyanosis or edema  Pulses: 2+ and symmetric  Skin: Skin color, texture, turgor normal. No rashes or lesions  Neurologic: Grossly normal    Assessment/Plan  Functional diarrhea  Likely functional, we will obtain stool studies to rule out infectious etiology. No red flags on exam to suggest acute abdomen. Bentyl TID to help with cramping. Recommend bland diet and advance as tolerated- discussed increasing fiber with adequate fluid intake. ER precautions discussed. If no improvement, proceed with imaging.  -     dicyclomine (BENTYL) 10 MG capsule; Take 1 capsule (10 mg total) by mouth 3 (three) times daily. for 10 days  Dispense: 30 capsule; Refill: 2  -     Stool culture; Future; Expected date: 01/02/2019  -     Stool Exam-Ova,Cysts,Parasites; Future; Expected date: 01/02/2019  -     Clostridium difficile EIA; Future; Expected date: 01/02/2019    Duodenal stenosis  The current medical regimen is effective;  continue present plan and medications.  As above.    Essential hypertension  The current medical regimen is effective;  continue present plan and " medications.    Hyperlipidemia, unspecified hyperlipidemia type  The current medical regimen is effective;  continue present plan and medications.    Atherosclerosis of aorta  The current medical regimen is effective;  continue present plan and medications.    Diabetes mellitus with neurological manifestations, uncontrolled  The current medical regimen is effective;  continue present plan and medications.    Patient has verbalized understanding and is in agreement with plan of care.    Follow-up if symptoms worsen or fail to improve.

## 2019-01-03 NOTE — PATIENT INSTRUCTIONS

## 2019-01-05 ENCOUNTER — LAB VISIT (OUTPATIENT)
Dept: LAB | Facility: HOSPITAL | Age: 84
End: 2019-01-05
Attending: NURSE PRACTITIONER
Payer: MEDICARE

## 2019-01-05 DIAGNOSIS — K59.1 FUNCTIONAL DIARRHEA: ICD-10-CM

## 2019-01-05 LAB
C DIFF GDH STL QL: NEGATIVE
C DIFF TOX A+B STL QL IA: NEGATIVE

## 2019-01-05 PROCEDURE — 87046 STOOL CULTR AEROBIC BACT EA: CPT | Mod: HCNC

## 2019-01-05 PROCEDURE — 87209 SMEAR COMPLEX STAIN: CPT | Mod: HCNC

## 2019-01-05 PROCEDURE — 87427 SHIGA-LIKE TOXIN AG IA: CPT | Mod: HCNC

## 2019-01-05 PROCEDURE — 87449 NOS EACH ORGANISM AG IA: CPT | Mod: HCNC

## 2019-01-05 PROCEDURE — 87045 FECES CULTURE AEROBIC BACT: CPT | Mod: HCNC

## 2019-01-06 LAB
E COLI SXT1 STL QL IA: NEGATIVE
E COLI SXT2 STL QL IA: NEGATIVE

## 2019-01-07 LAB — O+P STL TRI STN: NORMAL

## 2019-01-08 ENCOUNTER — TELEPHONE (OUTPATIENT)
Dept: FAMILY MEDICINE | Facility: CLINIC | Age: 84
End: 2019-01-08

## 2019-01-08 ENCOUNTER — TELEPHONE (OUTPATIENT)
Dept: HEMATOLOGY/ONCOLOGY | Facility: CLINIC | Age: 84
End: 2019-01-08

## 2019-01-08 LAB — BACTERIA STL CULT: NORMAL

## 2019-01-08 NOTE — TELEPHONE ENCOUNTER
----- Message from Yaimanilson Pratt sent at 1/8/2019 10:03 AM CST -----  Pt called to speak with nurse Shana about some medication the she needs to have refilled states it a shot and she is not sure have some questions   Callback#[phone]  Thank You  RAMOS Terence    Patient would like a refill of her vitamin b12. Patient has 11 refills at [facility]. She will call her pharmacy to refill her prescription.

## 2019-01-08 NOTE — TELEPHONE ENCOUNTER
Attempted to call both house and cell number.    House number gave me an busy sign.    Cell number is no longer an working number.

## 2019-01-23 ENCOUNTER — LAB VISIT (OUTPATIENT)
Dept: LAB | Facility: HOSPITAL | Age: 84
End: 2019-01-23
Attending: PHYSICIAN ASSISTANT
Payer: MEDICARE

## 2019-01-23 ENCOUNTER — OFFICE VISIT (OUTPATIENT)
Dept: FAMILY MEDICINE | Facility: CLINIC | Age: 84
End: 2019-01-23
Payer: MEDICARE

## 2019-01-23 VITALS
WEIGHT: 132 LBS | SYSTOLIC BLOOD PRESSURE: 134 MMHG | DIASTOLIC BLOOD PRESSURE: 78 MMHG | HEART RATE: 71 BPM | OXYGEN SATURATION: 99 % | BODY MASS INDEX: 26.61 KG/M2 | HEIGHT: 59 IN | TEMPERATURE: 99 F

## 2019-01-23 DIAGNOSIS — R41.3 MEMORY CHANGES: Primary | ICD-10-CM

## 2019-01-23 DIAGNOSIS — R41.3 MEMORY CHANGES: ICD-10-CM

## 2019-01-23 DIAGNOSIS — R73.9 HYPERGLYCEMIA: ICD-10-CM

## 2019-01-23 DIAGNOSIS — I70.0 ATHEROSCLEROSIS OF AORTA: ICD-10-CM

## 2019-01-23 DIAGNOSIS — I10 ESSENTIAL HYPERTENSION: ICD-10-CM

## 2019-01-23 DIAGNOSIS — E53.8 VITAMIN B12 DEFICIENCY: ICD-10-CM

## 2019-01-23 LAB
ALBUMIN SERPL BCP-MCNC: 3.6 G/DL
ALP SERPL-CCNC: 93 U/L
ALT SERPL W/O P-5'-P-CCNC: 15 U/L
ANION GAP SERPL CALC-SCNC: 10 MMOL/L
AST SERPL-CCNC: 22 U/L
BASOPHILS # BLD AUTO: 0.06 K/UL
BASOPHILS NFR BLD: 1 %
BILIRUB SERPL-MCNC: 0.4 MG/DL
BILIRUB SERPL-MCNC: ABNORMAL MG/DL
BLOOD URINE, POC: ABNORMAL
BUN SERPL-MCNC: 11 MG/DL
CALCIUM SERPL-MCNC: 9.3 MG/DL
CHLORIDE SERPL-SCNC: 101 MMOL/L
CO2 SERPL-SCNC: 27 MMOL/L
COLOR, POC UA: YELLOW
CREAT SERPL-MCNC: 0.8 MG/DL
DIFFERENTIAL METHOD: NORMAL
EOSINOPHIL # BLD AUTO: 0.2 K/UL
EOSINOPHIL NFR BLD: 2.6 %
ERYTHROCYTE [DISTWIDTH] IN BLOOD BY AUTOMATED COUNT: 12.4 %
EST. GFR  (AFRICAN AMERICAN): >60 ML/MIN/1.73 M^2
EST. GFR  (NON AFRICAN AMERICAN): >60 ML/MIN/1.73 M^2
ESTIMATED AVG GLUCOSE: 223 MG/DL
GLUCOSE SERPL-MCNC: 202 MG/DL
GLUCOSE UR QL STRIP: ABNORMAL
HBA1C MFR BLD HPLC: 9.4 %
HCT VFR BLD AUTO: 43.7 %
HGB BLD-MCNC: 14.5 G/DL
IMM GRANULOCYTES # BLD AUTO: 0.02 K/UL
IMM GRANULOCYTES NFR BLD AUTO: 0.3 %
KETONES UR QL STRIP: NEGATIVE
LEUKOCYTE ESTERASE URINE, POC: ABNORMAL
LYMPHOCYTES # BLD AUTO: 1.6 K/UL
LYMPHOCYTES NFR BLD: 28.1 %
MCH RBC QN AUTO: 30.1 PG
MCHC RBC AUTO-ENTMCNC: 33.2 G/DL
MCV RBC AUTO: 91 FL
MONOCYTES # BLD AUTO: 0.3 K/UL
MONOCYTES NFR BLD: 5 %
NEUTROPHILS # BLD AUTO: 3.6 K/UL
NEUTROPHILS NFR BLD: 63 %
NITRITE, POC UA: NEGATIVE
NRBC BLD-RTO: 0 /100 WBC
PH, POC UA: 5
PLATELET # BLD AUTO: 258 K/UL
PMV BLD AUTO: 11.1 FL
POTASSIUM SERPL-SCNC: 4.7 MMOL/L
PROT SERPL-MCNC: 7.2 G/DL
PROTEIN, POC: NEGATIVE
RBC # BLD AUTO: 4.82 M/UL
SODIUM SERPL-SCNC: 138 MMOL/L
SPECIFIC GRAVITY, POC UA: 1.02
TSH SERPL DL<=0.005 MIU/L-ACNC: 1.44 UIU/ML
UROBILINOGEN, POC UA: NORMAL
WBC # BLD AUTO: 5.76 K/UL

## 2019-01-23 PROCEDURE — 36415 COLL VENOUS BLD VENIPUNCTURE: CPT | Mod: HCNC,PO

## 2019-01-23 PROCEDURE — 99999 PR PBB SHADOW E&M-EST. PATIENT-LVL III: ICD-10-PCS | Mod: PBBFAC,HCNC,, | Performed by: PHYSICIAN ASSISTANT

## 2019-01-23 PROCEDURE — 99214 OFFICE O/P EST MOD 30 MIN: CPT | Mod: 25,HCNC,S$GLB, | Performed by: PHYSICIAN ASSISTANT

## 2019-01-23 PROCEDURE — 85025 COMPLETE CBC W/AUTO DIFF WBC: CPT | Mod: HCNC

## 2019-01-23 PROCEDURE — 3075F PR MOST RECENT SYSTOLIC BLOOD PRESS GE 130-139MM HG: ICD-10-PCS | Mod: HCNC,CPTII,S$GLB, | Performed by: PHYSICIAN ASSISTANT

## 2019-01-23 PROCEDURE — 99499 UNLISTED E&M SERVICE: CPT | Mod: S$GLB,,, | Performed by: PHYSICIAN ASSISTANT

## 2019-01-23 PROCEDURE — 81002 URINALYSIS NONAUTO W/O SCOPE: CPT | Mod: HCNC,S$GLB,, | Performed by: PHYSICIAN ASSISTANT

## 2019-01-23 PROCEDURE — 84443 ASSAY THYROID STIM HORMONE: CPT | Mod: HCNC

## 2019-01-23 PROCEDURE — 99499 RISK ADDL DX/OHS AUDIT: ICD-10-PCS | Mod: S$GLB,,, | Performed by: PHYSICIAN ASSISTANT

## 2019-01-23 PROCEDURE — 81002 POCT URINE DIPSTICK WITHOUT MICROSCOPE: ICD-10-PCS | Mod: HCNC,S$GLB,, | Performed by: PHYSICIAN ASSISTANT

## 2019-01-23 PROCEDURE — 99214 PR OFFICE/OUTPT VISIT, EST, LEVL IV, 30-39 MIN: ICD-10-PCS | Mod: 25,HCNC,S$GLB, | Performed by: PHYSICIAN ASSISTANT

## 2019-01-23 PROCEDURE — 99999 PR PBB SHADOW E&M-EST. PATIENT-LVL III: CPT | Mod: PBBFAC,HCNC,, | Performed by: PHYSICIAN ASSISTANT

## 2019-01-23 PROCEDURE — 83036 HEMOGLOBIN GLYCOSYLATED A1C: CPT | Mod: HCNC

## 2019-01-23 PROCEDURE — 3078F DIAST BP <80 MM HG: CPT | Mod: HCNC,CPTII,S$GLB, | Performed by: PHYSICIAN ASSISTANT

## 2019-01-23 PROCEDURE — 3075F SYST BP GE 130 - 139MM HG: CPT | Mod: HCNC,CPTII,S$GLB, | Performed by: PHYSICIAN ASSISTANT

## 2019-01-23 PROCEDURE — 1101F PT FALLS ASSESS-DOCD LE1/YR: CPT | Mod: HCNC,CPTII,S$GLB, | Performed by: PHYSICIAN ASSISTANT

## 2019-01-23 PROCEDURE — 80053 COMPREHEN METABOLIC PANEL: CPT | Mod: HCNC

## 2019-01-23 PROCEDURE — 3078F PR MOST RECENT DIASTOLIC BLOOD PRESSURE < 80 MM HG: ICD-10-PCS | Mod: HCNC,CPTII,S$GLB, | Performed by: PHYSICIAN ASSISTANT

## 2019-01-23 PROCEDURE — 1101F PR PT FALLS ASSESS DOC 0-1 FALLS W/OUT INJ PAST YR: ICD-10-PCS | Mod: HCNC,CPTII,S$GLB, | Performed by: PHYSICIAN ASSISTANT

## 2019-01-23 NOTE — PROGRESS NOTES
Subjective:       Patient ID: Marina Trimble is a 83 y.o. female.    Chief Complaint: Memory Loss    Ms. Trimble is an 83yr old female who presents today for occasional memory loss. She is a new patient to me. She is accompanied by her . It is my understanding that the  made the appointment because he is concerned about her memory. He says she occasionally forgets what day it is and if it is daytime or nighttime. He expressed concern about her medications and if she is being compliant. He also said she stays on the couch watching TV all night and does not come to bed at times. He believes she does not sleep regularly and that it might be affecting her cognition. She does admit to napping a lot in the day. He expressed his concerned with Dr. Jimenez during his last annual and Dr. Jimenez placed a referral to [facility] for outpatient case management. [facility] called once on pratik kay to which the patient did not answer. The second call was received by the patient, but the patient's  had difficultly hearing on the phone and [facility] advised for PCP referral of Home Health assessment. (I spoke with Dr. Jimenez about the situation and he agreed to place the referral.). The patient was orient to person, place, and time upon this visit. She was able to tell me the year she was born, the current year and who the president is. She denies fever, chills, night sweats, chest pain, shortness of breath, headaches, abdominal pain or bowel changes, urinary symptoms,  new onset weakness or fatigue.       Review of Systems   Constitutional: Negative for activity change, appetite change, chills, fatigue, fever and unexpected weight change.   HENT: Positive for congestion. Negative for rhinorrhea, sinus pain, sneezing, sore throat and trouble swallowing.    Eyes: Negative for visual disturbance.   Respiratory: Negative for cough, chest tightness, shortness of breath and wheezing.    Cardiovascular: Negative for  chest pain, palpitations and leg swelling.   Gastrointestinal: Negative for abdominal distention, abdominal pain, blood in stool, constipation, diarrhea, nausea and vomiting.   Genitourinary: Negative for difficulty urinating, dysuria, frequency and urgency.   Musculoskeletal: Positive for gait problem. Negative for arthralgias and myalgias.        Walks with a walker, fall risk and unsteady gait   Skin: Negative for pallor and rash.   Neurological: Negative for dizziness, syncope, speech difficulty, weakness, light-headedness, numbness and headaches.   Hematological: Negative for adenopathy. Does not bruise/bleed easily.   Psychiatric/Behavioral: Negative for agitation, confusion, hallucinations, sleep disturbance and suicidal ideas. The patient is not nervous/anxious.         Memory loss       Review of patient's allergies indicates:   Allergen Reactions    Baclofen Nausea And Vomiting and Other (See Comments)     Dizziness    Benazepril      Other reaction(s): Rash,Swelling    Carisoprodol      Other reaction(s): Dysphoria    Desloratadine      Other reaction(s): Nausea  Other reaction(s): Nausea    Hydrocodone      Other reaction(s): disorientation    Methocarbamol      Other reaction(s): Nausea    Omeprazole Nausea And Vomiting    Oxybutynin      Other reaction(s): Hallucinations    Oxycodone Nausea And Vomiting    Tramadol      Other reaction(s): dizziness    Benazepril-hydrochlorothiazide Rash     Other reaction(s): Swelling    Metformin Diarrhea    Prednisone Rash       Past Medical History:   Diagnosis Date    Anemia     Aneurysm of aorta     Back pain     Cataract     Diabetes mellitus     sees Endocrine yearly ??   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    Melanoma 2011    melanoma in situ of R nasal tip, excised with FTSG and treated with Aldara    MGUS (monoclonal gammopathy of unknown significance)     Dr Hodges    Osteopenia     Senile osteoporosis     Skin cancer     Skin neoplasm     Stroke     left weak -     Tongue cancer 2010    [id number]    Trouble in sleeping     Urinary incontinence     Vitamin B 12 deficiency     Vitamin B12 deficiency     Vitamin K deficiency      Family History   Problem Relation Age of Onset    Cataracts Mother     Macular degeneration Mother     Blindness Mother     No Known Problems Father     No Known Problems Son     No Known Problems Son     Coronary artery disease Unknown     COPD Unknown     No Known Problems Sister     No Known Problems Brother     No Known Problems Maternal Aunt     No Known Problems Maternal Uncle     No Known Problems Paternal Aunt     No Known Problems Paternal Uncle     No Known Problems Maternal Grandmother     No Known Problems Maternal Grandfather     No Known Problems Paternal Grandmother     No Known Problems Paternal Grandfather     Skin cancer Neg Hx     Melanoma Neg Hx     Amblyopia Neg Hx     Cancer Neg Hx     Diabetes Neg Hx     Glaucoma Neg Hx     Hypertension Neg Hx     Retinal detachment Neg Hx     Strabismus Neg Hx     Stroke Neg Hx     Thyroid disease Neg Hx     Colon cancer Neg Hx     Esophageal cancer Neg Hx        Past Surgical History:   Procedure Laterality Date    APPENDECTOMY      BLOCK-FACET-LUMBAR Bilateral 4/7/2016    Per[facility] by [facility] at [facility]    BLOCK-NERVE-MEDIAL BRANCH-LUMBAR Bilateral 10/21/2015    Performed by Rhiannon Kirkpatrick MD at  [facility]    BLOCK-NERVE-MEDIAL BRANCH-LUMBAR Bilateral 10/20/2015    [facility] by [facility] at [facility]    BLOCK-NERVE-MEDIAL BRANCH-LUMBAR Bilateral 7/13/2015    Performed by [facility]patrick [facility] at [facility]    CATARACT EXTRACTION W/  INTRAOCULAR LENS IMPLANT Left 10/16/14    OS ()    CHOLECYSTECTOMY      COLECTOMY      COLONOSCOPY      COLONOSCOPY N/A 2/7/2013    Performed by [facility] Hall [facility] at [facility] ([facility])    ESOPHAGOGASTRODUODENOSCOPY (EGD) N/A 10/10/2017    Performed by Terence Mccloud MD at [facility] (4TH FLR)    ESOPHAGOGASTRODUODENOSCOPY (EGD) N/A 7/11/2017    Performed by Claus Ram MD at [facility] (2ND FLR)    EYE SURGERY Bilateral     cataracts    eyelid surgery      HYSTERECTOMY      INJECTION-JOINT Bilateral 9/16/2015    Performed by [facility]patrick [facility] at [facility]    INSERTION-INTRAOCULAR LENS (IOL) Left 10/16/2014    Performed by [facility]z [facility] at [facility] F[facility]    OOPHORECTOMY      PARTIAL GLOSSECTOMY  2010    PHACOEMULSIFICATION-ASPIRATION-CATARACT Left 10/16/2014    Performed by [facility] at [facility]    RADIOFREQUENCY THERMOCOAGULATION (RFTC)-NERVE-MEDIAN BRANCH-LUMBAR Right 2/18/2016    Performed by [facility] at [facility]    RADIOFREQUENCY THERMOCOAGULATION (RFTC)-NERVE-MEDIAN BRANCH-LUMBAR Left 2/4/2016    Performed by [facility]a [facility] at [facility]    RADIOFREQUENCY THERMOCOAGULATION (RFTC)-NERVE-MEDIAN BRANCH-LUMBAR Bilateral 7/14/2015    Performed by [facility]patrick [facility] at [facility]    SELECTIVE NECK DISSECTION  2010    tonsilectomy      TONSILLECTOMY       Social History     Socioeconomic History    Marital status:      Spouse name: None    Number of children: None    Years of education: None    Highest education level: None   Social Needs    Financial resource strain: None    Food insecurity - worry: None    Food insecurity - inability:  None    Transportation needs - medical: None    Transportation needs - non-medical: None   Occupational History    None   Tobacco Use    Smoking status: Never Smoker    Smokeless tobacco: Never Used   Substance and Sexual Activity    Alcohol use: No     Alcohol/week: 0.0 oz    Drug use: No    Sexual activity: No   Other Topics Concern    Are you pregnant or think you may be? No    Breast-feeding No   Social History Narrative    She is . She lives on the [facility].        Current Outpatient Medications:     acetaminophen (TYLENOL) 500 MG tablet, Take 1 tablet (500 mg total) by mouth every 4 (four) hours as needed for Pain., Disp: , Rfl: 0    aspirin (ECOTRIN) 81 MG EC tablet, Take 81 mg by mouth once daily., Disp: , Rfl:     atorvastatin (LIPITOR) 40 MG tablet, TAKE 1 TABLET EVERY DAY, Disp: 90 tablet, Rfl: 3    clopidogrel (PLAVIX) 75 mg tablet, TAKE 1 TABLET EVERY DAY, Disp: 90 tablet, Rfl: 3    cyanocobalamin 1,000 mcg/mL injection, Inject 1 mL (1,000 mcg total) into the muscle every 21 days., Disp: 6 mL, Rfl: 11    cycloSPORINE (RESTASIS) 0.05 % ophthalmic emulsion, Place 0.4 mLs (1 drop total) into both eyes 2 (two) times daily., Disp: 180 vial, Rfl: 12    insulin aspart U-100 (NOVOLOG FLEXPEN U-100 INSULIN) 100 unit/mL InPn pen, Inject 4/6/6 units into skin with correction scale 150-200 +1, 201-250 +2, 251-300 +3, 301-350 +4, >350 +5., Disp: 6 mL, Rfl: 4    insulin aspart U-100 (NOVOLOG FLEXPEN U-100 INSULIN) 100 unit/mL InPn pen, Inject 4/6/6 units into skin with correction scale 150-200 +1, 201-250 +2, 251-300 +3, 301-350 +4, >350 +5., Disp: 100 mL, Rfl: 0    LEVEMIR FLEXTOUCH U-100 INSULN 100 unit/mL (3 mL) InPn pen, INJECT  35 UNITS SUBCUTANEOUSLY EVERY EVENING, Disp: 30 mL, Rfl: 4    losartan (COZAAR) 100 MG tablet, TAKE 1 TABLET EVERY DAY, Disp: 90 tablet, Rfl: 90    multivitamin (CHEWABLE MULTI VITAMIN) Chew, Take 2 tablets by mouth once daily. , Disp: , Rfl:     naproxen  "(NAPROSYN) 500 MG tablet, Take 1 tablet (500 mg total) by mouth 2 (two) times daily with meals., Disp: 11 tablet, Rfl: 0    nitroGLYCERIN (NITROSTAT) 0.4 MG SL tablet, Place 1 tablet (0.4 mg total) under the tongue every 5 (five) minutes as needed for Chest pain., Disp: 30 tablet, Rfl: 12    omeprazole (PRILOSEC) 40 MG capsule, TAKE 1 CAPSULE EVERY MORNING, Disp: 90 capsule, Rfl: 90    prochlorperazine (COMPAZINE) 10 MG tablet, Take 1 tablet (10 mg total) by mouth every 6 (six) hours as needed., Disp: 30 tablet, Rfl: 3    vitamin D 1000 units Tab, Take 1 tablet (1,000 Units total) by mouth once daily., Disp: , Rfl:     ACCU-CHEK FASTCLIX LANCET DRUM [facility], USE TO CHECK BLOOD GLUCOSE THREE TIMES DAILY, Disp: 30 each, Rfl: 11    ACCU-CHEK SMARTVIEW TEST STRIP Strp, CHECK BLOOD GLUCOSE THREE TIMES DAILY, Disp: 300 strip, Rfl: 11    BD ULTRA-FINE BEVERLY PEN NEEDLE 32 gauge x 5/32" Ndle, USE TWICE DAILY, Disp: 500 each, Rfl: 12    blood-glucose meter Misc, Use ad directed (Accu Check Beverly smartview meter), Disp: 1 each, Rfl: 4    flash glucose scanning reader (FREESTYLE KHOI 10 DAY READER) Misc, 1 Device by Misc.(Non-Drug; Combo Route) route 3 (three) times daily. Disp 1 sensor for every 10 days, Disp: 3 each, Rfl: 12    NOVOFINE 32 32 gauge x 1/4" Ndle, USE WITH LEVEMIR PEN EVERY EVENING, Disp: 5000 each, Rfl: 12    TRUEPLUS LANCETS 28 gauge Misc, USE  TO CHECK BLOOD SUGAR THREE TIMES DAILY, Disp: 300 each, Rfl: 0       Objective:       Vitals:    01/23/19 1114   BP: 134/78   BP Location: Right arm   Patient Position: Sitting   BP Method: Medium (Manual)   Pulse: 71   Temp: 98.5 °F (36.9 °C)   TempSrc: Oral   SpO2: 99%   Weight: 59.9 kg (132 lb)   Height: 4' 11" (1.499 m)     Physical Exam   Constitutional: She is oriented to person, place, and time. Vital signs are normal. She appears well-developed and well-nourished.   HENT:   Head: Normocephalic.   Right Ear: Hearing, external ear and ear canal normal. " Tympanic membrane is not erythematous and not bulging. No middle ear effusion.   Left Ear: Hearing, tympanic membrane, external ear and ear canal normal. Tympanic membrane is not erythematous and not bulging.  No middle ear effusion.   Nose: Nose normal. No mucosal edema or rhinorrhea. Right sinus exhibits no frontal sinus tenderness. Left sinus exhibits no frontal sinus tenderness.   Mouth/Throat: Uvula is midline, oropharynx is clear and moist and mucous membranes are normal. No oropharyngeal exudate, posterior oropharyngeal edema or posterior oropharyngeal erythema.   Eyes: Conjunctivae, EOM and lids are normal. Pupils are equal, round, and reactive to light.   Neck: Full passive range of motion without pain. Carotid bruit is not present. No thyroid mass and no thyromegaly present.   Cardiovascular: Regular rhythm, S1 normal and S2 normal. Exam reveals no gallop and no friction rub.   No murmur heard.  Pulmonary/Chest: Effort normal and breath sounds normal. She has no wheezes. She has no rhonchi. She has no rales.   Abdominal: Soft. Normal appearance and bowel sounds are normal. There is no tenderness.   Lymphadenopathy:     She has no cervical adenopathy.        Right cervical: No superficial cervical adenopathy present.       Left cervical: No superficial cervical adenopathy present.   Neurological: She is alert and oriented to person, place, and time. She has normal strength.   Skin: Skin is warm, dry and intact. No rash noted. She is not diaphoretic.   Psychiatric: She has a normal mood and affect. Her speech is normal. She exhibits abnormal remote memory. She exhibits normal recent memory. She is attentive.       Assessment:       1. Memory changes    2. Hyperglycemia    3. Vitamin B12 deficiency    4. Essential hypertension    5. Atherosclerosis of aorta    6. Diabetes mellitus with neurological manifestations, uncontrolled        Plan:     Ms. Trimble was seen today for memory changes.    Diagnoses and  all orders for today's visit:           Memory changes  -     Hemoglobin A1c; Future; Expected date: 01/23/2019  -     Comprehensive metabolic panel; Future; Expected date: 01/23/2019  -     CBC auto differential; Future; Expected date: 01/23/2019  -     TSH; Future; Expected date: 01/23/2019  -     POCT URINE DIPSTICK WITHOUT MICROSCOPE; negative for bacterial infection but showed glycosuria likely due to uncontrolled diabetes  - Referral for  to assess medications, I spoke with and sent a message to Dr. Jimenez who will place referral.   - Advised to follow up if symptoms worsen or new symptoms develop     Hyperglycemia  -     Hemoglobin A1c; Future; Expected date: 01/23/2019    Vitamin B12 deficiency  -chronic and stable; receiving injections by Dr. Hodges, reviewed in notes  -Last serum B12 was >1000 pg/mL 10 months ago    Essential hypertension  -chronic and stable was reviewed in record, controlled with medication    Atherosclerosis of aorta  -chronic and stable was reviewed in record    Diabetes mellitus with neurological manifestations, uncontrolled  -   Hemoglobin A1c; Future; Expected date: 01/23/2019        Paty Shepherd PA-C  [facility]/ Internal Med/ Peds

## 2019-01-23 NOTE — PATIENT INSTRUCTIONS
Confusion  Confusion is a change in a persons ability to think clearly. There may be trouble recognizing familiar people and places or knowing what day it is. Memory, judgment, and decision-making may also be affected. In severe cases, the person may have limited or no response to being spoken to.  Confusion is usually a sign of an underlying problem. It may occur suddenly. Or it may develop gradually over time. Causes of confusion include brain injury, medicines, alcohol, withdrawal from certain medicines or illegal drugs, and infection. Heart attack and stroke may cause it. Confusion can also be a sign of dementia or a mental illness.  Treatment will depend on the cause of the problem. If the issue is a medicine, stopping the medicine may help. The healthcare provider will perform an evaluation and certain tests may be done. Follow up with the healthcare provider for the results.  Home care  · Be sure someone is with the confused person at all times. He or she should not be left alone or unsupervised.  · Tell the healthcare provider about all medicines that the person takes. These include prescription, over-the-counter, herbs, and supplements.  · Dehydration can increase confusion. Ask the healthcare provider how much fluid the person should be drinking. Offer liquids and ensure that they are taken.  · Keep all medicines in a secure place under the caregivers control. To prevent overdose, a confused person should take medicines only under the supervision of a caregiver.  · To help a person with confusion:  ¨ Establish a daily routine. Change can be a source of stress for someone with confusion. Make and keep a time schedule for common tasks such as bathing, dressing, taking medicines, meals, going for walks, shopping, naps and bed time.  ¨ Speak slowly and clearly with a gentle tone of voice. Use short simple words and sentences. Ask one question at a time. Do not interrupt, criticize or argue. Be calm and  supportive. Use friendly facial expressions. Use pointing and touching to help communicate. If there has been loss of long-term memory, do not ask questions about past events. This would only cause frustration for the person.  ¨ Use lists, signs, family photos, clocks and calendars as memory aids. Label cabinets and drawers. Try to distract, not confront, the patient. When he/she becomes frustrated or upset, redirect his/her attention to eating or some other activity of interest.  ¨ If this proves to be due to a permanent condition, talk to the healthcare provider or a  about getting a Power of  for healthcare and for financial decisions. It is best to do this while the person can still sign legal documents and make his or her own decisions. Otherwise, a court order will be required.  Follow-up care  Follow up with the person's healthcare provider or as advised for further testing or changes in medical care.  When to seek medical advice  Call the healthcare provider for any of the following:  · Frequent falling  · Refusal to eat or drink  · Violent behavior or behavior too difficult to manage at home  · New hallucinations or delusions  · Increased drowsiness  · Complaints of headache or numbness or weakness of the face, arm or leg  · Nausea or vomiting  · Slurred speech or trouble speaking, walking, or seeing  · Fainting spell, dizziness or seizure  · Unexplained fever over 100.4º F (38.0º C) or as directed by the healthcare provider  Date Last Reviewed: 8/23/2015  © 3974-8587 [employer]. [address] [place] [address]. All rights reserved. This information is not intended as a substitute for professional medical care. Always follow your healthcare professional's instructions.        Step-by-Step  Checking Your Blood Sugar    Date Last Reviewed: 5/1/2016  © 3186-2009 [employer]. [address] [place] [address]. All rights reserved. This information is  not intended as a substitute for professional medical care. Always follow your healthcare professional's instructions.

## 2019-01-24 ENCOUNTER — TELEPHONE (OUTPATIENT)
Dept: FAMILY MEDICINE | Facility: CLINIC | Age: 84
End: 2019-01-24

## 2019-01-24 NOTE — TELEPHONE ENCOUNTER
Please advise patient's  that her sugar is up. Could be due to diet or medication issue. Dr. Jimenez is placing referral for home health evaluation for medication assessment.     All other labs are within normal limits, no sign of infection or deficiencies.     Thank you!

## 2019-01-25 ENCOUNTER — TELEPHONE (OUTPATIENT)
Dept: FAMILY MEDICINE | Facility: CLINIC | Age: 84
End: 2019-01-25

## 2019-01-25 DIAGNOSIS — Z86.73 HISTORY OF CVA (CEREBROVASCULAR ACCIDENT): ICD-10-CM

## 2019-01-25 DIAGNOSIS — Z91.89 AT RISK FOR POLYPHARMACY: Primary | ICD-10-CM

## 2019-01-25 DIAGNOSIS — R41.3 MEMORY CHANGES: ICD-10-CM

## 2019-01-25 DIAGNOSIS — R53.81 DEBILITY: ICD-10-CM

## 2019-01-25 DIAGNOSIS — M48.061 SPINAL STENOSIS OF LUMBAR REGION, UNSPECIFIED WHETHER NEUROGENIC CLAUDICATION PRESENT: ICD-10-CM

## 2019-01-25 NOTE — TELEPHONE ENCOUNTER
----- Message from Paty Shepherd PA-C sent at 1/23/2019 12:24 PM CST -----  Can you put a referral to HH for this patient as advised below by LMSW. Patient has had recent memory changes and needs meds evaluated. Thank you!      2nd attempt      This LMSW attempted to reach patient/caregiver Smith Lynch  to provide resource. Smith reports he is hard of hearing and reports it may be better to complete this assessment  In person. LMSW will advised PCP of caregiver request and will ask PCP to please access for HH evaluation as patient is low risk and a home visit will not be avilable at this time.        Electronically signed by Lottie Mack LMSW at 12/27/2018  3:44 PM

## 2019-01-29 ENCOUNTER — TELEPHONE (OUTPATIENT)
Dept: HEMATOLOGY/ONCOLOGY | Facility: CLINIC | Age: 84
End: 2019-01-29

## 2019-01-29 DIAGNOSIS — C82.96 FOLLICULAR LYMPHOMA OF INTRAPELVIC LYMPH NODES, UNSPECIFIED FOLLICULAR LYMPHOMA TYPE: Primary | ICD-10-CM

## 2019-01-29 DIAGNOSIS — E53.8 VITAMIN B12 DEFICIENCY: ICD-10-CM

## 2019-01-29 DIAGNOSIS — D68.9 COAGULATION DISORDER: ICD-10-CM

## 2019-01-29 NOTE — TELEPHONE ENCOUNTER
----- Message from Tereza Chaudhry sent at 1/29/2019 10:08 AM CST -----  Contact: Patient  Pt would like to schedule f/u appt with Dr. Sandoval.    Contact:: [phone]

## 2019-02-07 ENCOUNTER — PES CALL (OUTPATIENT)
Dept: ADMINISTRATIVE | Facility: CLINIC | Age: 84
End: 2019-02-07

## 2019-02-08 ENCOUNTER — TELEPHONE (OUTPATIENT)
Dept: HEMATOLOGY/ONCOLOGY | Facility: CLINIC | Age: 84
End: 2019-02-08

## 2019-02-08 NOTE — TELEPHONE ENCOUNTER
----- Message from Tereza Chaudhry sent at 2/8/2019 11:34 AM CST -----  Contact: Patient  Pt would like to change 03/13 appt with Dr. Sandoval, she wants to come in and see Dr. Grullon instead.     Contact:: [phone]

## 2019-03-13 ENCOUNTER — INITIAL CONSULT (OUTPATIENT)
Dept: HEMATOLOGY/ONCOLOGY | Facility: CLINIC | Age: 84
End: 2019-03-13
Payer: MEDICARE

## 2019-03-13 ENCOUNTER — LAB VISIT (OUTPATIENT)
Dept: LAB | Facility: HOSPITAL | Age: 84
End: 2019-03-13
Payer: MEDICARE

## 2019-03-13 VITALS
SYSTOLIC BLOOD PRESSURE: 135 MMHG | BODY MASS INDEX: 26.48 KG/M2 | OXYGEN SATURATION: 97 % | DIASTOLIC BLOOD PRESSURE: 64 MMHG | RESPIRATION RATE: 17 BRPM | HEART RATE: 82 BPM | WEIGHT: 131.38 LBS | HEIGHT: 59 IN | TEMPERATURE: 98 F

## 2019-03-13 DIAGNOSIS — C82.96 FOLLICULAR LYMPHOMA OF INTRAPELVIC LYMPH NODES, UNSPECIFIED FOLLICULAR LYMPHOMA TYPE: Primary | ICD-10-CM

## 2019-03-13 DIAGNOSIS — D51.8 OTHER VITAMIN B12 DEFICIENCY ANEMIAS: ICD-10-CM

## 2019-03-13 DIAGNOSIS — C82.96 FOLLICULAR LYMPHOMA OF INTRAPELVIC LYMPH NODES, UNSPECIFIED FOLLICULAR LYMPHOMA TYPE: ICD-10-CM

## 2019-03-13 DIAGNOSIS — E53.8 VITAMIN B12 DEFICIENCY: ICD-10-CM

## 2019-03-13 DIAGNOSIS — D47.2 MGUS (MONOCLONAL GAMMOPATHY OF UNKNOWN SIGNIFICANCE): ICD-10-CM

## 2019-03-13 DIAGNOSIS — D68.9 COAGULATION DISORDER: ICD-10-CM

## 2019-03-13 DIAGNOSIS — Z79.4 ENCOUNTER FOR LONG-TERM (CURRENT) USE OF INSULIN: ICD-10-CM

## 2019-03-13 LAB
ALBUMIN SERPL BCP-MCNC: 4 G/DL
ALP SERPL-CCNC: 106 U/L
ALT SERPL W/O P-5'-P-CCNC: 25 U/L
ANION GAP SERPL CALC-SCNC: 10 MMOL/L
APTT BLDCRRT: 24 SEC
AST SERPL-CCNC: 23 U/L
BASOPHILS # BLD AUTO: 0.05 K/UL
BASOPHILS NFR BLD: 0.6 %
BILIRUB SERPL-MCNC: 0.6 MG/DL
BUN SERPL-MCNC: 11 MG/DL
CALCIUM SERPL-MCNC: 9.8 MG/DL
CHLORIDE SERPL-SCNC: 100 MMOL/L
CO2 SERPL-SCNC: 29 MMOL/L
CREAT SERPL-MCNC: 0.9 MG/DL
DIFFERENTIAL METHOD: NORMAL
EOSINOPHIL # BLD AUTO: 0.2 K/UL
EOSINOPHIL NFR BLD: 2 %
ERYTHROCYTE [DISTWIDTH] IN BLOOD BY AUTOMATED COUNT: 12.8 %
EST. GFR  (AFRICAN AMERICAN): >60 ML/MIN/1.73 M^2
EST. GFR  (NON AFRICAN AMERICAN): 59.7 ML/MIN/1.73 M^2
FERRITIN SERPL-MCNC: 269 NG/ML
GLUCOSE SERPL-MCNC: 283 MG/DL
HCT VFR BLD AUTO: 46.7 %
HGB BLD-MCNC: 15.3 G/DL
IMM GRANULOCYTES # BLD AUTO: 0.04 K/UL
IMM GRANULOCYTES NFR BLD AUTO: 0.5 %
LYMPHOCYTES # BLD AUTO: 1.9 K/UL
LYMPHOCYTES NFR BLD: 22.6 %
MCH RBC QN AUTO: 29.5 PG
MCHC RBC AUTO-ENTMCNC: 32.8 G/DL
MCV RBC AUTO: 90 FL
MONOCYTES # BLD AUTO: 0.4 K/UL
MONOCYTES NFR BLD: 5 %
NEUTROPHILS # BLD AUTO: 5.8 K/UL
NEUTROPHILS NFR BLD: 69.3 %
NRBC BLD-RTO: 0 /100 WBC
PLATELET # BLD AUTO: 248 K/UL
PMV BLD AUTO: 11.1 FL
POTASSIUM SERPL-SCNC: 3.7 MMOL/L
PROT SERPL-MCNC: 7.8 G/DL
RBC # BLD AUTO: 5.18 M/UL
SODIUM SERPL-SCNC: 139 MMOL/L
VIT B12 SERPL-MCNC: 1724 PG/ML
WBC # BLD AUTO: 8.41 K/UL

## 2019-03-13 PROCEDURE — 99214 PR OFFICE/OUTPT VISIT, EST, LEVL IV, 30-39 MIN: ICD-10-PCS | Mod: HCNC,S$GLB,, | Performed by: INTERNAL MEDICINE

## 2019-03-13 PROCEDURE — 3078F PR MOST RECENT DIASTOLIC BLOOD PRESSURE < 80 MM HG: ICD-10-PCS | Mod: HCNC,CPTII,S$GLB, | Performed by: INTERNAL MEDICINE

## 2019-03-13 PROCEDURE — 3078F DIAST BP <80 MM HG: CPT | Mod: HCNC,CPTII,S$GLB, | Performed by: INTERNAL MEDICINE

## 2019-03-13 PROCEDURE — 84165 PATHOLOGIST INTERPRETATION SPE: ICD-10-PCS | Mod: 26,HCNC,, | Performed by: PATHOLOGY

## 2019-03-13 PROCEDURE — 82728 ASSAY OF FERRITIN: CPT | Mod: HCNC

## 2019-03-13 PROCEDURE — 99999 PR PBB SHADOW E&M-EST. PATIENT-LVL III: CPT | Mod: PBBFAC,HCNC,, | Performed by: INTERNAL MEDICINE

## 2019-03-13 PROCEDURE — 1101F PR PT FALLS ASSESS DOC 0-1 FALLS W/OUT INJ PAST YR: ICD-10-PCS | Mod: HCNC,CPTII,S$GLB, | Performed by: INTERNAL MEDICINE

## 2019-03-13 PROCEDURE — 85730 THROMBOPLASTIN TIME PARTIAL: CPT | Mod: HCNC

## 2019-03-13 PROCEDURE — 99499 UNLISTED E&M SERVICE: CPT | Mod: HCNC,S$GLB,, | Performed by: INTERNAL MEDICINE

## 2019-03-13 PROCEDURE — 3075F PR MOST RECENT SYSTOLIC BLOOD PRESS GE 130-139MM HG: ICD-10-PCS | Mod: HCNC,CPTII,S$GLB, | Performed by: INTERNAL MEDICINE

## 2019-03-13 PROCEDURE — 82607 VITAMIN B-12: CPT | Mod: HCNC

## 2019-03-13 PROCEDURE — 1101F PT FALLS ASSESS-DOCD LE1/YR: CPT | Mod: HCNC,CPTII,S$GLB, | Performed by: INTERNAL MEDICINE

## 2019-03-13 PROCEDURE — 80053 COMPREHEN METABOLIC PANEL: CPT | Mod: HCNC

## 2019-03-13 PROCEDURE — 99499 RISK ADDL DX/OHS AUDIT: ICD-10-PCS | Mod: HCNC,S$GLB,, | Performed by: INTERNAL MEDICINE

## 2019-03-13 PROCEDURE — 85025 COMPLETE CBC W/AUTO DIFF WBC: CPT | Mod: HCNC

## 2019-03-13 PROCEDURE — 3075F SYST BP GE 130 - 139MM HG: CPT | Mod: HCNC,CPTII,S$GLB, | Performed by: INTERNAL MEDICINE

## 2019-03-13 PROCEDURE — 99999 PR PBB SHADOW E&M-EST. PATIENT-LVL III: ICD-10-PCS | Mod: PBBFAC,HCNC,, | Performed by: INTERNAL MEDICINE

## 2019-03-13 PROCEDURE — 99214 OFFICE O/P EST MOD 30 MIN: CPT | Mod: HCNC,S$GLB,, | Performed by: INTERNAL MEDICINE

## 2019-03-13 PROCEDURE — 36415 COLL VENOUS BLD VENIPUNCTURE: CPT | Mod: HCNC

## 2019-03-13 PROCEDURE — 84165 PROTEIN E-PHORESIS SERUM: CPT | Mod: 26,HCNC,, | Performed by: PATHOLOGY

## 2019-03-13 PROCEDURE — 84165 PROTEIN E-PHORESIS SERUM: CPT | Mod: HCNC

## 2019-03-13 RX ORDER — DICYCLOMINE HYDROCHLORIDE 10 MG/1
CAPSULE ORAL
Refills: 2 | COMMUNITY
Start: 2019-02-09

## 2019-03-13 NOTE — Clinical Note
Follow-up in 1 year with CBC, CMP, SPEP, DORIS, free light chains, B12, Iron/TIBC, ferritin drawn one week before visit at [facility] lab

## 2019-03-14 LAB
ALBUMIN SERPL ELPH-MCNC: 4.14 G/DL
ALPHA1 GLOB SERPL ELPH-MCNC: 0.34 G/DL
ALPHA2 GLOB SERPL ELPH-MCNC: 1.07 G/DL
B-GLOBULIN SERPL ELPH-MCNC: 0.84 G/DL
GAMMA GLOB SERPL ELPH-MCNC: 1.01 G/DL
PATHOLOGIST INTERPRETATION SPE: NORMAL
PROT SERPL-MCNC: 7.4 G/DL

## 2019-03-20 NOTE — PROGRESS NOTES
HEMATOLOGIC MALIGNANCIES CONSULT NOTE    IDENTIFYING STATEMENT   Marina Arguello) is a 83 y.o. female with a  of 1935 from [place], referred by Dr. 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     HISTORY OF PRESENT ILLNESS:      Ms. Trimble presents to clinic for first appointment with me. She was previously followed by Dr. Hodges, who has now retired. She has a history of follicular lymphoma, diagnosed in  from a retroperitoneal lymph node biopsy. She received 6 cycles of CHOP chemotherapy and radiation to periaortic lymph nodes, completing in 3/1998.     She has never had recurrence of lymphoma.    She now is managed primarily for iron deficiency anemia related to chronic GI blood loss (due to AVMs), B12 deficiency, and MGUS.     She otherwise feels well. She is medically complex with many chronic comorbidities. She otherwise feels well.     Past Medical History:   Diagnosis Date    Anemia     Aneurysm of aorta     Back pain     Cataract     Diabetes mellitus     sees Endocrine yearly ??    Diabetes mellitus with neurological manifestations, uncontrolled 10/1/2014    Diabetes mellitus, type 2     Erosive (osteo)arthritis 2016    GERD (gastroesophageal reflux disease)     Gingivitis, acute     Gout, arthritis     Hemiparesis affecting left side as late effect of stroke 10/1/2014    History of colon polyps     History of compression fracture of vertebral column 2015    History of tongue cancer 2012    Hyperlipidemia     Hypertension     Inflammatory bowel disease     Lymphoma of lymph nodes in pelvis     Melanoma     melanoma in situ of R nasal tip, excised with FTSG and treated with Aldara    MGUS (monoclonal gammopathy of unknown significance)     Dr Hodges    Osteopenia     Senile osteoporosis     Skin cancer     Skin neoplasm     Stroke     left weak -     Tongue cancer     [id number]    Trouble in sleeping     Urinary  incontinence     Vitamin B 12 deficiency     Vitamin B12 deficiency     Vitamin K deficiency        Family History   Problem Relation Age of Onset    Cataracts Mother     Macular degeneration Mother     Blindness Mother     No Known Problems Father     No Known Problems Son     No Known Problems Son     Coronary artery disease Unknown     COPD Unknown     No Known Problems Sister     No Known Problems Brother     No Known Problems Maternal Aunt     No Known Problems Maternal Uncle     No Known Problems Paternal Aunt     No Known Problems Paternal Uncle     No Known Problems Maternal Grandmother     No Known Problems Maternal Grandfather     No Known Problems Paternal Grandmother     No Known Problems Paternal Grandfather     Skin cancer Neg Hx     Melanoma Neg Hx     Amblyopia Neg Hx     Cancer Neg Hx     Diabetes Neg Hx     Glaucoma Neg Hx     Hypertension Neg Hx     Retinal detachment Neg Hx     Strabismus Neg Hx     Stroke Neg Hx     Thyroid disease Neg Hx     Colon cancer Neg Hx     Esophageal cancer Neg Hx        Social History     Socioeconomic History    Marital status:      Spouse name: Not on file    Number of children: Not on file    Years of education: Not on file    Highest education level: Not on file   Social Needs    Financial resource strain: Not on file    Food insecurity - worry: Not on file    Food insecurity - inability: Not on file    Transportation needs - medical: Not on file    Transportation needs - non-medical: Not on file   Occupational History    Not on file   Tobacco Use    Smoking status: Never Smoker    Smokeless tobacco: Never Used   Substance and Sexual Activity    Alcohol use: No     Alcohol/week: 0.0 oz    Drug use: No    Sexual activity: No   Other Topics Concern    Are you pregnant or think you may be? No    Breast-feeding No   Social History Narrative    She is . She lives on the [facility].         MEDICATIONS:  "    Current Outpatient Medications on File Prior to Visit   Medication Sig Dispense Refill    ACCU-CHEK FASTCLIX LANCET DRUM Misc USE TO CHECK BLOOD GLUCOSE THREE TIMES DAILY 30 each 11    ACCU-CHEK SMARTVIEW TEST STRIP Strp CHECK BLOOD GLUCOSE THREE TIMES DAILY 300 strip 11    acetaminophen (TYLENOL) 500 MG tablet Take 1 tablet (500 mg total) by mouth every 4 (four) hours as needed for Pain.  0    aspirin (ECOTRIN) 81 MG EC tablet Take 81 mg by mouth once daily.      atorvastatin (LIPITOR) 40 MG tablet TAKE 1 TABLET EVERY DAY 90 tablet 3    BD ULTRA-FINE BEVERLY PEN NEEDLE 32 gauge x 5/32" Ndle USE TWICE DAILY 500 each 12    blood-glucose meter Misc Use ad directed (Accu Check Beverly smartview meter) 1 each 4    clopidogrel (PLAVIX) 75 mg tablet TAKE 1 TABLET EVERY DAY 90 tablet 3    cyanocobalamin 1,000 mcg/mL injection Inject 1 mL (1,000 mcg total) into the muscle every 21 days. 6 mL 11    dicyclomine (BENTYL) 10 MG capsule   2    flash glucose scanning reader (EventiozSTSGB KHOI 10 DAY READER) Misc 1 Device by Misc.(Non-Drug; Combo Route) route 3 (three) times daily. Disp 1 sensor for every 10 days 3 each 12    insulin aspart U-100 (NOVOLOG FLEXPEN U-100 INSULIN) 100 unit/mL InPn pen Inject 4/6/6 units into skin with correction scale 150-200 +1, 201-250 +2, 251-300 +3, 301-350 +4, >350 +5. 6 mL 4    insulin aspart U-100 (NOVOLOG FLEXPEN U-100 INSULIN) 100 unit/mL InPn pen Inject 4/6/6 units into skin with correction scale 150-200 +1, 201-250 +2, 251-300 +3, 301-350 +4, >350 +5. 100 mL 0    LEVEMIR FLEXTOUCH U-100 INSULN 100 unit/mL (3 mL) InPn pen INJECT  35 UNITS SUBCUTANEOUSLY EVERY EVENING 30 mL 4    losartan (COZAAR) 100 MG tablet TAKE 1 TABLET EVERY DAY 90 tablet 90    multivitamin (CHEWABLE MULTI VITAMIN) Chew Take 2 tablets by mouth once daily.       naproxen (NAPROSYN) 500 MG tablet Take 1 tablet (500 mg total) by mouth 2 (two) times daily with meals. 11 tablet 0    NOVOFINE 32 32 gauge x 1/4" " Ndle USE WITH LEVEMIR PEN EVERY EVENING 5000 each 12    omeprazole (PRILOSEC) 40 MG capsule TAKE 1 CAPSULE EVERY MORNING 90 capsule 90    prochlorperazine (COMPAZINE) 10 MG tablet Take 1 tablet (10 mg total) by mouth every 6 (six) hours as needed. 30 tablet 3    TRUEPLUS LANCETS 28 gauge Misc USE  TO CHECK BLOOD SUGAR THREE TIMES DAILY 300 each 0    vitamin D 1000 units Tab Take 1 tablet (1,000 Units total) by mouth once daily.      cycloSPORINE (RESTASIS) 0.05 % ophthalmic emulsion Place 0.4 mLs (1 drop total) into both eyes 2 (two) times daily. 180 vial 12    nitroGLYCERIN (NITROSTAT) 0.4 MG SL tablet Place 1 tablet (0.4 mg total) under the tongue every 5 (five) minutes as needed for Chest pain. 30 tablet 12     No current facility-administered medications on file prior to visit.        ALLERGIES:   Review of patient's allergies indicates:   Allergen Reactions    Baclofen Nausea And Vomiting and Other (See Comments)     Dizziness    Benazepril      Other reaction(s): Rash,Swelling    Carisoprodol      Other reaction(s): Dysphoria    Desloratadine      Other reaction(s): Nausea  Other reaction(s): Nausea    Hydrocodone      Other reaction(s): disorientation    Methocarbamol      Other reaction(s): Nausea    Omeprazole Nausea And Vomiting    Oxybutynin      Other reaction(s): Hallucinations    Oxycodone Nausea And Vomiting    Tramadol      Other reaction(s): dizziness    Benazepril-hydrochlorothiazide Rash     Other reaction(s): Swelling    Metformin Diarrhea    Prednisone Rash        ROS:       Review of Systems   Constitutional: Negative for diaphoresis, fatigue, fever and unexpected weight change.   HENT:   Negative for lump/mass and sore throat.    Eyes: Negative for icterus.   Respiratory: Negative for cough and shortness of breath.    Cardiovascular: Negative for chest pain and palpitations.   Gastrointestinal: Negative for abdominal distention, constipation, diarrhea, nausea and vomiting.  "  Genitourinary: Negative for dysuria and frequency.    Musculoskeletal: Negative for arthralgias, gait problem and myalgias.   Skin: Negative for rash.   Neurological: Negative for dizziness, gait problem and headaches.   Hematological: Negative for adenopathy. Does not bruise/bleed easily.   Psychiatric/Behavioral: The patient is not nervous/anxious.        PHYSICAL EXAM:  Vitals:    03/13/19 1311   BP: 135/64   Pulse: 82   Resp: 17   Temp: 98.3 °F (36.8 °C)   SpO2: 97%   Weight: 59.6 kg (131 lb 6.3 oz)   Height: 4' 11" (1.499 m)   PainSc: 0-No pain       Physical Exam   Constitutional: She is oriented to person, place, and time. She appears well-developed and well-nourished. No distress.   HENT:   Head: Normocephalic and atraumatic.   Mouth/Throat: Mucous membranes are normal. No oral lesions.   Eyes: Conjunctivae are normal.   Neck: No thyromegaly present.   Cardiovascular: Normal rate, regular rhythm and normal heart sounds.   No murmur heard.  Pulmonary/Chest: Breath sounds normal. She has no wheezes. She has no rales.   Abdominal: Soft. She exhibits no distension and no mass. There is no splenomegaly or hepatomegaly. There is no tenderness.   Lymphadenopathy:     She has no cervical adenopathy.        Right cervical: No deep cervical adenopathy present.       Left cervical: No deep cervical adenopathy present.     She has no axillary adenopathy.        Right: No inguinal adenopathy present.        Left: No inguinal adenopathy present.   Neurological: She is alert and oriented to person, place, and time. She has normal strength and normal reflexes. No cranial nerve deficit. Coordination normal.   Skin: No rash noted.       LAB:   Results for orders placed or performed in visit on 03/13/19   Rapid BMT CBC with Diff   Result Value Ref Range    WBC 8.41 3.90 - 12.70 K/uL    RBC 5.18 4.00 - 5.40 M/uL    Hemoglobin 15.3 12.0 - 16.0 g/dL    Hematocrit 46.7 37.0 - 48.5 %    MCV 90 82 - 98 fL    MCH 29.5 27.0 - 31.0 " pg    MCHC 32.8 32.0 - 36.0 g/dL    RDW 12.8 11.5 - 14.5 %    Platelets 248 150 - 350 K/uL    MPV 11.1 9.2 - 12.9 fL    Immature Granulocytes 0.5 0.0 - 0.5 %    Gran # (ANC) 5.8 1.8 - 7.7 K/uL    Immature Grans (Abs) 0.04 0.00 - 0.04 K/uL    Lymph # 1.9 1.0 - 4.8 K/uL    Mono # 0.4 0.3 - 1.0 K/uL    Eos # 0.2 0.0 - 0.5 K/uL    Baso # 0.05 0.00 - 0.20 K/uL    nRBC 0 0 /100 WBC    Gran% 69.3 38.0 - 73.0 %    Lymph% 22.6 18.0 - 48.0 %    Mono% 5.0 4.0 - 15.0 %    Eosinophil% 2.0 0.0 - 8.0 %    Basophil% 0.6 0.0 - 1.9 %    Differential Method Automated    Comprehensive metabolic panel   Result Value Ref Range    Sodium 139 136 - 145 mmol/L    Potassium 3.7 3.5 - 5.1 mmol/L    Chloride 100 95 - 110 mmol/L    CO2 29 23 - 29 mmol/L    Glucose 283 (H) 70 - 110 mg/dL    BUN, Bld 11 8 - 23 mg/dL    Creatinine 0.9 0.5 - 1.4 mg/dL    Calcium 9.8 8.7 - 10.5 mg/dL    Total Protein 7.8 6.0 - 8.4 g/dL    Albumin 4.0 3.5 - 5.2 g/dL    Total Bilirubin 0.6 0.1 - 1.0 mg/dL    Alkaline Phosphatase 106 55 - 135 U/L    AST 23 10 - 40 U/L    ALT 25 10 - 44 U/L    Anion Gap 10 8 - 16 mmol/L    eGFR if African American >60.0 >60 mL/min/1.73 m^2    eGFR if non  59.7 (A) >60 mL/min/1.73 m^2   Protein electrophoresis, serum   Result Value Ref Range    Protein, Serum 7.4 6.0 - 8.4 g/dL    Albumin grams/dl 4.14 3.35 - 5.55 g/dL    Alpha-1 grams/dl 0.34 0.17 - 0.41 g/dL    Alpha-2 grams/dl 1.07 (H) 0.43 - 0.99 g/dL    Beta grams/dl 0.84 0.50 - 1.10 g/dL    Gamma grams/dl 1.01 0.67 - 1.58 g/dL   Ferritin   Result Value Ref Range    Ferritin 269 20.0 - 300.0 ng/mL   Vitamin B12   Result Value Ref Range    Vitamin B-12 1724 (H) 210 - 950 pg/mL   APTT   Result Value Ref Range    aPTT 24.0 21.0 - 32.0 sec   Pathologist Interpretation SPE   Result Value Ref Range    Pathologist Interpretation SPE REVIEWED        PROBLEMS ASSESSED THIS VISIT:    1. Follicular lymphoma of intrapelvic lymph nodes, unspecified follicular lymphoma type    2.  MGUS (monoclonal gammopathy of unknown significance)    3. Encounter for long-term (current) use of insulin        IMPRESSION:    1. Follicular lymphoma in remission    2. History of intermittent GI bleeding secondary to AVMs  3. Vitamin B12 deficiency  4. Malabsorption syndrome  5. MGUS    PLAN:       Lymphoma of lymph nodes in pelvis  She has been in remission for over 20 years. She remains asymptomatic. We will continue with clinical follow-up of this issue.     MGUS (monoclonal gammopathy of unknown significance)  We will monitor monoclonal protein studies annually. This has only been intermittently present since 2012.     Encounter for long-term (current) use of insulin  She should continue insulin. She has hyperglycemia at this visit. Follow-up with PCP as needed to manage this.     Follow-up 1 year    Torey Sandoval MD  Hematology and Stem Cell Transplant

## 2019-03-20 NOTE — ASSESSMENT & PLAN NOTE
She should continue insulin. She has hyperglycemia at this visit. Follow-up with PCP as needed to manage this.

## 2019-03-20 NOTE — ASSESSMENT & PLAN NOTE
We will monitor monoclonal protein studies annually. This has only been intermittently present since 2012.

## 2019-03-20 NOTE — ASSESSMENT & PLAN NOTE
She has been in remission for over 20 years. She remains asymptomatic. We will continue with clinical follow-up of this issue.

## 2019-03-26 ENCOUNTER — OFFICE VISIT (OUTPATIENT)
Dept: PODIATRY | Facility: CLINIC | Age: 84
End: 2019-03-26
Payer: MEDICARE

## 2019-03-26 VITALS
SYSTOLIC BLOOD PRESSURE: 136 MMHG | BODY MASS INDEX: 26.48 KG/M2 | DIASTOLIC BLOOD PRESSURE: 74 MMHG | HEIGHT: 59 IN | WEIGHT: 131.38 LBS

## 2019-03-26 DIAGNOSIS — E11.51 TYPE II DIABETES MELLITUS WITH PERIPHERAL CIRCULATORY DISORDER: ICD-10-CM

## 2019-03-26 DIAGNOSIS — B35.1 NAIL DERMATOPHYTOSIS: ICD-10-CM

## 2019-03-26 DIAGNOSIS — E11.49 TYPE II DIABETES MELLITUS WITH NEUROLOGICAL MANIFESTATIONS: Primary | ICD-10-CM

## 2019-03-26 DIAGNOSIS — D51.0 PERNICIOUS ANEMIA: ICD-10-CM

## 2019-03-26 DIAGNOSIS — L90.9 FAT PAD ATROPHY OF FOOT: ICD-10-CM

## 2019-03-26 PROCEDURE — 99499 UNLISTED E&M SERVICE: CPT | Mod: HCNC,S$GLB,, | Performed by: PODIATRIST

## 2019-03-26 PROCEDURE — 11721 PR DEBRIDEMENT OF NAILS, 6 OR MORE: ICD-10-PCS | Mod: Q9,HCNC,S$GLB, | Performed by: PODIATRIST

## 2019-03-26 PROCEDURE — 99999 PR PBB SHADOW E&M-EST. PATIENT-LVL III: ICD-10-PCS | Mod: PBBFAC,HCNC,, | Performed by: PODIATRIST

## 2019-03-26 PROCEDURE — 11721 DEBRIDE NAIL 6 OR MORE: CPT | Mod: Q9,HCNC,S$GLB, | Performed by: PODIATRIST

## 2019-03-26 PROCEDURE — 99499 NO LOS: ICD-10-PCS | Mod: HCNC,S$GLB,, | Performed by: PODIATRIST

## 2019-03-26 PROCEDURE — 99999 PR PBB SHADOW E&M-EST. PATIENT-LVL III: CPT | Mod: PBBFAC,HCNC,, | Performed by: PODIATRIST

## 2019-03-26 NOTE — PATIENT INSTRUCTIONS
Recommend lotions: eucerin, eucerin for diabetics, aquaphor, A&D ointment, gold bond for diabetics, sween, Quogue's Bees all purpose baby ointment,  urea 40 with aloe (found on amazon.com)    Shoe recommendations: (try 6pm.com, zappos.com , nordstromrack.SmartShoot, or shoes.SmartShoot for discounted prices) you can visit DSW shoes in [place]  or [employer] [facility] in the [facility] (there are also several shoe brand outlets in the [facility])    Asics (GT 2000 or gel foundations), new balance stability type shoes, saucony (stabil c3),  Barnes (GTS or Beast or transcend), propet (tennis shoe)    Sofmisti Mayfield (women) Noah&Og (men), clarks, crocs, aerosoles, naturalizers, SAS, ecco, born, sara geiger, rockports (dress shoes)    Vionic, burkenstocks, fitflops, propet (sandals)  Nike comfort thong sandals, crocs, propet (house shoes)    Nail Home remedy:  Vicks Vapor rub to nails for easier manageability      Diabetes: Inspecting Your Feet  Diabetes increases your chances of developing foot problems. So inspect your feet every day. This helps you find small skin irritations before they become serious infections. If you have trouble seeing the bottoms of your feet, use a mirror or ask a family member or friend to help.     Pressure spots on the bottom of the foot are common areas where problems develop.   How to check your feet  Below are tips to help you look for foot problems. Try to check your feet at the same time each day, such as when you get out of bed in the morning:  · Check the top of each foot. The tops of toes, back of the heel, and outer edge of the foot can get a lot of rubbing from poor-fitting shoes.  · Check the bottom of each foot. Daily wear and tear often leads to problems at pressure spots.  · Check the toes and nails. Fungal infections often occur between toes. Toenail problems can also be a sign of fungal infections or lead to breaks in the skin.  · Check your shoes, too. Loose objects inside a shoe can injure the  foot. Use your hand to feel inside your shoes for things like mariano, loose stitching, or rough areas that could irritate your skin.  Warning signs  Look for any color changes in the foot. Redness with streaks can signal a severe infection, which needs immediate medical attention. Tell your doctor right away if you have any of these problems:  · Swelling, sometimes with color changes, may be a sign of poor blood flow or infection. Symptoms include tenderness and an increase in the size of your foot.  · Warm or hot areas on your feet may be signs of infection. A foot that is cold may not be getting enough blood.  · Sensations such as burning, tingling, or pins and needles can be signs of a problem. Also check for areas that may be numb.  · Hot spots are caused by friction or pressure. Look for hot spots in areas that get a lot of rubbing. Hot spots can turn into blisters, calluses, or sores.  · Cracks and sores are caused by dry or irritated skin. They are a sign that the skin is breaking down, which can lead to infection.  · Toenail problems to watch for include nails growing into the skin (ingrown toenail) and causing redness or pain. Thick, yellow, or discolored nails can signal a fungal infection.  · Drainage and odor can develop from untreated sores and ulcers. Call your doctor right away if you notice white or yellow drainage, bleeding, or unpleasant odor.   © 2914-3530 [employer]. [address] [place] [address]. All rights reserved. This information is not intended as a substitute for professional medical care. Always follow your healthcare professional's instructions.        Step-by-Step:  Inspecting Your Feet (Diabetes)    Date Last Reviewed: 10/1/2016  © 4333-9535 [employer]. [address] [place] [address]. All rights reserved. This information is not intended as a substitute for professional medical care. Always follow your healthcare professional's  instructions.

## 2019-03-27 NOTE — PROGRESS NOTES
Subjective:      Patient ID: Marina Trimble is a 83 y.o. female.    Chief Complaint: Diabetes Mellitus (pcp Tony / Paty Shepherd PA-C 1-23-19); Diabetic Foot Exam; and Nail Care    Marina is a 83 y.o. female who presents to the clinic for evaluation and treatment of high risk feet. 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 The patient's chief complaint is long, thick toenails. This patient has documented high risk feet requiring routine maintenance secondary to diabetes mellitis and those secondary complications of diabetes, as mentioned. She has the one type of anemia; pernious 281.0 that still qualifies for nail care.    PCP: Jose Jimenez MD    Date Last Seen by PCP:   Chief Complaint   Patient presents with    Diabetes Mellitus     pcp Tony / Paty Shepherd PA-C 1-23-19    Diabetic Foot Exam    Nail Care       Current shoe gear:  SAS shoes, worn    Hemoglobin A1C   Date Value Ref Range Status   01/23/2019 9.4 (H) 4.0 - 5.6 % Final     Comment:     ADA Screening  "Guidelines:  5.7-6.4%  Consistent with prediabetes  >or=6.5%  Consistent with diabetes  High levels of fetal hemoglobin interfere with the HbA1C  assay. Heterozygous hemoglobin variants (HbS, HgC, etc)do  not significantly interfere with this assay.   However, presence of multiple variants may affect accuracy.     09/07/2018 7.0 (H) 4.0 - 5.6 % Final     Comment:     ADA Screening Guidelines:  5.7-6.4%  Consistent with prediabetes  >or=6.5%  Consistent with diabetes  High levels of fetal hemoglobin interfere with the HbA1C  assay. Heterozygous hemoglobin variants (HbS, HgC, etc)do  not significantly interfere with this assay.   However, presence of multiple variants may affect accuracy.     06/12/2018 8.0 (H) 4.0 - 5.6 % Final     Comment:     ADA Screening Guidelines:  5.7-6.4%  Consistent with prediabetes  >or=6.5%  Consistent with diabetes  High levels of fetal hemoglobin interfere with the HbA1C  assay. Heterozygous hemoglobin variants (HbS, HgC, etc)do  not significantly interfere with this assay.   However, presence of multiple variants may affect accuracy.       Current Outpatient Medications on File Prior to Visit   Medication Sig Dispense Refill    ACCU-CHEK FASTCLIX LANCET DRUM Misc USE TO CHECK BLOOD GLUCOSE THREE TIMES DAILY 30 each 11    ACCU-CHEK SMARTVIEW TEST STRIP Strp CHECK BLOOD GLUCOSE THREE TIMES DAILY 300 strip 11    acetaminophen (TYLENOL) 500 MG tablet Take 1 tablet (500 mg total) by mouth every 4 (four) hours as needed for Pain.  0    aspirin (ECOTRIN) 81 MG EC tablet Take 81 mg by mouth once daily.      atorvastatin (LIPITOR) 40 MG tablet TAKE 1 TABLET EVERY DAY 90 tablet 3    BD ULTRA-FINE BEVERLY PEN NEEDLE 32 gauge x 5/32" Ndle USE TWICE DAILY 500 each 12    blood-glucose meter Misc Use ad directed (Accu Check [place] smartview meter) 1 each 4    clopidogrel (PLAVIX) 75 mg tablet TAKE 1 TABLET EVERY DAY 90 tablet 3    cyanocobalamin 1,000 mcg/mL injection Inject 1 mL (1,000 mcg total) " "into the muscle every 21 days. 6 mL 11    dicyclomine (BENTYL) 10 MG capsule   2    flash glucose scanning reader (FREESTYLE KHOI 10 DAY READER) Misc 1 Device by Misc.(Non-Drug; Combo Route) route 3 (three) times daily. Disp 1 sensor for every 10 days 3 each 12    insulin aspart U-100 (NOVOLOG FLEXPEN U-100 INSULIN) 100 unit/mL InPn pen Inject 4/6/6 units into skin with correction scale 150-200 +1, 201-250 +2, 251-300 +3, 301-350 +4, >350 +5. 6 mL 4    LEVEMIR FLEXTOUCH U-100 INSULN 100 unit/mL (3 mL) InPn pen INJECT  35 UNITS SUBCUTANEOUSLY EVERY EVENING 30 mL 4    losartan (COZAAR) 100 MG tablet TAKE 1 TABLET EVERY DAY 90 tablet 90    multivitamin (CHEWABLE MULTI VITAMIN) Chew Take 2 tablets by mouth once daily.       naproxen (NAPROSYN) 500 MG tablet Take 1 tablet (500 mg total) by mouth 2 (two) times daily with meals. 11 tablet 0    NOVOFINE 32 32 gauge x 1/4" Ndle USE WITH LEVEMIR PEN EVERY EVENING 5000 each 12    omeprazole (PRILOSEC) 40 MG capsule TAKE 1 CAPSULE EVERY MORNING 90 capsule 90    prochlorperazine (COMPAZINE) 10 MG tablet Take 1 tablet (10 mg total) by mouth every 6 (six) hours as needed. 30 tablet 3    TRUEPLUS LANCETS 28 gauge Misc USE  TO CHECK BLOOD SUGAR THREE TIMES DAILY 300 each 0    vitamin D 1000 units Tab Take 1 tablet (1,000 Units total) by mouth once daily.      cycloSPORINE (RESTASIS) 0.05 % ophthalmic emulsion Place 0.4 mLs (1 drop total) into both eyes 2 (two) times daily. 180 vial 12    nitroGLYCERIN (NITROSTAT) 0.4 MG SL tablet Place 1 tablet (0.4 mg total) under the tongue every 5 (five) minutes as needed for Chest pain. 30 tablet 12     No current facility-administered medications on file prior to visit.      Review of patient's allergies indicates:   Allergen Reactions    Baclofen Nausea And Vomiting and Other (See Comments)     Dizziness    Benazepril      Other reaction(s): Rash,Swelling    Carisoprodol      Other reaction(s): Dysphoria    Desloratadine     "  Other reaction(s): Nausea  Other reaction(s): Nausea    Hydrocodone      Other reaction(s): disorientation    Methocarbamol      Other reaction(s): Nausea    Omeprazole Nausea And Vomiting    Oxybutynin      Other reaction(s): Hallucinations    Oxycodone Nausea And Vomiting    Tramadol      Other reaction(s): dizziness    Benazepril-hydrochlorothiazide Rash     Other reaction(s): Swelling    Metformin Diarrhea    Prednisone Rash     Past Surgical History:   Procedure Laterality Date    APPENDECTOMY      BLOCK-FACET-LUMBAR Bilateral 4/7/2016    [facility]    BLOCK-NERVE-MEDIAL BRANCH-LUMBAR Bilateral 10/21/2015    Performed by Rhiannno Kirkpatrick MD at [facility]    BLOCK-NERVE-MEDIAL BRANCH-LUMBAR Bilateral 10/20/2015    Performed by Rhiannon Kirkpatrick [facility]    BLOCK-NERVE-MEDIAL BRANCH-LUMBAR Bilateral 7/13/2015    [facility] by [facility]    CATARACT EXTRACTION W/  INTRAOCULAR LENS IMPLANT Left 10/16/14    OS ()    CHOLECYSTECTOMY      COLECTOMY      COLONOSCOPY      COLONOSCOPY N/A 2/7/2013    [facility] (4[facility] F[facility])    ESOPHAGOGASTRODUODENOSCOPY (EGD) N/A 10/10/2017    Performed by Terence Mccloud [facility] at [facility] (4TH FLR)    ESOPHAGOGASTRODUODENOSCOPY (EGD) N/A 7/11/2017    [facility] at [facility] (2ND FLR)    EYE SURGERY Bilateral     cataracts    eyelid surgery      HYSTERECTOMY      INJECTION-JOINT Bilateral 9/16/2015    [facility] by [facility]    INSERTION-INTRAOCULAR LENS (IOL) Left 10/16/2014    Performed by [facility]z [facility]ginnis [facility] at [facility] OR 1ST FLR    OOPHORECTOMY      PARTIAL GLOSSECTOMY  2010    PHACOEMULSIFICATION-ASPIRATION-CATARACT Left 10/16/2014    Performed by [facility] at [facility] FLR    RADIOFREQUENCY THERMOCOAGULATION (RFTC)-NERVE-MEDIAN BRANCH-LUMBAR Right 2/18/2016     [facility]    RADIOFREQUENCY THERMOCOAGULATION (RFTC)-NERVE-MEDIAN BRANCH-LUMBAR Left 2/4/2016    [facility] at [facility]    RADIOFREQUENCY THERMOCOAGULATION (RFTC)-NERVE-MEDIAN BRANCH-LUMBAR Bilateral 7/14/2015    Per[facility] at [facility]    SELECTIVE NECK DISSECTION  2010    tonsilectomy      TONSILLECTOMY       Family History   Problem Relation Age of Onset    Cataracts Mother     Macular degeneration Mother     Blindness Mother     No Known Problems Father     No Known Problems Son     No Known Problems Son     Coronary artery disease Unknown     COPD Unknown     No Known Problems Sister     No Known Problems Brother     No Known Problems Maternal Aunt     No Known Problems Maternal Uncle     No Known Problems Paternal Aunt     No Known Problems Paternal Uncle     No Known Problems Maternal Grandmother     No Known Problems Maternal Grandfather     No Known Problems Paternal Grandmother     No Known Problems Paternal Grandfather     Skin cancer Neg Hx     Melanoma Neg Hx     Amblyopia Neg Hx     Cancer Neg Hx     Diabetes Neg Hx     Glaucoma Neg Hx     Hypertension Neg Hx     Retinal detachment Neg Hx     Strabismus Neg Hx     Stroke Neg Hx     Thyroid disease Neg Hx     Colon cancer Neg Hx     Esophageal cancer Neg Hx      Social History     Socioeconomic History    Marital status:      Spouse name: Not on file    Number of children: Not on file    Years of education: Not on file    Highest education level: Not on file   Occupational History    Not on file   Social Needs    Financial resource strain: Not on file    Food insecurity:     Worry: Not on file     Inability: Not on file    Transportation needs:     Medical: Not on file     Non-medical: Not on file   Tobacco Use    Smoking status: Never Smoker    Smokeless tobacco: Never Used   Substance and Sexual Activity     "Alcohol use: No     Alcohol/week: 0.0 oz    Drug use: No    Sexual activity: Never   Lifestyle    Physical activity:     Days per week: Not on file     Minutes per session: Not on file    Stress: Not on file   Relationships    Social connections:     Talks on phone: Not on file     Gets together: Not on file     Attends [employer] service: Not on file     Active member of club or organization: Not on file     Attends meetings of clubs or organizations: Not on file     Relationship status: Not on file    Intimate partner violence:     Fear of current or ex partner: Not on file     Emotionally abused: Not on file     Physically abused: Not on file     Forced sexual activity: Not on file   Other Topics Concern    Are you pregnant or think you may be? No    Breast-feeding No   Social History Narrative    She is . She lives on the [facility].     Review of Systems   Constitution: Negative for chills and fever.   Cardiovascular: Positive for leg swelling. Negative for claudication.   Respiratory: Positive for shortness of breath. Negative for cough.    Skin: Positive for dry skin and nail changes. Negative for itching and rash.   Musculoskeletal: Positive for arthritis, back pain, falls (december 2016, broke her hip), gout and joint pain. Negative for joint swelling and muscle weakness.   Gastrointestinal: Negative for diarrhea, nausea and vomiting.   Neurological: Positive for paresthesias. Negative for numbness, tremors and weakness.   Psychiatric/Behavioral: Negative for altered mental status and hallucinations.         Objective:       Vitals:    03/26/19 1115   BP: 136/74   Weight: 59.6 kg (131 lb 6.3 oz)   Height: 4' 11" (1.499 m)   PainSc: 0-No pain       Physical Exam   Constitutional:   General: Pt. is well-developed, well-nourished, appears stated age, in no acute distress, alert and oriented x 3. No evidence of depression, anxiety, or agitation. Calm, cooperative, and communicative. Appropriate " interactions and affect.       Cardiovascular:   Pulses:       Dorsalis pedis pulses are 1+ on the right side, and 1+ on the left side.        Posterior tibial pulses are 1+ on the right side, and 1+ on the left side.   Dorsalis pedis and posterior tibial pulses are diminished Bilaterally. Toes are cool to touch. Feet are warm proximally.There is decreased digital hair. Skin is atrophic   Musculoskeletal:        Right ankle: She exhibits swelling. She exhibits normal range of motion. No tenderness. Achilles tendon exhibits no pain and no defect.        Left ankle: She exhibits swelling. She exhibits normal range of motion. No tenderness. Achilles tendon exhibits no pain and no defect.        Right foot: There is normal range of motion and no tenderness.        Left foot: There is normal range of motion and no tenderness.   Decreased stride, station of gait.  apropulsive toe off.  Increased angle and base of gait.    Patient has hammertoes of digits 2-5 bilateral partially reducible without symptom today.    Visible and palpable bunion without pain at dorsomedial 1st metatarsal head right and left.  Hallux abducted right and left partially reducible, tracks laterally without being track bound.  No ecchymosis, erythema, edema, or cardinal signs infection or signs of trauma same foot.    Fat pad atrophy to heels and met heads bilateral       Neurological: No sensory deficit.   [place] 5.07 monofilament is intact bilateral feet. Sharp/dull sensation is also intact Bilateral feet.       Skin: Skin is warm, dry and intact. No abrasion, no ecchymosis, no lesion and no rash noted. She is not diaphoretic. No cyanosis or erythema. No pallor. Nails show no clubbing.   Skin is atrophic, mild plantar rubor.  No pallor.      No pedal hair noted    Toenails 1-5 bilaterally are elongated by 3-5 mm, thickened by 2-3 mm, discolored/yellowed, dystrophic, brittle with subungual debris.     granuloma noted to lateral right  hallux nail border;  Tender to palpation     Interdigital Spaces clean, dry and without evidence of break in skin integrity   Psychiatric: She has a normal mood and affect. Her speech is normal.   Nursing note and vitals reviewed.        Assessment:       Encounter Diagnoses   Name Primary?    Type II diabetes mellitus with neurological manifestations Yes    Type II diabetes mellitus with peripheral circulatory disorder     Pernicious anemia     Fat pad atrophy of foot     Nail dermatophytosis          Plan:       Marina was seen today for diabetes mellitus, diabetic foot exam and nail care.    Diagnoses and all orders for this visit:    Type II diabetes mellitus with neurological manifestations    Type II diabetes mellitus with peripheral circulatory disorder    Pernicious anemia    Fat pad atrophy of foot    Nail dermatophytosis      I counseled the patient on her conditions, their implications and medical management.    - Shoe inspection. Diabetic Foot Education. Patient reminded of the importance of good nutrition and blood sugar control to help prevent podiatric complications of diabetes. Patient instructed on proper foot hygeine. We discussed wearing proper shoe gear, daily foot inspections, never walking without protective shoe gear, never putting sharp instruments to feet.     - With patient's permission, nails were aggressively reduced and debrided x 10 to their soft tissue attachment mechanically and with electric , removing all offending nail and debris. Patient relates relief following the procedure.  She will continue to monitor the areas daily, inspect her feet, wear protective shoe gear when ambulatory, moisturizer to maintain skin integrity and follow in this office in approximately 2-4 months, sooner p.r.n.

## 2019-04-02 DIAGNOSIS — E78.2 COMBINED HYPERLIPIDEMIA ASSOCIATED WITH TYPE 2 DIABETES MELLITUS: ICD-10-CM

## 2019-04-02 DIAGNOSIS — E11.69 COMBINED HYPERLIPIDEMIA ASSOCIATED WITH TYPE 2 DIABETES MELLITUS: ICD-10-CM

## 2019-04-02 RX ORDER — ATORVASTATIN CALCIUM 40 MG/1
TABLET, FILM COATED ORAL
Qty: 90 TABLET | Refills: 3 | Status: SHIPPED | OUTPATIENT
Start: 2019-04-02

## 2019-04-05 ENCOUNTER — OFFICE VISIT (OUTPATIENT)
Dept: FAMILY MEDICINE | Facility: CLINIC | Age: 84
End: 2019-04-05
Payer: MEDICARE

## 2019-04-05 VITALS
DIASTOLIC BLOOD PRESSURE: 70 MMHG | OXYGEN SATURATION: 96 % | HEIGHT: 59 IN | TEMPERATURE: 99 F | BODY MASS INDEX: 26.27 KG/M2 | SYSTOLIC BLOOD PRESSURE: 122 MMHG | WEIGHT: 130.31 LBS | HEART RATE: 98 BPM

## 2019-04-05 DIAGNOSIS — Z00.00 ROUTINE MEDICAL EXAM: Primary | ICD-10-CM

## 2019-04-05 DIAGNOSIS — G60.9 HEREDITARY AND IDIOPATHIC PERIPHERAL NEUROPATHY: ICD-10-CM

## 2019-04-05 DIAGNOSIS — M48.061 SPINAL STENOSIS OF LUMBAR REGION, UNSPECIFIED WHETHER NEUROGENIC CLAUDICATION PRESENT: ICD-10-CM

## 2019-04-05 DIAGNOSIS — E78.2 COMBINED HYPERLIPIDEMIA ASSOCIATED WITH TYPE 2 DIABETES MELLITUS: ICD-10-CM

## 2019-04-05 DIAGNOSIS — M47.816 LUMBAR FACET ARTHROPATHY: ICD-10-CM

## 2019-04-05 DIAGNOSIS — I69.354 HEMIPARESIS AFFECTING LEFT SIDE AS LATE EFFECT OF STROKE: ICD-10-CM

## 2019-04-05 DIAGNOSIS — M46.1 SACROILIITIS: ICD-10-CM

## 2019-04-05 DIAGNOSIS — D03.39: ICD-10-CM

## 2019-04-05 DIAGNOSIS — E78.5 HYPERLIPIDEMIA, UNSPECIFIED HYPERLIPIDEMIA TYPE: ICD-10-CM

## 2019-04-05 DIAGNOSIS — I70.0 ATHEROSCLEROSIS OF AORTA: ICD-10-CM

## 2019-04-05 DIAGNOSIS — C82.96 FOLLICULAR LYMPHOMA OF INTRAPELVIC LYMPH NODES, UNSPECIFIED FOLLICULAR LYMPHOMA TYPE: ICD-10-CM

## 2019-04-05 DIAGNOSIS — E11.69 COMBINED HYPERLIPIDEMIA ASSOCIATED WITH TYPE 2 DIABETES MELLITUS: ICD-10-CM

## 2019-04-05 DIAGNOSIS — I10 ESSENTIAL HYPERTENSION: ICD-10-CM

## 2019-04-05 DIAGNOSIS — R41.3 MEMORY CHANGES: ICD-10-CM

## 2019-04-05 DIAGNOSIS — E11.42 DIABETIC POLYNEUROPATHY ASSOCIATED WITH TYPE 2 DIABETES MELLITUS: ICD-10-CM

## 2019-04-05 DIAGNOSIS — Z79.4 LONG-TERM INSULIN USE: ICD-10-CM

## 2019-04-05 PROCEDURE — 99999 PR PBB SHADOW E&M-EST. PATIENT-LVL III: ICD-10-PCS | Mod: PBBFAC,HCNC,, | Performed by: INTERNAL MEDICINE

## 2019-04-05 PROCEDURE — 3078F DIAST BP <80 MM HG: CPT | Mod: HCNC,CPTII,S$GLB, | Performed by: INTERNAL MEDICINE

## 2019-04-05 PROCEDURE — 99999 PR PBB SHADOW E&M-EST. PATIENT-LVL III: CPT | Mod: PBBFAC,HCNC,, | Performed by: INTERNAL MEDICINE

## 2019-04-05 PROCEDURE — 3078F PR MOST RECENT DIASTOLIC BLOOD PRESSURE < 80 MM HG: ICD-10-PCS | Mod: HCNC,CPTII,S$GLB, | Performed by: INTERNAL MEDICINE

## 2019-04-05 PROCEDURE — 1101F PT FALLS ASSESS-DOCD LE1/YR: CPT | Mod: HCNC,CPTII,S$GLB, | Performed by: INTERNAL MEDICINE

## 2019-04-05 PROCEDURE — 99499 RISK ADDL DX/OHS AUDIT: ICD-10-PCS | Mod: S$GLB,,, | Performed by: INTERNAL MEDICINE

## 2019-04-05 PROCEDURE — 99397 PER PM REEVAL EST PAT 65+ YR: CPT | Mod: 25,HCNC,S$GLB, | Performed by: INTERNAL MEDICINE

## 2019-04-05 PROCEDURE — 99397 PR PREVENTIVE VISIT,EST,65 & OVER: ICD-10-PCS | Mod: 25,HCNC,S$GLB, | Performed by: INTERNAL MEDICINE

## 2019-04-05 PROCEDURE — 1101F PR PT FALLS ASSESS DOC 0-1 FALLS W/OUT INJ PAST YR: ICD-10-PCS | Mod: HCNC,CPTII,S$GLB, | Performed by: INTERNAL MEDICINE

## 2019-04-05 PROCEDURE — 99214 OFFICE O/P EST MOD 30 MIN: CPT | Mod: 25,HCNC,S$GLB, | Performed by: INTERNAL MEDICINE

## 2019-04-05 PROCEDURE — 3074F PR MOST RECENT SYSTOLIC BLOOD PRESSURE < 130 MM HG: ICD-10-PCS | Mod: HCNC,CPTII,S$GLB, | Performed by: INTERNAL MEDICINE

## 2019-04-05 PROCEDURE — 3074F SYST BP LT 130 MM HG: CPT | Mod: HCNC,CPTII,S$GLB, | Performed by: INTERNAL MEDICINE

## 2019-04-05 PROCEDURE — 99499 UNLISTED E&M SERVICE: CPT | Mod: S$GLB,,, | Performed by: INTERNAL MEDICINE

## 2019-04-05 PROCEDURE — 99214 PR OFFICE/OUTPT VISIT, EST, LEVL IV, 30-39 MIN: ICD-10-PCS | Mod: 25,HCNC,S$GLB, | Performed by: INTERNAL MEDICINE

## 2019-04-05 RX ORDER — ESCITALOPRAM OXALATE 5 MG/1
5 TABLET ORAL DAILY
Qty: 30 TABLET | Refills: 11 | Status: SHIPPED | OUTPATIENT
Start: 2019-04-05 | End: 2020-04-04

## 2019-04-05 NOTE — PROGRESS NOTES
Chief complaint:  physical    83-year-old white female with multiple medical problems .  She is active is much as she can be.  She is due for blood work.  She no longer needs any breast or colon cancer screening based upon her age         ROS:   CONST: weight stable. EYES: no vision change. ENT: no sore throat. CV: no chest pain w/ exertion. RESP: no shortness of breath. GI: no nausea, vomiting, diarrhea. No dysphagia. : Some recent urinary frequency and occasional dysuria. MUSCULOSKELETAL: no new myalgias or arthralgias other than a week and a half of increase lower lumbar pain. SKIN: no new changes. NEURO: no focal deficits. PSYCH: no new issues. ENDOCRINE: no polyuria. HEME: no lymph nodes. ALLERGY: no general pruritis.      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 Shot monthly.  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  13) iron deficiency anemia. 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  Vertebral compression fracture treated with vertebral plasty   19.  Prevnar 13 2016  20.  Pelvic fracture December 2016  21.  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  Eventual total Hyst    Allergies: Prednisone, hydrocodone, Lotensin, oxybutynin     Social history: The patient is a nonsmoker who is retired. The patient is . She rarely drank alcohol.     Family history: There is a significant family history of cardiac, pulmonary, and endocrine disease. There is no family history of head and neck malignancy.        Vital signs as above, Gen: no distress  EYES: conjunctiva clear, non-icteric, PERRL  ENT: nose clear, nasal mucosa normal, oropharynx clear and moist, teeth good  NECK:supple, thyroid non-palpable  RESP: effort is good, lungs clear  CV: heart RRR w/o murmur, gallops or rubs; no carotid bruits, no edema  GI: abdomen soft, non-distended, non-tender, no hepatosplenomegaly  MS: Kyphotic with walker, no clubbing or cyanosis of the digits, obvious scoliosis of the lumbar spine.    SKIN: no rashes, warm to touch        Diagnoses and all orders for this visit:    Routine medical exam, update blood work, no further colonoscopies or mammograms indicated                                      Additional evaluation and management issues:     Additionally, patient has numerous other medical issues to address separate from her physical.  She has diabetes is generally chronically uncontrolled.  She was taking Levemir 25 units and it would be too much and she would have morning sugars in the 60s.  She would then hold the medication for a week and over week and gradually go back up into the 200 range.  A1c 3 mo ago was 9.4.  The  has taken over management of medications due to her memory problems.  Currently giving her 25 units of Levemir not using the NovoLog as it is not needed.  Her sugars are running over 210.  We discussed and I gave him written instructions about titrating up to 30 units of Levemir and then going up by 2-4 units every week as needed.  She is not on metformin but also has baseline loose bowels and so likely she the tried or we declined  to try metformin in the past due to this bowel problem.  She has had bowel incontinence so we will avoid metformin.  I would be fearful of giving her Amaryl and other such medications.  It looks like years ago she was on Januvia and did well with that but likely discontinued due to the cost.  Hopefully we can control diabetes with her insulin    Continues with memory problems that she now admits to.  We discussed possible underlying dementia.  We also discussed possible underlying pseudodementia.   says her main problem she becomes very short fuse than agitated and irritable at times.  We discussed underlying anxiety and depression.  Her symptoms may respond to such medication is Lexapro and we will start with a low dose of 5 mg.  We discussed expectations and dose adjustments.  Thereafter we potentially discussed adding Aricept and Namenda and so forth.    She has had some increase in her lumbar pain .  She does not feel lump over the lumbar spine that was not there before she believes.  She does have a history of compression fractures but no recent falls.  She has had vertebral plasty in the past       She has hypertension which appears to be indicated control.      She has anemia which is stable.  She has duodenal stenosis and we reviewed the EGD and she has no trouble swallowing or vomiting.      She has history of stroke and hyperlipidemia.  She is not fasting but we will reassessed LDL 11/17.        All the separate issues reviewed and patient counseled and her evaluation and management will be based upon time counseling Total time over 25 minutes with over 50% counseling.        Assessment and plan:          Diabetes mellitus with neurological manifestations, uncontrolled, will accept some degree of being uncontrolled but try to get her A1c below 8.  We will titrate up on insulin    Memory changes, dementia versus and/or pseudo dementia, start with Lexapro.   does have a lot of questions  regarding getting some assistance which we discussed may not be home health but could well be from a hired sitter and so forth and he may well need a list of reference sitters and may be other resources in the community that they could benefit from, Meals on wheels, there insurance company may offer meals and so forth.  -     Ambulatory referral to Outpatient Case Management    Essential hypertension, chronic and stable    Hyperlipidemia, unspecified hyperlipidemia type    Hemiparesis affecting left side as late effect of stroke, no new neurological deficits  -     Ambulatory referral to Outpatient Case Management    Combined hyperlipidemia associated with type 2 diabetes mellitus    Atherosclerosis of aorta    Spinal stenosis of lumbar region, unspecified whether neurogenic claudication present  -     Ambulatory referral to Outpatient Case Management    Diabetic polyneuropathy associated with type 2 diabetes mellitus    Lumbar facet arthropathy    Follicular lymphoma of intrapelvic lymph nodes, unspecified follicular lymphoma type    Hereditary and idiopathic peripheral neuropathy    Long-term insulin use  -     Ambulatory referral to Outpatient Case Management    Sacroiliitis, noted in the records    Lentigo maligna in situ of nose, 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

## 2019-04-08 ENCOUNTER — OUTPATIENT CASE MANAGEMENT (OUTPATIENT)
Dept: ADMINISTRATIVE | Facility: OTHER | Age: 84
End: 2019-04-08

## 2019-04-08 ENCOUNTER — TELEPHONE (OUTPATIENT)
Dept: FAMILY MEDICINE | Facility: CLINIC | Age: 84
End: 2019-04-08

## 2019-04-08 NOTE — PROGRESS NOTES
This LMSW received a referral on the above patient.   Reason for referral:Memory changes  Name of the community resource that was provided: [employer] Care[employer]ivers, [facility], Senior Resource Guide   Resource given to: Patient Spouse/Caregiver  via [place] Mail and Telephone    This LMSW completed assessment with patient caregiver. Caregiver reports he is hard of hearing, but is willing to complete assessment.  Caregiver reports patient is independent with her ADL's, however needs supervision while completing task. Caregiver reports no one assists him with patient. Caregiver reports son sometimes assists with picking up groceries. Caregiver reports NH Placement will not be an option. LMSW ask caregiver if the family can afford a hired caregiver. Caregiver reports yes. LMSW provided caregiver with hired caregiver resources. LMSW also referred patient to [facility] to assist with respite and personal care services .   Caregiver reports patient ambulates with a walker. Caregiver denied patient needs assistance with food, shelter, medication or medical. Caregiver reports he provides transportation to and from appointments. Caregiver reports he is patient MPOA. LMSW mailed all resources and provided her contact information for any additional needs.

## 2019-04-08 NOTE — TELEPHONE ENCOUNTER
----- Message from Arleen Temple LPN sent at 4/8/2019  3:26 PM CDT -----      ----- Message -----  From: Lottie Mack LMSW  Sent: 4/8/2019   2:26 PM  To: Tony LEONARD Staff    This SW received a referral on the above patient. This SW provided patient/caregiver with the following resource: [employer], [employer], Senior Resource Guide . The resource can be located within [facility] under the Care category.    Thank you for the referral,    Lottie Mack LMSW

## 2019-04-23 ENCOUNTER — OFFICE VISIT (OUTPATIENT)
Dept: FAMILY MEDICINE | Facility: CLINIC | Age: 84
End: 2019-04-23
Payer: MEDICARE

## 2019-04-23 VITALS
BODY MASS INDEX: 26.31 KG/M2 | HEIGHT: 59 IN | HEART RATE: 73 BPM | SYSTOLIC BLOOD PRESSURE: 132 MMHG | WEIGHT: 130.5 LBS | OXYGEN SATURATION: 94 % | TEMPERATURE: 98 F | DIASTOLIC BLOOD PRESSURE: 70 MMHG

## 2019-04-23 DIAGNOSIS — H10.33 ACUTE CONJUNCTIVITIS OF BOTH EYES, UNSPECIFIED ACUTE CONJUNCTIVITIS TYPE: Primary | ICD-10-CM

## 2019-04-23 PROCEDURE — 99214 OFFICE O/P EST MOD 30 MIN: CPT | Mod: HCNC,S$GLB,, | Performed by: NURSE PRACTITIONER

## 2019-04-23 PROCEDURE — 3078F PR MOST RECENT DIASTOLIC BLOOD PRESSURE < 80 MM HG: ICD-10-PCS | Mod: HCNC,CPTII,S$GLB, | Performed by: NURSE PRACTITIONER

## 2019-04-23 PROCEDURE — 3075F PR MOST RECENT SYSTOLIC BLOOD PRESS GE 130-139MM HG: ICD-10-PCS | Mod: HCNC,CPTII,S$GLB, | Performed by: NURSE PRACTITIONER

## 2019-04-23 PROCEDURE — 3078F DIAST BP <80 MM HG: CPT | Mod: HCNC,CPTII,S$GLB, | Performed by: NURSE PRACTITIONER

## 2019-04-23 PROCEDURE — 99999 PR PBB SHADOW E&M-EST. PATIENT-LVL V: ICD-10-PCS | Mod: PBBFAC,HCNC,, | Performed by: NURSE PRACTITIONER

## 2019-04-23 PROCEDURE — 99999 PR PBB SHADOW E&M-EST. PATIENT-LVL V: CPT | Mod: PBBFAC,HCNC,, | Performed by: NURSE PRACTITIONER

## 2019-04-23 PROCEDURE — 99214 PR OFFICE/OUTPT VISIT, EST, LEVL IV, 30-39 MIN: ICD-10-PCS | Mod: HCNC,S$GLB,, | Performed by: NURSE PRACTITIONER

## 2019-04-23 PROCEDURE — 1101F PT FALLS ASSESS-DOCD LE1/YR: CPT | Mod: HCNC,CPTII,S$GLB, | Performed by: NURSE PRACTITIONER

## 2019-04-23 PROCEDURE — 1101F PR PT FALLS ASSESS DOC 0-1 FALLS W/OUT INJ PAST YR: ICD-10-PCS | Mod: HCNC,CPTII,S$GLB, | Performed by: NURSE PRACTITIONER

## 2019-04-23 PROCEDURE — 3075F SYST BP GE 130 - 139MM HG: CPT | Mod: HCNC,CPTII,S$GLB, | Performed by: NURSE PRACTITIONER

## 2019-04-23 RX ORDER — MOXIFLOXACIN 5 MG/ML
1 SOLUTION/ DROPS OPHTHALMIC 3 TIMES DAILY
Qty: 3 ML | Refills: 1 | Status: SHIPPED | OUTPATIENT
Start: 2019-04-23

## 2019-04-23 RX ORDER — MOXIFLOXACIN 5 MG/ML
1 SOLUTION/ DROPS OPHTHALMIC 3 TIMES DAILY
Qty: 3 ML | Refills: 1 | Status: SHIPPED | OUTPATIENT
Start: 2019-04-23 | End: 2019-04-23 | Stop reason: SDUPTHER

## 2019-04-23 NOTE — PATIENT INSTRUCTIONS
Conjunctivitis, Bacterial    You have an infection in the membranes covering the white part of the eye. This part of the eye is called the conjunctiva. The infection is called conjunctivitis. The most common symptoms of conjunctivitis include a thick, pus-like discharge from the eye, swollen eyelids, redness, eyelids sticking together upon awakening, and a gritty or scratchy feeling in the eye. Your infection was caused by bacteria. It may be treated with medicine. With treatment, the infection takes about 7 to 10 days to resolve.  Home care  · Use prescribed antibiotic eye drops or ointment as directed to treat the infection.  · Apply a warm compress (towel soaked in warm water) to the affected eye 3 to 4 times a day. Do this just before applying medicine to the eye.  · Use a warm, wet cloth to wipe away crusting of the eyelids in the morning. This is caused by mucus drainage during the night. You may also use saline irrigating solution or artificial tears to rinse away mucus in the eye. Do not put a patch over the eye.  · Wash your hands before and after touching the infected eye. This is to prevent spreading the infection to the other eye, and to other people. Do not share your towels or washcloths with others.  · You may use acetaminophen or ibuprofen to control pain, unless another medicine was prescribed. (Note: If you have chronic liver or kidney disease or have ever had a stomach ulcer or gastrointestinal bleeding, talk with your doctor before using these medicines.)  · Do not wear contact lenses until your eyes have healed and all symptoms are gone.  Follow-up care  Follow up with your healthcare provider, or as advised.  When to seek medical advice  Call your healthcare provider right away if any of these occur:  · Worsening vision  · Increasing pain in the eye  · Increasing swelling or redness of the eyelid  · Redness spreading around the eye  Date Last Reviewed: 6/14/2015  © 5334-8864 [employer] [facility]. [address] [place] [address], [place] [address]. All rights reserved. This information is not intended as a substitute for professional medical care. Always follow your healthcare professional's instructions.

## 2019-04-23 NOTE — PROGRESS NOTES
Subjective:       Patient ID: Marina Trimble is a 83 y.o. female.    Chief Complaint: Eye Problem (redness x 2 days)    Eye Problem    Both eyes are affected.This is a new problem. The current episode started yesterday. The problem occurs constantly. The problem has been unchanged. There was no injury mechanism. The pain is at a severity of 4/10. The pain is moderate. There is no known exposure to pink eye. She does not wear contacts. Associated symptoms include eye redness and a foreign body sensation. Pertinent negatives include no blurred vision, eye discharge, double vision, fever, itching, nausea, photophobia, recent URI, vomiting or weakness. She has tried nothing for the symptoms.     Review of Systems   Constitutional: Negative for fever.   HENT: Negative for congestion, rhinorrhea, sinus pressure, sinus pain, sneezing and sore throat.    Eyes: Positive for pain and redness. Negative for blurred vision, double vision, photophobia, discharge, itching and visual disturbance.   Respiratory: Negative for apnea, cough, choking, chest tightness, shortness of breath, wheezing and stridor.    Gastrointestinal: Negative for nausea and vomiting.   Neurological: Negative for dizziness and weakness.   Hematological: Negative for adenopathy.       Objective:      Physical Exam   Constitutional: She is oriented to person, place, and time. Vital signs are normal. She appears well-developed and well-nourished.   HENT:   Head: Normocephalic and atraumatic.   Right Ear: External ear normal.   Left Ear: External ear normal.   Nose: Nose normal.   Mouth/Throat: Oropharynx is clear and moist. No oropharyngeal exudate.   Eyes: Pupils are equal, round, and reactive to light. EOM and lids are normal. Lids are everted and swept, no foreign bodies found. Right eye exhibits no chemosis, no discharge, no exudate and no hordeolum. No foreign body present in the right eye. Left eye exhibits discharge. Right conjunctiva is not  injected. Left conjunctiva is injected. Left conjunctiva has a hemorrhage.   Cardiovascular: Normal rate, regular rhythm and normal heart sounds.   Pulmonary/Chest: Effort normal and breath sounds normal.   Neurological: She is alert and oriented to person, place, and time.   Skin: Skin is warm, dry and intact.   Psychiatric: She has a normal mood and affect.       Assessment:       1. Acute conjunctivitis of both eyes, unspecified acute conjunctivitis type    2. Diabetes mellitus with neurological manifestations, uncontrolled        Plan:       Marina was seen today for eye problem.    Diagnoses and all orders for this visit:    Acute conjunctivitis of both eyes, unspecified acute conjunctivitis type  -     Discontinue: moxifloxacin (VIGAMOX) 0.5 % ophthalmic solution; Place 1 drop into the left eye 3 (three) times daily.  -     moxifloxacin (VIGAMOX) 0.5 % ophthalmic solution; Place 1 drop into the left eye 3 (three) times daily.  Home care  · Use prescribed antibiotic eye drops or ointment as directed to treat the infection.  · Apply a warm compress (towel soaked in warm water) to the affected eye 3 to 4 times a day. Do this just before applying medicine to the eye.  · Use a warm, wet cloth to wipe away crusting of the eyelids in the morning. This is caused by mucus drainage during the night. You may also use saline irrigating solution or artificial tears to rinse away mucus in the eye. Do not put a patch over the eye.  · Wash your hands before and after touching the infected eye. This is to prevent spreading the infection to the other eye, and to other people. Do not share your towels or washcloths with others.  · You may use acetaminophen or ibuprofen to control pain, unless another medicine was prescribed. (Note: If you have chronic liver or kidney disease or have ever had a stomach ulcer or gastrointestinal bleeding, talk with your doctor before using these medicines.)  · Do not wear contact lenses until  your eyes have healed and all symptoms are gone.  Follow-up care  Follow up with your healthcare provider, or as advised.  When to seek medical advice  Call your healthcare provider right away if any of these occur:  · Worsening vision  · Increasing pain in the eye  · Increasing swelling or redness of the eyelid  · Redness spreading around the eye  Date Last Reviewed: 6/14/2015  © 6376-9923 [employer] [address] [place] [address], [place] [address]. All rights reserved. This information is not intended as a substitute for professional medical care. Always follow your healthcare professional's instructions.      Diabetes mellitus with neurological manifestations, uncontrolled  The current medical regimen is effective;  continue present plan and medications.

## 2019-05-07 ENCOUNTER — TELEPHONE (OUTPATIENT)
Dept: FAMILY MEDICINE | Facility: CLINIC | Age: 84
End: 2019-05-07

## 2019-05-07 NOTE — TELEPHONE ENCOUNTER
Pt came into clinic with cousin and care taker:  Dr MITCHELL instructed pt to check blood sugars three times daily  Give 35 units of levemir every morning.   Keep 1 week record of sugar levels and call clinic; if adjustments need to be made Dr. MITCHELL will make changes then.   Former smoker

## 2019-05-07 NOTE — TELEPHONE ENCOUNTER
Pt cousin states pt  passed away and pt is running high blood sugars. They are wondering how pt is supposed to be taking her sugars/insulin. Please advise.

## 2019-05-07 NOTE — TELEPHONE ENCOUNTER
----- Message from More Hinson sent at 5/7/2019 11:56 AM CDT -----  Contact: Self   Type: Patient Call Back    Who called:Self     What is the request in detail: Asking to speak with the office in ref to how to take the insulin   Can the clinic reply by [facility]? Call     Would the patient rather a call back or a response via [facility]? Call   Best call back number: [phone]

## 2019-05-15 NOTE — TELEPHONE ENCOUNTER
Pt care giver called with the following blood sugar readings:  Date:  Breakfast Lunch     Dinner  5/8-  184  273     174  5/9-  159       102  5/10-  196       150  5/11-  199  201          199  5/12-  163  239     231  5/13-  169  256     255  5/14-  176  318     146  5/15-  165  227    Would like to know if you want to continue with current regimen

## 2019-05-15 NOTE — TELEPHONE ENCOUNTER
Thank them for sending the sugars, looks like excellent recordings    Generally looks like the sugars are still high so we will increase the once a day Levemir insulin from 35 units a day to 40 units a day and we will see what that does over a week period of time

## 2019-05-29 ENCOUNTER — TELEPHONE (OUTPATIENT)
Dept: PODIATRY | Facility: CLINIC | Age: 84
End: 2019-05-29

## 2019-05-29 NOTE — TELEPHONE ENCOUNTER
Left message to voicemail [phone] to return call to clinic re: scheduled 6-3-2019 needs to be rescheduled

## 2019-05-30 NOTE — TELEPHONE ENCOUNTER
Left message to voicemail [phone] to return call to clinic re: scheduled 6-3-2019 needs to be rescheduled

## 2019-05-31 NOTE — TELEPHONE ENCOUNTER
Left message to voicemail [phone] to return call to clinic re: scheduled 6-3-2019 needs to be rescheduled     Per recording, [phone], call cannot be completed as dialed

## 2019-07-25 RX ORDER — PROMETHAZINE HYDROCHLORIDE AND DEXTROMETHORPHAN HYDROBROMIDE 6.25; 15 MG/5ML; MG/5ML
SYRUP ORAL
Qty: 120 ML | Refills: 0 | OUTPATIENT
Start: 2019-07-25